# Patient Record
Sex: MALE | Race: ASIAN | Employment: OTHER | URBAN - METROPOLITAN AREA
[De-identification: names, ages, dates, MRNs, and addresses within clinical notes are randomized per-mention and may not be internally consistent; named-entity substitution may affect disease eponyms.]

---

## 2020-11-27 ENCOUNTER — APPOINTMENT (OUTPATIENT)
Dept: CT IMAGING | Facility: CLINIC | Age: 80
End: 2020-11-27
Attending: EMERGENCY MEDICINE
Payer: COMMERCIAL

## 2020-11-27 ENCOUNTER — APPOINTMENT (OUTPATIENT)
Dept: GENERAL RADIOLOGY | Facility: CLINIC | Age: 80
End: 2020-11-27
Attending: EMERGENCY MEDICINE
Payer: COMMERCIAL

## 2020-11-27 ENCOUNTER — HOSPITAL ENCOUNTER (EMERGENCY)
Facility: CLINIC | Age: 80
Discharge: HOME | End: 2020-11-27
Attending: EMERGENCY MEDICINE | Admitting: EMERGENCY MEDICINE
Payer: COMMERCIAL

## 2020-11-27 VITALS
BODY MASS INDEX: 30.51 KG/M2 | OXYGEN SATURATION: 94 % | TEMPERATURE: 99 F | SYSTOLIC BLOOD PRESSURE: 101 MMHG | WEIGHT: 189.82 LBS | RESPIRATION RATE: 19 BRPM | DIASTOLIC BLOOD PRESSURE: 59 MMHG | HEART RATE: 66 BPM | HEIGHT: 66 IN

## 2020-11-27 DIAGNOSIS — J96.01 ACUTE RESPIRATORY FAILURE WITH HYPOXIA (H): ICD-10-CM

## 2020-11-27 DIAGNOSIS — Z20.822 SUSPECTED COVID-19 VIRUS INFECTION: ICD-10-CM

## 2020-11-27 LAB
ALBUMIN SERPL-MCNC: 2 G/DL (ref 3.4–5)
ALP SERPL-CCNC: 196 U/L (ref 40–150)
ALT SERPL W P-5'-P-CCNC: 177 U/L (ref 0–70)
ANION GAP SERPL CALCULATED.3IONS-SCNC: 17 MMOL/L (ref 3–14)
AST SERPL W P-5'-P-CCNC: 156 U/L (ref 0–45)
BASE DEFICIT BLDV-SCNC: 3 MMOL/L
BASE EXCESS BLDV CALC-SCNC: 0.6 MMOL/L
BASOPHILS # BLD AUTO: 0 10E9/L (ref 0–0.2)
BASOPHILS NFR BLD AUTO: 0 %
BILIRUB SERPL-MCNC: 0.8 MG/DL (ref 0.2–1.3)
BUN SERPL-MCNC: 37 MG/DL (ref 7–30)
CALCIUM SERPL-MCNC: 8.3 MG/DL (ref 8.5–10.1)
CHLORIDE SERPL-SCNC: 96 MMOL/L (ref 94–109)
CO2 SERPL-SCNC: 21 MMOL/L (ref 20–32)
CREAT SERPL-MCNC: 1.34 MG/DL (ref 0.66–1.25)
DIFFERENTIAL METHOD BLD: ABNORMAL
EOSINOPHIL # BLD AUTO: 0 10E9/L (ref 0–0.7)
EOSINOPHIL NFR BLD AUTO: 0 %
ERYTHROCYTE [DISTWIDTH] IN BLOOD BY AUTOMATED COUNT: 16.5 % (ref 10–15)
GFR SERPL CREATININE-BSD FRML MDRD: 50 ML/MIN/{1.73_M2}
GLUCOSE SERPL-MCNC: 255 MG/DL (ref 70–99)
HCO3 BLDV-SCNC: 22 MMOL/L (ref 21–28)
HCO3 BLDV-SCNC: 26 MMOL/L (ref 21–28)
HCT VFR BLD AUTO: 31.5 % (ref 40–53)
HGB BLD-MCNC: 10.1 G/DL (ref 13.3–17.7)
INTERPRETATION ECG - MUSE: NORMAL
LABORATORY COMMENT REPORT: ABNORMAL
LACTATE BLD-SCNC: 3 MMOL/L (ref 0.7–2)
LACTATE BLD-SCNC: 9.7 MMOL/L (ref 0.7–2)
LYMPHOCYTES # BLD AUTO: 13.4 10E9/L (ref 0.8–5.3)
LYMPHOCYTES NFR BLD AUTO: 48 %
MCH RBC QN AUTO: 23.8 PG (ref 26.5–33)
MCHC RBC AUTO-ENTMCNC: 32.1 G/DL (ref 31.5–36.5)
MCV RBC AUTO: 74 FL (ref 78–100)
MONOCYTES # BLD AUTO: 0.3 10E9/L (ref 0–1.3)
MONOCYTES NFR BLD AUTO: 1 %
NEUTROPHILS # BLD AUTO: 14.2 10E9/L (ref 1.6–8.3)
NEUTROPHILS NFR BLD AUTO: 51 %
O2/TOTAL GAS SETTING VFR VENT: ABNORMAL %
OXYHGB MFR BLDV: 79 %
OXYHGB MFR BLDV: 9 %
PCO2 BLDV: 37 MM HG (ref 40–50)
PCO2 BLDV: 46 MM HG (ref 40–50)
PH BLDV: 7.37 PH (ref 7.32–7.43)
PH BLDV: 7.38 PH (ref 7.32–7.43)
PLATELET # BLD AUTO: 630 10E9/L (ref 150–450)
PLATELET # BLD EST: ABNORMAL 10*3/UL
PO2 BLDV: 12 MM HG (ref 25–47)
PO2 BLDV: 48 MM HG (ref 25–47)
POIKILOCYTOSIS BLD QL SMEAR: SLIGHT
POTASSIUM SERPL-SCNC: 3.3 MMOL/L (ref 3.4–5.3)
PROT SERPL-MCNC: 6.7 G/DL (ref 6.8–8.8)
RBC # BLD AUTO: 4.24 10E12/L (ref 4.4–5.9)
SARS-COV-2 RNA SPEC QL NAA+PROBE: NORMAL
SARS-COV-2 RNA SPEC QL NAA+PROBE: POSITIVE
SODIUM SERPL-SCNC: 134 MMOL/L (ref 133–144)
SPECIMEN SOURCE: ABNORMAL
SPECIMEN SOURCE: NORMAL
TROPONIN I SERPL-MCNC: 0.28 UG/L (ref 0–0.04)
WBC # BLD AUTO: 27.9 10E9/L (ref 4–11)

## 2020-11-27 PROCEDURE — 250N000011 HC RX IP 250 OP 636: Performed by: EMERGENCY MEDICINE

## 2020-11-27 PROCEDURE — 36556 INSERT NON-TUNNEL CV CATH: CPT

## 2020-11-27 PROCEDURE — 83605 ASSAY OF LACTIC ACID: CPT | Performed by: EMERGENCY MEDICINE

## 2020-11-27 PROCEDURE — 258N000003 HC RX IP 258 OP 636: Performed by: EMERGENCY MEDICINE

## 2020-11-27 PROCEDURE — 80053 COMPREHEN METABOLIC PANEL: CPT | Performed by: EMERGENCY MEDICINE

## 2020-11-27 PROCEDURE — 94644 CONT INHLJ TX 1ST HOUR: CPT

## 2020-11-27 PROCEDURE — C9803 HOPD COVID-19 SPEC COLLECT: HCPCS

## 2020-11-27 PROCEDURE — 71275 CT ANGIOGRAPHY CHEST: CPT

## 2020-11-27 PROCEDURE — 31500 INSERT EMERGENCY AIRWAY: CPT

## 2020-11-27 PROCEDURE — 82805 BLOOD GASES W/O2 SATURATION: CPT | Performed by: EMERGENCY MEDICINE

## 2020-11-27 PROCEDURE — 999N000065 XR CHEST PORT 1 VW

## 2020-11-27 PROCEDURE — 96375 TX/PRO/DX INJ NEW DRUG ADDON: CPT

## 2020-11-27 PROCEDURE — 85025 COMPLETE CBC W/AUTO DIFF WBC: CPT | Performed by: EMERGENCY MEDICINE

## 2020-11-27 PROCEDURE — 250N000011 HC RX IP 250 OP 636

## 2020-11-27 PROCEDURE — 272N000007 HC KIT ART LINE INSERTION

## 2020-11-27 PROCEDURE — 93005 ELECTROCARDIOGRAM TRACING: CPT

## 2020-11-27 PROCEDURE — 96366 THER/PROPH/DIAG IV INF ADDON: CPT

## 2020-11-27 PROCEDURE — 99292 CRITICAL CARE ADDL 30 MIN: CPT

## 2020-11-27 PROCEDURE — 999N000185 HC STATISTIC TRANSPORT TIME EA 15 MIN

## 2020-11-27 PROCEDURE — U0003 INFECTIOUS AGENT DETECTION BY NUCLEIC ACID (DNA OR RNA); SEVERE ACUTE RESPIRATORY SYNDROME CORONAVIRUS 2 (SARS-COV-2) (CORONAVIRUS DISEASE [COVID-19]), AMPLIFIED PROBE TECHNIQUE, MAKING USE OF HIGH THROUGHPUT TECHNOLOGIES AS DESCRIBED BY CMS-2020-01-R: HCPCS | Performed by: EMERGENCY MEDICINE

## 2020-11-27 PROCEDURE — 84484 ASSAY OF TROPONIN QUANT: CPT | Performed by: EMERGENCY MEDICINE

## 2020-11-27 PROCEDURE — 87040 BLOOD CULTURE FOR BACTERIA: CPT | Performed by: EMERGENCY MEDICINE

## 2020-11-27 PROCEDURE — 94002 VENT MGMT INPAT INIT DAY: CPT

## 2020-11-27 PROCEDURE — 96365 THER/PROPH/DIAG IV INF INIT: CPT | Mod: 59

## 2020-11-27 PROCEDURE — 999N000157 HC STATISTIC RCP TIME EA 10 MIN

## 2020-11-27 PROCEDURE — 99291 CRITICAL CARE FIRST HOUR: CPT

## 2020-11-27 PROCEDURE — 250N000009 HC RX 250: Performed by: EMERGENCY MEDICINE

## 2020-11-27 PROCEDURE — 96368 THER/DIAG CONCURRENT INF: CPT

## 2020-11-27 PROCEDURE — 36620 INSERTION CATHETER ARTERY: CPT

## 2020-11-27 RX ORDER — FENTANYL CITRATE 50 UG/ML
50 INJECTION, SOLUTION INTRAMUSCULAR; INTRAVENOUS
Status: DISCONTINUED | OUTPATIENT
Start: 2020-11-27 | End: 2020-11-27 | Stop reason: HOSPADM

## 2020-11-27 RX ORDER — SODIUM CHLORIDE 9 MG/ML
INJECTION, SOLUTION INTRAVENOUS CONTINUOUS
Status: DISCONTINUED | OUTPATIENT
Start: 2020-11-27 | End: 2020-11-27 | Stop reason: HOSPADM

## 2020-11-27 RX ORDER — FENTANYL CITRATE 50 UG/ML
50 INJECTION, SOLUTION INTRAMUSCULAR; INTRAVENOUS ONCE
Status: COMPLETED | OUTPATIENT
Start: 2020-11-27 | End: 2020-11-27

## 2020-11-27 RX ORDER — ATORVASTATIN CALCIUM 40 MG/1
40 TABLET, FILM COATED ORAL AT BEDTIME
COMMUNITY

## 2020-11-27 RX ORDER — CLONIDINE HYDROCHLORIDE 0.1 MG/1
0.1 TABLET ORAL 2 TIMES DAILY
COMMUNITY

## 2020-11-27 RX ORDER — PROPOFOL 10 MG/ML
INJECTION, EMULSION INTRAVENOUS
Status: DISCONTINUED
Start: 2020-11-27 | End: 2020-11-27 | Stop reason: HOSPADM

## 2020-11-27 RX ORDER — NOREPINEPHRINE BITARTRATE 0.06 MG/ML
0.03-0.4 INJECTION, SOLUTION INTRAVENOUS CONTINUOUS
Status: DISCONTINUED | OUTPATIENT
Start: 2020-11-27 | End: 2020-11-27 | Stop reason: HOSPADM

## 2020-11-27 RX ORDER — GLIPIZIDE 2.5 MG/1
5 TABLET, EXTENDED RELEASE ORAL
COMMUNITY
End: 2022-01-17

## 2020-11-27 RX ORDER — DEXAMETHASONE SODIUM PHOSPHATE 4 MG/ML
6 INJECTION, SOLUTION INTRA-ARTICULAR; INTRALESIONAL; INTRAMUSCULAR; INTRAVENOUS; SOFT TISSUE ONCE
Status: COMPLETED | OUTPATIENT
Start: 2020-11-27 | End: 2020-11-27

## 2020-11-27 RX ORDER — ATENOLOL AND CHLORTHALIDONE TABLET 100; 25 MG/1; MG/1
1 TABLET ORAL DAILY
COMMUNITY
End: 2021-10-20

## 2020-11-27 RX ORDER — PIPERACILLIN SODIUM, TAZOBACTAM SODIUM 4; .5 G/20ML; G/20ML
4.5 INJECTION, POWDER, LYOPHILIZED, FOR SOLUTION INTRAVENOUS ONCE
Status: COMPLETED | OUTPATIENT
Start: 2020-11-27 | End: 2020-11-27

## 2020-11-27 RX ORDER — LIDOCAINE 40 MG/G
CREAM TOPICAL
Status: DISCONTINUED | OUTPATIENT
Start: 2020-11-27 | End: 2020-11-27 | Stop reason: HOSPADM

## 2020-11-27 RX ORDER — MIDAZOLAM (PF) 1 MG/ML IN 0.9 % SODIUM CHLORIDE INTRAVENOUS SOLUTION
1-8 CONTINUOUS
Status: DISCONTINUED | OUTPATIENT
Start: 2020-11-27 | End: 2020-11-27 | Stop reason: HOSPADM

## 2020-11-27 RX ORDER — SODIUM CHLORIDE 9 MG/ML
INJECTION, SOLUTION INTRAVENOUS ONCE
Status: COMPLETED | OUTPATIENT
Start: 2020-11-27 | End: 2020-11-27

## 2020-11-27 RX ORDER — PROPOFOL 10 MG/ML
INJECTION, EMULSION INTRAVENOUS
Status: COMPLETED
Start: 2020-11-27 | End: 2020-11-27

## 2020-11-27 RX ORDER — ETOMIDATE 2 MG/ML
30 INJECTION INTRAVENOUS ONCE
Status: COMPLETED | OUTPATIENT
Start: 2020-11-27 | End: 2020-11-27

## 2020-11-27 RX ORDER — IRBESARTAN 300 MG/1
300 TABLET ORAL DAILY
COMMUNITY
End: 2022-01-17

## 2020-11-27 RX ORDER — IOPAMIDOL 755 MG/ML
70 INJECTION, SOLUTION INTRAVASCULAR ONCE
Status: COMPLETED | OUTPATIENT
Start: 2020-11-27 | End: 2020-11-27

## 2020-11-27 RX ORDER — AMLODIPINE BESYLATE 10 MG/1
10 TABLET ORAL DAILY
COMMUNITY
End: 2021-10-20

## 2020-11-27 RX ORDER — PROPOFOL 10 MG/ML
5-75 INJECTION, EMULSION INTRAVENOUS CONTINUOUS
Status: DISCONTINUED | OUTPATIENT
Start: 2020-11-27 | End: 2020-11-27 | Stop reason: HOSPADM

## 2020-11-27 RX ORDER — METFORMIN HCL 500 MG
1000 TABLET, EXTENDED RELEASE 24 HR ORAL 2 TIMES DAILY WITH MEALS
COMMUNITY

## 2020-11-27 RX ADMIN — MIDAZOLAM HYDROCHLORIDE 1 MG: 1 INJECTION, SOLUTION INTRAMUSCULAR; INTRAVENOUS at 09:40

## 2020-11-27 RX ADMIN — PROPOFOL 50 MCG/KG/MIN: 10 INJECTION, EMULSION INTRAVENOUS at 16:10

## 2020-11-27 RX ADMIN — SODIUM CHLORIDE: 9 INJECTION, SOLUTION INTRAVENOUS at 11:37

## 2020-11-27 RX ADMIN — EPOPROSTENOL 20 NG/KG/MIN: 1.5 INJECTION, POWDER, LYOPHILIZED, FOR SOLUTION INTRAVENOUS at 15:28

## 2020-11-27 RX ADMIN — ETOMIDATE INJECTION 30 MG: 2 SOLUTION INTRAVENOUS at 09:10

## 2020-11-27 RX ADMIN — SODIUM CHLORIDE: 9 INJECTION, SOLUTION INTRAVENOUS at 15:24

## 2020-11-27 RX ADMIN — FENTANYL CITRATE 50 MCG: 50 INJECTION, SOLUTION INTRAMUSCULAR; INTRAVENOUS at 18:49

## 2020-11-27 RX ADMIN — MIDAZOLAM (PF) 1 MG/ML IN 0.9 % SODIUM CHLORIDE INTRAVENOUS SOLUTION 1 MG/HR: at 09:58

## 2020-11-27 RX ADMIN — SODIUM CHLORIDE 94 ML: 9 INJECTION, SOLUTION INTRAVENOUS at 10:04

## 2020-11-27 RX ADMIN — SODIUM CHLORIDE 1000 ML: 9 INJECTION, SOLUTION INTRAVENOUS at 09:11

## 2020-11-27 RX ADMIN — PIPERACILLIN SODIUM AND TAZOBACTAM SODIUM 4.5 G: 4; .5 INJECTION, POWDER, LYOPHILIZED, FOR SOLUTION INTRAVENOUS at 16:50

## 2020-11-27 RX ADMIN — Medication 0.03 MCG/KG/MIN: at 12:17

## 2020-11-27 RX ADMIN — PIPERACILLIN SODIUM AND TAZOBACTAM SODIUM 4.5 G: 4; .5 INJECTION, POWDER, LYOPHILIZED, FOR SOLUTION INTRAVENOUS at 09:47

## 2020-11-27 RX ADMIN — FENTANYL CITRATE 50 MCG: 50 INJECTION, SOLUTION INTRAMUSCULAR; INTRAVENOUS at 16:45

## 2020-11-27 RX ADMIN — MIDAZOLAM (PF) 1 MG/ML IN 0.9 % SODIUM CHLORIDE INTRAVENOUS SOLUTION 3 MG/HR: at 10:45

## 2020-11-27 RX ADMIN — SODIUM CHLORIDE, POTASSIUM CHLORIDE, SODIUM LACTATE AND CALCIUM CHLORIDE 1000 ML: 600; 310; 30; 20 INJECTION, SOLUTION INTRAVENOUS at 11:14

## 2020-11-27 RX ADMIN — MIDAZOLAM HYDROCHLORIDE 1 MG: 1 INJECTION, SOLUTION INTRAMUSCULAR; INTRAVENOUS at 09:50

## 2020-11-27 RX ADMIN — SODIUM CHLORIDE 1000 ML: 9 INJECTION, SOLUTION INTRAVENOUS at 10:38

## 2020-11-27 RX ADMIN — FENTANYL CITRATE 50 MCG: 50 INJECTION, SOLUTION INTRAMUSCULAR; INTRAVENOUS at 11:58

## 2020-11-27 RX ADMIN — IOPAMIDOL 70 ML: 755 INJECTION, SOLUTION INTRAVENOUS at 10:03

## 2020-11-27 RX ADMIN — PROPOFOL 10 MCG/KG/MIN: 10 INJECTION, EMULSION INTRAVENOUS at 09:20

## 2020-11-27 RX ADMIN — FENTANYL CITRATE 50 MCG: 50 INJECTION, SOLUTION INTRAMUSCULAR; INTRAVENOUS at 13:34

## 2020-11-27 RX ADMIN — DEXAMETHASONE SODIUM PHOSPHATE 6 MG: 4 INJECTION, SOLUTION INTRAMUSCULAR; INTRAVENOUS at 12:05

## 2020-11-27 RX ADMIN — SUCCINYLCHOLINE CHLORIDE 100 MG: 20 INJECTION, SOLUTION INTRAMUSCULAR; INTRAVENOUS; PARENTERAL at 09:09

## 2020-11-27 RX ADMIN — FENTANYL CITRATE 50 MCG: 50 INJECTION, SOLUTION INTRAMUSCULAR; INTRAVENOUS at 17:50

## 2020-11-27 ASSESSMENT — ENCOUNTER SYMPTOMS: SHORTNESS OF BREATH: 1

## 2020-11-27 NOTE — ED NOTES
Provider aware of continued hypotension, vent settings, vital signs, and peripheral access. New orders obtained.

## 2020-11-27 NOTE — ED NOTES
Called report to Alba RICHARD. [facility], [address]. Room 768, [phone]. Transport called

## 2020-11-27 NOTE — ED PROVIDER NOTES
History     Chief Complaint:  Shortness of Breath (acute onset of shortness of breath this am.  Panting and shivering through the night. )      HPI limited secondary due to language barrier and patient's severe respiratory distress. HPI provided via EMS personal.   HPI   Son Shy is a 80 year old male with a history of type II diabetes and hypertension who presents via ambulance for the evaluation of shortness of breath. EMS reports that the patient developed intense shortness of breath this morning at 0800, prompting for 911 to be called. EMS notes that the patient started to initially feel short of breath last night and that his wife could hear him grunting throughout the night because of this. Of note, the patient's wife did test positive for COVID-19 one week ago. EMS also notes that the patient was started on 4L oxygen via nasal canula and then was switched to 10 L due to continued shortness of breat.     Per phone call discussion with daughter, the patient first began to experience a fever and fatigue one week ago. She states that the patient became suddenly very short of breath just prior to their departure for the ED.     Allergies:  Prinivil  Zaira-seltzer      Medications:    Norvasc  Lipitor  Catapres  Avapro  Glipizide  Levaquin  Metformin  Glipizide     Past Medical History:    Adenomatous polyp of colon  Hearing loss  Pseudophakia, right and left eye  Type II diabetes  ED  Thalassemia  Obesity  Hypertension  Cough varian asthma  Presbyopia   GERD  Hyperlipidemia    Past Surgical History:    History reviewed. No pertinent surgical history.     Family History:    History reviewed. No pertinent family history.      Social History:  Smoking status: Former smoker, quit date: 5/3/1979   Alcohol use: No   Drug use: No  PCP: No primary care provider on file.   Marital Status:   [2]     Review of Systems   Unable to perform ROS: Severe respiratory distress   Respiratory: Positive for shortness of  "breath.    All other systems reviewed and are negative.    Physical Exam     Patient Vitals for the past 24 hrs:   BP Temp Temp src Pulse Resp SpO2 Height Weight   11/27/20 1435 104/65 -- -- 92 (!) 38 (!) 89 % -- --   11/27/20 1430 104/68 -- -- 91 (!) 37 (!) 88 % -- --   11/27/20 1426 -- -- -- 92 (!) 38 (!) 89 % -- --   11/27/20 1425 105/66 -- -- 93 (!) 38 (!) 89 % -- --   11/27/20 1420 114/69 -- -- 96 (!) 39 (!) 88 % -- --   11/27/20 1415 (!) 140/133 -- -- 97 (!) 41 (!) 79 % -- --   11/27/20 1411 -- -- -- 95 28 (!) 77 % -- --   11/27/20 1410 127/78 -- -- 98 -- -- -- --   11/27/20 1406 -- -- -- 89 (!) 39 (!) 78 % -- --   11/27/20 1405 94/67 -- -- 89 (!) 37 -- -- --   11/27/20 1400 95/70 -- -- 89 (!) 36 (!) 77 % -- --   11/27/20 1350 93/62 -- -- 90 (!) 35 (!) 77 % -- --   11/27/20 1345 94/64 -- -- 90 (!) 34 (!) 77 % -- --   11/27/20 1336 -- -- -- 94 (!) 41 (!) 79 % 1.676 m (5' 5.98\") --   11/27/20 1330 112/71 -- -- 98 (!) 44 (!) 79 % -- --   11/27/20 1325 100/67 -- -- 92 (!) 42 (!) 79 % -- --   11/27/20 1320 111/70 -- -- 98 (!) 41 (!) 80 % -- --   11/27/20 1315 109/80 -- -- 101 (!) 43 (!) 71 % -- --   11/27/20 1310 99/65 -- -- 92 (!) 40 90 % -- --   11/27/20 1305 93/56 -- -- 90 (!) 41 93 % -- --   11/27/20 1300 90/62 -- -- 92 (!) 40 93 % -- --   11/27/20 1255 90/57 -- -- 92 (!) 40 93 % -- --   11/27/20 1251 98/63 -- -- 93 (!) 41 91 % -- --   11/27/20 1250 98/63 -- -- 95 (!) 41 92 % -- --   11/27/20 1248 -- -- -- 97 (!) 36 92 % -- --   11/27/20 1245 108/69 -- -- 98 28 -- -- --   11/27/20 1226 113/65 -- -- 98 (!) 39 -- -- --   11/27/20 1205 (!) 84/58 -- -- 92 (!) 31 (!) 85 % -- --   11/27/20 1150 -- -- -- 98 (!) 41 (!) 85 % -- --   11/27/20 1147 (!) 85/58 -- -- 97 -- -- -- --   11/27/20 1145 (!) 68/54 -- -- 93 28 (!) 89 % -- --   11/27/20 1140 94/61 -- -- 96 (!) 39 90 % -- --   11/27/20 1136 -- -- -- 100 (!) 33 (!) 86 % -- --   11/27/20 1130 97/59 -- -- 94 -- -- -- --   11/27/20 1115 (!) 87/55 -- -- 94 (!) 39 93 % -- " --   11/27/20 1110 92/57 -- -- 97 (!) 42 (!) 85 % -- --   11/27/20 1105 98/86 -- -- 93 23 (!) 86 % -- --   11/27/20 1100 (!) 81/51 -- -- 89 (!) 35 95 % -- --   11/27/20 1050 (!) 86/58 -- -- 94 (!) 40 94 % -- --   11/27/20 1040 94/56 -- -- 90 (!) 67 91 % -- --   11/27/20 1035 100/58 -- -- 97 (!) 42 90 % -- --   11/27/20 1030 91/54 -- -- 93 (!) 46 (!) 88 % -- --   11/27/20 1025 92/60 -- -- 93 (!) 47 (!) 87 % -- --   11/27/20 1020 (!) 85/49 -- -- 92 25 92 % -- --   11/27/20 0940 (!) 151/44 -- -- 92 23 92 % -- --   11/27/20 0930 94/59 -- -- 101 27 (!) 78 % -- --   11/27/20 0925 102/59 -- -- 109 (!) 65 (!) 81 % -- --   11/27/20 0920 (!) 148/70 -- -- 125 (!) 45 (!) 77 % -- --   11/27/20 0905 -- -- -- 115 (!) 50 (!) 85 % -- --   11/27/20 0900 (!) 149/82 -- -- 115 (!) 55 (!) 87 % -- --   11/27/20 0855 (!) 149/82 -- -- 141 (!) 43 95 % -- --   11/27/20 0850 (!) 131/106 -- -- 126 (!) 54 (!) 88 % -- --   11/27/20 0849 (!) 140/73 99  F (37.2  C) Temporal -- (!) 52 -- -- 86.1 kg (189 lb 13.1 oz)   11/27/20 0845 (!) 140/73 -- -- 134 (!) 51 (!) 59 % -- --      Physical Exam  SKIN:  Warm, dry.  No rash.  HEMATOLOGIC/IMMUNOLOGIC/LYMPHATIC:  No pallor.  No extremity edema.  No petechia or purpura.  HENT: No stridor.  EYES:  Conjunctivae normal.  CARDIOVASCULAR: Tachycardic rate with regular rhythm.  No murmur.  RESPIRATORY: In respiratory distress, tachypneic, globally diminished breath sounds.  GASTROINTESTINAL:  Soft abdomen with active bowel sounds.  No distention.  No palpable mass.  MUSCULOSKELETAL: Normal body habitus.  NEUROLOGIC:  Alert, independently moving the limbs.  PSYCHIATRIC: Anxious mood.    Emergency Department Course   ECG (08:46:22):  Rate 126 bpm. LA interval 184. QRS duration 104. QT/QTc 308/446. P-R-T axes 73 66 -13. Sinus tachycardia. Marked ST abnormality, possible inferior subendocardial injury. Abnormal ECG. Interpreted at 0920 by Mac Morrow MD.     Imaging:  Radiographic findings were communicated  with the patient who voiced understanding of the findings.  XR Chest Port 1 View   IMPRESSION: Endotracheal tube above the meaghan. NG tube courses below  the diaphragm. There are extensive bilateral pulmonary infiltrates  consistent with Covid 19 pneumonia. No pleural effusion or  pneumothorax.  As read by Radiology.     CT Chest PE w Contrast  IMPRESSION:  1.  No evidence of pulmonary embolus.  2.  Bilateral pulmonary infiltrates consistent with COVID 19  pneumonia.  As read by Radiology.     XR Chest Port 1 View  IMPRESSION:  1. There is a new right IJ line. The tip projects over the SVC/right  atrium junction.  2. The endotracheal tube appears unchanged and projects over the mid  trachea. The enteric tube courses distally beyond the field-of-view.  3. Extensive bilateral lung infiltrates appear unchanged.  As read by Radiology.     Laboratory:  Blood gas venous and oxyhgb: pCO2 37 (L), pO2 12 (L)   CMP: Potassium 3.3 (L), Anion Gap 17 (H), Glucose 255 (H), Bun 37 (H), GFR 50 (L), Creatinine 1.34 (H), Calcium 8.3 (L), Albumin 2.0 (L), Protein Total 6.7 (L), Alkphos 196 (H),  (H),  (H)  Lactic acid whole blood #1 (0902): 9.7 (HH)  Lactic acid whole blood #1 (1219): 3.0 (H)  Troponin (0852): 0.275 (HH)  Blood Culture x2: Pending  CBC: WBC 27.9 (H), HGB 10.1 (L),  (H)    Symptomatic COVID-19 Virus (Coronavirus) by PCR Nasopharyngeal swab: Pending     [facility]    Procedure: Intubation    Date/Time: 11/27/2020 10:02 AM  Performed by: Mac Morrow MD  Authorized by: Mac Morrow MD     UNIVERSAL PROTOCOL   Site Marked: NA  Prior Images Obtained and Reviewed:  NA  Required items: Required blood products, implants, devices and special equipment available    Patient identity confirmed:  Verbally with patient  Patient was reevaluated immediately before administering moderate or deep sedation or anesthesia  Confirmation Checklist:  Patient's identity using two  "indicators  Time out: Immediately prior to the procedure a time out was called    Universal Protocol: the Joint Commission Universal Protocol was followed    Preparation: Patient was prepped and draped in usual sterile fashion          SEDATION    Patient Sedated: Yes    Sedation:  Etomidate  Vital signs: Vital signs monitored during sedation    PROCEDURE   Length of time physician/provider present for 1:1 monitoring during sedation: 15  :     Intubation      INDICATION: Acute respiratory failure.    PERFORMED BY: Mac Morrow MD    CONSENT: The procedure was performed in an emergent situation.    TIMEOUT: Immediately prior to procedure a \"time out\" was called to verify the correct patient, procedure, equipment, support staff and site/side marked as required.    INTUBATION METHOD: Glidescope    PATIENT STATUS: RSI    PREOXYGENATION: Mask    PRETREATMENT MEDICATIONS: None    SEDATIVES: Etomidate    PARALYTIC: succinylcholine    LARYNGOSCOPE SIZE: Mac 3    TUBE SIZE: 7.5 cuffed with cuff inflated after placement  Number of attempts: 1  Cricoid pressure: yes  Cords visualized: yes    POST-PROCEDURE ASSESSMENT: Breath sounds equal bilaterally with chest rise and absent over the epigastrium, Chest x-ray interpreted by me demonstrating endotracheal tube in appropriate position and CO2 detector.    ETT TO TEETH: 24 cm  Tube secured with: ETT merino    Patient tolerated the procedure well with no immediate complications.    COMPLICATIONS:  None      Central Line Placement     Procedure:  Central Line Placement with Ultrasound Guidance.    Indications: Vascular access    Consent:  Risks (including but not limited to: pneumothorax,bleeding, infection or artery puncture), benefits and alternatives were discussed with  unable to obtain due to emergency conditions and consent for procedure was obtained.    Timeout:  Universal protocol was followed. TIME OUT conducted just prior to starting procedure confirmed patient " identity, site/side, procedure, patient position, and availability of correct equipment and implants.?  Yes    Procedure note:  Right Internal Jugular approach was selected and the right anterior neck was prepped, cleansed and draped in a sterile fashion.  Mask, gown and gloves were used per sterile protocol.  Patient was then placed into Trendelenburg position and lidocaine was used for local anesthesia.  Vascular probe with ultrasound was used in a sterile fashion for guidance.  Introducer needle was then used to gain access to the central venous circulation.  Using Seldinger technique the  Triple lumen catheter was placed.  Catheter port(s) were aspirated and flushed.  Central line was sutured in place and sterile dressing applied. Appropriate placement was confirmed by x-ray and no pneumothorax was visualized.    Patient Status:  Patient tolerated the procedure well.  There were no complications.        Arterial Line Insertion      INDICATION: Continuous blood pressure monitoring    ANESTHESIA:  1% lidocaine    CONSENT:   Risks (including but not limited to: bleeding, infection or pain), benefits, and alternatives were discussed with unable to obtain due to emergency conditions and consent for procedure was obtained.      [place] protocol was followed. TIME OUT conducted just prior to starting the procedure confirmed patient identity, site/side, procedure, patient position, abd availability of correct equipment and implants. Yes    The skin overlying the left femoral artery and left radial artery were prepped and draped in a sterile fashion. Left radial and left femoral arterial lines attempted, but unsuccessful.     Interventions:  Medications   lidocaine 1 % 0.1-1 mL (has no administration in time range)   lidocaine (LMX4) cream (has no administration in time range)   sodium chloride (PF) 0.9% PF flush 3 mL (has no administration in time range)   sodium chloride (PF) 0.9% PF flush 3 mL (has no administration  in time range)   midazolam (VERSED) drip - ADULT 100 mg/100 mL in NS PRE-MIX (2 mg/hr Intravenous Rate/Dose Change 11/27/20 1430)   propofol (DIPRIVAN) infusion (40 mcg/kg/min × 86.1 kg Intravenous Rate/Dose Change 11/27/20 1430)   midazolam (VERSED) injection 1 mg (1 mg Intravenous Given 11/27/20 0950)   norepinephrine (LEVOPHED) 16 mg in  mL infusion CENTRAL LINE (0.15 mcg/kg/min × 86.1 kg Intravenous Rate/Dose Change 11/27/20 1412)   fentaNYL (PF) (SUBLIMAZE) injection 50 mcg (50 mcg Intravenous Given 11/27/20 1334)   epoprostenol (VELETRI) 20 mcg/mL in sterile water inhalation solution (has no administration in time range)   etomidate (AMIDATE) injection 30 mg (30 mg Intravenous Given 11/27/20 0910)   succinylcholine (ANECTINE) injection 100 mg (100 mg Intravenous Given 11/27/20 0909)   0.9% sodium chloride BOLUS (0 mLs Intravenous Stopped 11/27/20 1037)   piperacillin-tazobactam (ZOSYN) 4.5 g vial to attach to  mL bag (0 g Intravenous Stopped 11/27/20 1037)   iopamidol (ISOVUE-370) solution 70 mL (70 mLs Intravenous Given 11/27/20 1003)   Saline flush (94 mLs Intravenous Given 11/27/20 1004)   0.9% sodium chloride BOLUS (0 mLs Intravenous Stopped 11/27/20 1202)   lactated ringers BOLUS 1,000 mL (0 mLs Intravenous Stopped 11/27/20 1202)   sodium chloride 0.9% infusion ( Intravenous Rate/Dose Verify 11/27/20 1336)   fentaNYL (PF) (SUBLIMAZE) injection 50 mcg (50 mcg Intravenous Given 11/27/20 1158)   dexamethasone (DECADRON) injection 6 mg (6 mg Intravenous Given 11/27/20 1205)     0906, Amidate, 30 mg, IV  0906, Anectine, 100 mg, IV  0911, NS 1L IV Bolus   0930, propofol, 40 mcg/ kg/min, IV - stopped  0950, Versed, 1 mg, IV  1030, NS 1.5 L IV Bolus   1037, Zosyn, 4.5 g IV  1045, Versed, 2 mcg/hr, IV  1113, Versed, 5 mg/hr, IV  1158, Sublimaze, 50 mcg, IV  1205, Decadron, 6 mg, IV  1217, norepinephrine, 0.03 mcg/kg/min, IV  1243, Versed, 6 mg/hr, IV  1244, norepinephrine, 0.06 mcg/kg/min, IV  1344,  Versed, 50 mcg, IV  Pending, Veletri     Emergency Department Course:  Patient arrived via ambulance.     Past medical records, nursing notes, and vitals reviewed.  0842: I performed an exam of the patient and obtained history, as documented above.     IV inserted and blood drawn.     0855: Patient placed on BiPAP.     0906: 30 mg of IV etomidate given.     0906: 100 mg of IV succinylcholine given.     0907: Patient intubated with Glidescope.     The patient had a portable chest x-ray performed, findings above.     0935: I was called back to the patient's room. I rechecked the patient. Patient was not staying sedative enough with propofol, med switched.     0951: I spoke with the patient's daughter and granddaughter over the phone.     1150: I spoke with Dr. Jarvis, intensivist.     1153: I rechecked the patient. Central line placement procedure performed.      The patient had a portable chest x-ray performed, findings above.     Findings and plan explained to the daughter. Patient will be transferred to [facility] via EMS. Discussed the case with Dr. Tyler, who will admit the patient to a monitored bed for further monitoring, evaluation, and treatment.     Impression & Plan    CMS Diagnoses:   The patient has signs of Septic Shock  The patient has signs of septic shock as evidenced by:  1. Presence of Sepsis, AND  2. Lactic Acidosis with value greater than or equal to 4    Time septic shock diagnosis confirmed = 0902  11/27/20   as this was the time when Lactate was resulted and the level was greater than or equal to 4    3 Hour Septic Shock Bundle Completion:  1. Initial Lactic Acid Result:   Recent Labs   Lab Test 11/27/20  1206 11/27/20  0852   LACT 3.0* 9.7*     2. Blood Cultures before Antibiotics: Yes  3. Broad Spectrum Antibiotics Administered:  yes       Anti-infectives (From admission through now)    Start     Dose/Rate Route Frequency Ordered Stop    11/27/20 0915  piperacillin-tazobactam  (ZOSYN) 4.5 g vial to attach to  mL bag      4.5 g  over 30 Minutes Intravenous ONCE 11/27/20 0914 11/27/20 1037          4. IF patient is in septic shock within 6 hours of time zero, as defined by:  -Initial Hypotension:  2 low BP readings (SBP <90, MAP <65, or decrease > 40 from baseline due to infection) within 3 hrs of each other during the time period of 6hrs before and 6 hrs  after time zero  -Lactate > or = 4  THEN: Full bolus NOT administered due to 2,580 ml of IV fluids given    BMI Readings from Last 1 Encounters:   11/27/20 30.65 kg/m      30 mL/kg fluids based on weight: 2,580 mL  30 mL/kg fluids based on IBW (must be >= 60 inches tall): 1,910 mL    Septic Shock reassessment:  1. Repeat Lactic Acid Level: 3.0   2. MAP>65 after initial IVF bolus, will continue to monitor fluid status and vital signs    I attest to having performed a repeat sepsis exam and assessment of perfusion at 1153 and the results demonstrate improved perfusion.    Medical Decision Making:  Patient presented in respiratory failure and the most likely root to the ideology is COVID-19. I did consider PE given his acute worsening en route as described by EMS, but gladly no evidence of that per CT scan. The patient arrived and maintained to be critically ill during his time in the ED. Ultimately admission was arranged at [facility] after discussion with their intensivist. Considered also given the patient's very elevated lactic acid on arrival, he may be suffering septic shock of bacterial ideology. He was aggressively rehydrated per septic shock protocol and was given a broad spectrum antibiotic. Once sedation was initiated after the patient was intubated, his blood pressure was somewhat soft at times less than 90 systolic so his sedatives were adjusted accordingly and transition from propofol to Versed, but still his pressure at times was soft. Ultimately, he was started on Levophed after placement of a right internal jugular  approach central line. Unfortunately, arterial line placement was unsuccessful. We also administered Decadron with respect to COVID-19 and he was administered inhaled Veletri with respect to COVID-19. The patient's prognosis is quiet grim and I relayed that with the family members who I spoke with, however, it sounds the patient after discussion has maintained to be a full code. At change of shift, pending the patient's transfer to [facility], I signed out the patient to my associate pending transfer.     Critical care time:    Critical care time excluding procedures 120 minutes      Diagnosis:    ICD-10-CM    1. Acute respiratory failure with hypoxia (H)  J96.01 CBC with platelets differential     Blood culture     Blood culture     Symptomatic COVID-19 Virus (Coronavirus) by PCR     Lactic acid     SARS-CoV-2 COVID-19 Virus (Coronavirus) RT-PCR     SARS-CoV-2 COVID-19 Virus (Coronavirus) RT-PCR   2. Suspected COVID-19 virus infection  Z20.828            Disposition:  Pending transfer to [facility].     Scribe Disclosure:  I, William Richmond, am serving as a scribe at 8:53 AM on 11/27/2020 to document services personally performed by Mac Morrow MD based on my observations and the provider's statements to me.      William Richmond  11/27/2020   [facility] EMERGENCY DEPT       Mac Morrow MD  11/27/20 [id number]

## 2020-11-27 NOTE — ED TRIAGE NOTES
Patient arrives via EMS RR 50, abdominal muscle use, panting.  By report patient was panting and shivering through the night.  Was on the way to the hospital when he became acutely short of breath and family called 911.  Wife is reportedly COVID positive.

## 2020-12-03 LAB
BACTERIA SPEC CULT: NO GROWTH
BACTERIA SPEC CULT: NO GROWTH
SPECIMEN SOURCE: NORMAL
SPECIMEN SOURCE: NORMAL

## 2021-09-12 ENCOUNTER — OFFICE VISIT (OUTPATIENT)
Dept: URGENT CARE | Facility: URGENT CARE | Age: 81
End: 2021-09-12
Payer: COMMERCIAL

## 2021-09-12 VITALS
SYSTOLIC BLOOD PRESSURE: 140 MMHG | TEMPERATURE: 98.6 F | WEIGHT: 190 LBS | BODY MASS INDEX: 30.68 KG/M2 | RESPIRATION RATE: 20 BRPM | DIASTOLIC BLOOD PRESSURE: 82 MMHG | OXYGEN SATURATION: 99 % | HEART RATE: 62 BPM

## 2021-09-12 DIAGNOSIS — I10 BENIGN ESSENTIAL HYPERTENSION: ICD-10-CM

## 2021-09-12 DIAGNOSIS — J39.2 THROAT IRRITATION: Primary | ICD-10-CM

## 2021-09-12 PROCEDURE — 99204 OFFICE O/P NEW MOD 45 MIN: CPT | Performed by: FAMILY MEDICINE

## 2021-09-12 NOTE — PROGRESS NOTES
SUBJECTIVE: Son Shy is a 80 year old male presenting with a chief complaint of throat irritation from pistachios and fluctuating BP.  Onset of symptoms was day(s) ago.    Past Medical History:   Diagnosis Date     Diabetes (H)      Hypertension      Allergies   Allergen Reactions     Zaira-[place]      Prinivil [Lisinopril]      Social History     Tobacco Use     Smoking status: Former Smoker     Quit date: 5/3/1979     Years since quittin.3     Smokeless tobacco: Never Used   Substance Use Topics     Alcohol use: Not Currently       ROS:  SKIN: no rash  GI: no vomiting    OBJECTIVE:  BP (!) 140/82   Pulse 62   Temp 98.6  F (37  C)   Resp 20   Wt 86.2 kg (190 lb)   SpO2 99%   BMI 30.68 kg/m  GENERAL APPEARANCE: healthy, alert and no distress  EYES: EOMI,  PERRL, conjunctiva clear  HENT: ear canals and TM's normal.  Nose and mouth without ulcers, erythema or lesions  NECK: supple, nontender, no lymphadenopathy  SKIN: no suspicious lesions or rashes      ICD-10-CM    1. Throat irritation  J39.2    2. Benign essential hypertension  I10      Monitor BP and recheck with PCP  Salt water gargles  Fluids/Rest, f/u if worse/not any better

## 2021-10-20 ENCOUNTER — APPOINTMENT (OUTPATIENT)
Dept: GENERAL RADIOLOGY | Facility: CLINIC | Age: 81
DRG: 247 | End: 2021-10-20
Attending: EMERGENCY MEDICINE
Payer: COMMERCIAL

## 2021-10-20 ENCOUNTER — HOSPITAL ENCOUNTER (INPATIENT)
Facility: CLINIC | Age: 81
LOS: 2 days | Discharge: HOME | DRG: 247 | End: 2021-10-22
Attending: EMERGENCY MEDICINE | Admitting: HOSPITALIST
Payer: COMMERCIAL

## 2021-10-20 DIAGNOSIS — I21.4 NSTEMI (NON-ST ELEVATED MYOCARDIAL INFARCTION) (H): Primary | ICD-10-CM

## 2021-10-20 DIAGNOSIS — R07.9 CHEST PAIN, UNSPECIFIED TYPE: ICD-10-CM

## 2021-10-20 LAB
ANION GAP SERPL CALCULATED.3IONS-SCNC: 8 MMOL/L (ref 3–14)
ATRIAL RATE - MUSE: 105 BPM
BASOPHILS # BLD MANUAL: 0 10E3/UL (ref 0–0.2)
BASOPHILS NFR BLD MANUAL: 0 %
BUN SERPL-MCNC: 21 MG/DL (ref 7–30)
CALCIUM SERPL-MCNC: 8.3 MG/DL (ref 8.5–10.1)
CHLORIDE BLD-SCNC: 104 MMOL/L (ref 94–109)
CO2 SERPL-SCNC: 24 MMOL/L (ref 20–32)
CREAT SERPL-MCNC: 0.97 MG/DL (ref 0.66–1.25)
DIASTOLIC BLOOD PRESSURE - MUSE: NORMAL MMHG
ELLIPTOCYTES BLD QL SMEAR: ABNORMAL
EOSINOPHIL # BLD MANUAL: 0 10E3/UL (ref 0–0.7)
EOSINOPHIL NFR BLD MANUAL: 0 %
ERYTHROCYTE [DISTWIDTH] IN BLOOD BY AUTOMATED COUNT: 17.2 % (ref 10–15)
FRAGMENTS BLD QL SMEAR: ABNORMAL
GFR SERPL CREATININE-BSD FRML MDRD: 74 ML/MIN/1.73M2
GLUCOSE BLD-MCNC: 288 MG/DL (ref 70–99)
GLUCOSE BLDC GLUCOMTR-MCNC: 95 MG/DL (ref 70–99)
HBA1C MFR BLD: 7.9 % (ref 0–5.6)
HCT VFR BLD AUTO: 36.2 % (ref 40–53)
HGB BLD-MCNC: 11.1 G/DL (ref 13.3–17.7)
HOLD SPECIMEN: NORMAL
INTERPRETATION ECG - MUSE: NORMAL
LYMPHOCYTES # BLD MANUAL: 7.2 10E3/UL (ref 0.8–5.3)
LYMPHOCYTES NFR BLD MANUAL: 59 %
MCH RBC QN AUTO: 23.3 PG (ref 26.5–33)
MCHC RBC AUTO-ENTMCNC: 30.7 G/DL (ref 31.5–36.5)
MCV RBC AUTO: 76 FL (ref 78–100)
MONOCYTES # BLD MANUAL: 0.4 10E3/UL (ref 0–1.3)
MONOCYTES NFR BLD MANUAL: 3 %
NEUTROPHILS # BLD MANUAL: 4.6 10E3/UL (ref 1.6–8.3)
NEUTROPHILS NFR BLD MANUAL: 38 %
P AXIS - MUSE: 60 DEGREES
PLAT MORPH BLD: ABNORMAL
PLATELET # BLD AUTO: 472 10E3/UL (ref 150–450)
POTASSIUM BLD-SCNC: 3.8 MMOL/L (ref 3.4–5.3)
PR INTERVAL - MUSE: 220 MS
QRS DURATION - MUSE: 106 MS
QT - MUSE: 398 MS
QTC - MUSE: 526 MS
R AXIS - MUSE: 51 DEGREES
RBC # BLD AUTO: 4.76 10E6/UL (ref 4.4–5.9)
RBC MORPH BLD: ABNORMAL
SARS-COV-2 RNA RESP QL NAA+PROBE: NEGATIVE
SMUDGE CELLS BLD QL SMEAR: PRESENT
SODIUM SERPL-SCNC: 136 MMOL/L (ref 133–144)
SYSTOLIC BLOOD PRESSURE - MUSE: NORMAL MMHG
T AXIS - MUSE: 41 DEGREES
TROPONIN I SERPL-MCNC: 0.19 UG/L (ref 0–0.04)
TROPONIN I SERPL-MCNC: 0.35 UG/L (ref 0–0.04)
TSH SERPL DL<=0.005 MIU/L-ACNC: 3.05 MU/L (ref 0.4–4)
UFH PPP CHRO-ACNC: 0.48 IU/ML
VENTRICULAR RATE- MUSE: 105 BPM
WBC # BLD AUTO: 12.2 10E3/UL (ref 4–11)

## 2021-10-20 PROCEDURE — C9803 HOPD COVID-19 SPEC COLLECT: HCPCS

## 2021-10-20 PROCEDURE — 71046 X-RAY EXAM CHEST 2 VIEWS: CPT

## 2021-10-20 PROCEDURE — 96376 TX/PRO/DX INJ SAME DRUG ADON: CPT

## 2021-10-20 PROCEDURE — 99285 EMERGENCY DEPT VISIT HI MDM: CPT | Mod: 25

## 2021-10-20 PROCEDURE — 84484 ASSAY OF TROPONIN QUANT: CPT | Performed by: HOSPITALIST

## 2021-10-20 PROCEDURE — 36415 COLL VENOUS BLD VENIPUNCTURE: CPT | Performed by: EMERGENCY MEDICINE

## 2021-10-20 PROCEDURE — 99223 1ST HOSP IP/OBS HIGH 75: CPT | Mod: AI | Performed by: HOSPITALIST

## 2021-10-20 PROCEDURE — 250N000013 HC RX MED GY IP 250 OP 250 PS 637: Performed by: HOSPITALIST

## 2021-10-20 PROCEDURE — 87635 SARS-COV-2 COVID-19 AMP PRB: CPT | Performed by: EMERGENCY MEDICINE

## 2021-10-20 PROCEDURE — 84484 ASSAY OF TROPONIN QUANT: CPT | Performed by: EMERGENCY MEDICINE

## 2021-10-20 PROCEDURE — 96366 THER/PROPH/DIAG IV INF ADDON: CPT

## 2021-10-20 PROCEDURE — 96365 THER/PROPH/DIAG IV INF INIT: CPT

## 2021-10-20 PROCEDURE — 210N000002 HC R&B HEART CARE

## 2021-10-20 PROCEDURE — 250N000011 HC RX IP 250 OP 636: Performed by: EMERGENCY MEDICINE

## 2021-10-20 PROCEDURE — 80048 BASIC METABOLIC PNL TOTAL CA: CPT | Performed by: EMERGENCY MEDICINE

## 2021-10-20 PROCEDURE — 84443 ASSAY THYROID STIM HORMONE: CPT | Performed by: EMERGENCY MEDICINE

## 2021-10-20 PROCEDURE — 83036 HEMOGLOBIN GLYCOSYLATED A1C: CPT | Performed by: HOSPITALIST

## 2021-10-20 PROCEDURE — 85520 HEPARIN ASSAY: CPT | Performed by: EMERGENCY MEDICINE

## 2021-10-20 PROCEDURE — 85027 COMPLETE CBC AUTOMATED: CPT | Performed by: EMERGENCY MEDICINE

## 2021-10-20 PROCEDURE — 250N000013 HC RX MED GY IP 250 OP 250 PS 637: Performed by: EMERGENCY MEDICINE

## 2021-10-20 PROCEDURE — 93005 ELECTROCARDIOGRAM TRACING: CPT

## 2021-10-20 RX ORDER — AMOXICILLIN 250 MG
2 CAPSULE ORAL 2 TIMES DAILY PRN
Status: DISCONTINUED | OUTPATIENT
Start: 2021-10-20 | End: 2021-10-22 | Stop reason: HOSPADM

## 2021-10-20 RX ORDER — CHLORTHALIDONE 25 MG/1
1 TABLET ORAL DAILY
COMMUNITY
Start: 2021-10-06 | End: 2021-11-03

## 2021-10-20 RX ORDER — ASPIRIN 325 MG
325 TABLET ORAL ONCE
Status: COMPLETED | OUTPATIENT
Start: 2021-10-20 | End: 2021-10-20

## 2021-10-20 RX ORDER — ONDANSETRON 4 MG/1
4 TABLET, ORALLY DISINTEGRATING ORAL EVERY 6 HOURS PRN
Status: DISCONTINUED | OUTPATIENT
Start: 2021-10-20 | End: 2021-10-22 | Stop reason: HOSPADM

## 2021-10-20 RX ORDER — CHLORTHALIDONE 25 MG/1
25 TABLET ORAL EVERY EVENING
Status: DISCONTINUED | OUTPATIENT
Start: 2021-10-20 | End: 2021-10-22 | Stop reason: HOSPADM

## 2021-10-20 RX ORDER — ATORVASTATIN CALCIUM 40 MG/1
40 TABLET, FILM COATED ORAL EVERY EVENING
Status: DISCONTINUED | OUTPATIENT
Start: 2021-10-20 | End: 2021-10-22 | Stop reason: HOSPADM

## 2021-10-20 RX ORDER — VITAMIN B COMPLEX
1 TABLET ORAL 2 TIMES DAILY
COMMUNITY

## 2021-10-20 RX ORDER — CLONIDINE HYDROCHLORIDE 0.1 MG/1
0.1 TABLET ORAL 2 TIMES DAILY
Status: DISCONTINUED | OUTPATIENT
Start: 2021-10-20 | End: 2021-10-22 | Stop reason: HOSPADM

## 2021-10-20 RX ORDER — LIDOCAINE 40 MG/G
CREAM TOPICAL
Status: DISCONTINUED | OUTPATIENT
Start: 2021-10-20 | End: 2021-10-22 | Stop reason: HOSPADM

## 2021-10-20 RX ORDER — NICOTINE POLACRILEX 4 MG
15-30 LOZENGE BUCCAL
Status: DISCONTINUED | OUTPATIENT
Start: 2021-10-20 | End: 2021-10-22 | Stop reason: HOSPADM

## 2021-10-20 RX ORDER — POLYETHYLENE GLYCOL 3350 17 G/17G
17 POWDER, FOR SOLUTION ORAL DAILY PRN
Status: DISCONTINUED | OUTPATIENT
Start: 2021-10-20 | End: 2021-10-22 | Stop reason: HOSPADM

## 2021-10-20 RX ORDER — HEPARIN SODIUM 10000 [USP'U]/100ML
0-5000 INJECTION, SOLUTION INTRAVENOUS CONTINUOUS
Status: DISCONTINUED | OUTPATIENT
Start: 2021-10-20 | End: 2021-10-21

## 2021-10-20 RX ORDER — METOPROLOL SUCCINATE 50 MG/1
50 TABLET, EXTENDED RELEASE ORAL DAILY
COMMUNITY
Start: 2021-10-05 | End: 2021-11-03

## 2021-10-20 RX ORDER — BISACODYL 10 MG
10 SUPPOSITORY, RECTAL RECTAL DAILY PRN
Status: DISCONTINUED | OUTPATIENT
Start: 2021-10-20 | End: 2021-10-22 | Stop reason: HOSPADM

## 2021-10-20 RX ORDER — ACETAMINOPHEN 325 MG/1
650 TABLET ORAL EVERY 6 HOURS PRN
Status: DISCONTINUED | OUTPATIENT
Start: 2021-10-20 | End: 2021-10-22 | Stop reason: HOSPADM

## 2021-10-20 RX ORDER — AMOXICILLIN 250 MG
1 CAPSULE ORAL 2 TIMES DAILY PRN
Status: DISCONTINUED | OUTPATIENT
Start: 2021-10-20 | End: 2021-10-22 | Stop reason: HOSPADM

## 2021-10-20 RX ORDER — ONDANSETRON 2 MG/ML
4 INJECTION INTRAMUSCULAR; INTRAVENOUS EVERY 6 HOURS PRN
Status: DISCONTINUED | OUTPATIENT
Start: 2021-10-20 | End: 2021-10-22 | Stop reason: HOSPADM

## 2021-10-20 RX ORDER — ACETAMINOPHEN 650 MG/1
650 SUPPOSITORY RECTAL EVERY 6 HOURS PRN
Status: DISCONTINUED | OUTPATIENT
Start: 2021-10-20 | End: 2021-10-22 | Stop reason: HOSPADM

## 2021-10-20 RX ORDER — DEXTROSE MONOHYDRATE 25 G/50ML
25-50 INJECTION, SOLUTION INTRAVENOUS
Status: DISCONTINUED | OUTPATIENT
Start: 2021-10-20 | End: 2021-10-22 | Stop reason: HOSPADM

## 2021-10-20 RX ADMIN — CHLORTHALIDONE 25 MG: 25 TABLET ORAL at 21:09

## 2021-10-20 RX ADMIN — ATORVASTATIN CALCIUM 40 MG: 40 TABLET, FILM COATED ORAL at 21:09

## 2021-10-20 RX ADMIN — HEPARIN SODIUM 1050 UNITS/HR: 10000 INJECTION, SOLUTION INTRAVENOUS at 17:59

## 2021-10-20 RX ADMIN — METOPROLOL TARTRATE 12.5 MG: 25 TABLET, FILM COATED ORAL at 21:09

## 2021-10-20 RX ADMIN — CLONIDINE HYDROCHLORIDE 0.1 MG: 0.1 TABLET ORAL at 21:09

## 2021-10-20 RX ADMIN — ASPIRIN 325 MG ORAL TABLET 325 MG: 325 PILL ORAL at 17:34

## 2021-10-20 ASSESSMENT — ENCOUNTER SYMPTOMS
SHORTNESS OF BREATH: 0
ABDOMINAL PAIN: 0
PALPITATIONS: 1
NAUSEA: 0
VOMITING: 0
HEADACHES: 0
FEVER: 0
DIARRHEA: 0

## 2021-10-20 ASSESSMENT — ACTIVITIES OF DAILY LIVING (ADL)
ADLS_ACUITY_SCORE: 9
ADLS_ACUITY_SCORE: 7
ADLS_ACUITY_SCORE: 9
ADLS_ACUITY_SCORE: 7
ADLS_ACUITY_SCORE: 9
ADLS_ACUITY_SCORE: 7

## 2021-10-20 NOTE — Clinical Note
Max pressure = 11 yue. Total duration = 45 seconds.     Max pressure = 12 yue. Total duration = 45 seconds.    Balloon reinflated a second time: Max pressure = 12 yue. Total duration = 45 seconds.

## 2021-10-20 NOTE — Clinical Note
Max pressure = 11 yue. Total duration = 38 seconds.     Max pressure = 14 yue. Total duration = 15 seconds.    Balloon reinflated a second time: Max pressure = 14 yue. Total duration = 15 seconds.

## 2021-10-20 NOTE — ED PROVIDER NOTES
History   Chief Complaint:  Chest Pain and Tachycardia     The history is provided by the patient. The history is limited by a language barrier. A  was used.      Son Shy is a 80 year old male with history of type II diabetes, hypertension, hyperlipidemia, esophageal reflux, and diabetic neuropathy who presents with chest pain and tachycardia. About 2 weeks ago, the patient visited his primary care provider and was placed on new medications for his hypertension. The patient has been taking this as directed and has not missed any doses. Today, the patient noticed that his heart was racing, and about an hour before presenting to the ED, ne experienced any episode of chest pain. The pain was located in the center of his chest. The palpitations have been intermittent for a week, but the chest pain was new today. He denies shortness of breath, abdominal pain, vomiting, and diarrhea. He also denies headache, fever, and vision changes.     Review of Systems   Constitutional: Negative for fever.   Eyes: Negative for visual disturbance.   Respiratory: Negative for shortness of breath.    Cardiovascular: Positive for chest pain and palpitations.   Gastrointestinal: Negative for abdominal pain, diarrhea, nausea and vomiting.   Neurological: Negative for headaches.   All other systems reviewed and are negative.    Allergies:  Zaira-Matteson  Prinivil [Lisinopril]  ACE inhibitors    Medications:  Norvasc  Tenoretic  Lipitor  Catapres  Glucotrol XL  Avapro  Glucophage-XR  Proventil nebulizer solution  Ativan  Accu-chek smartview control solution  Accu-chek fastclix  Accu-chek smartview  Zovirax  Glucotrol XL  Toprol-XL  Hygroton    Past Medical History:     Type II diabetes  Hypertension  Myopia  Presbyopia  Impaired fasting glucose  Impotence of organic origin  Astigmatism  Cough variant asthma  Minimal degenerative changes (L5-S1)  Other thalassemia  Esophageal reflux  Malaria  Benign neoplasm of  colon  IGT  Reflux esophagitis  Colonic polyps  Diabetic neuropathy  Hyperlipidemia  COVID-19    Past Surgical History:    Colonoscopy  Sigmoidoscopy flex; DX-SEP proc  Colonoscopy flex; w/remv les-snare  [place]/single tooth extraction  Extracapsular cataract remv IOL  Cataract removal w/corneo-scleral    Family History:    Mother: hypertension  Sister: hypertension    Social History:  PCP: Klarissa Clifton  Presents with his son  Lives with his son  History of smoking  No current alcohol use    Physical Exam     Patient Vitals for the past 24 hrs:   BP Temp Temp src Pulse Resp SpO2 Weight   10/20/21 1714 -- -- -- -- -- -- 87.1 kg (192 lb)   10/20/21 1700 (!) 149/81 -- -- 102 14 100 % --   10/20/21 1454 (!) 160/75 97.9  F (36.6  C) Temporal 109 16 100 % --       Physical Exam  General: Alert, appears well-developed and well-nourished. Cooperative.     In mild distress  HEENT:  Head:  Atraumatic  Ears:  External ears are normal  Mouth/Throat:  Oropharynx is without erythema or exudate and mucous membranes are moist.   Eyes:   Conjunctivae normal and EOM are normal. No scleral icterus.  CV:  Tachycardic rate, regular rhythm, normal heart sounds and radial pulses are 2+ and symmetric.  No murmur.  Resp:  Breath sounds are clear bilaterally    Non-labored, no retractions or accessory muscle use  GI:  Abdomen is soft, no distension, no tenderness. No rebound or guarding.  No CVA tenderness bilaterally  MS:  Normal range of motion. No edema.    Normal strength in all 4 extremities.     Back atraumatic.    No midline cervical, thoracic, or lumbar tenderness  Skin:  Warm and dry.  No rash or lesions noted.  Neuro:  Alert. Normal strength.  GCS: 15  Psych:  Normal mood and affect.    Emergency Department Course   ECG  ECG obtained at 1500, ECG read at 1656  Sinus tachycardia with 1st degree AV block. Nonspecific ST segment changes to inferior leads.  Anterior infarct, age undetermined. Prolonged QT. Abnormal ECG.   No  significant changes as compared to prior, dated 11/27/2020.  Rate 105 bpm. AL interval 220 ms. QRS duration 106 ms. QT/QTc 398/526 ms. P-R-T axes 60 51 41.     Imaging:  XR Chest 2 Views   Final Result   IMPRESSION:       Cardiac silhouette is normal in size. There are atheromatous calcifications of the aorta but no findings to suggest aortic enlargement. Hilar contours and lung vascularity are normal      Symmetric lung inflation. No interstitial or alveolar lung opacities. Diaphragm curvature is preserved. There is no pleural fluid.      There are small flowing degenerative osteophytes through most of the thoracic spine but no vertebral compression deformity or aggressive/destructive bone lesion. No displaced rib fractures are detected.        Report per radiology    Laboratory:  Labs Ordered and Resulted from Time of ED Arrival Up to the Time of Departure from the ED   BASIC METABOLIC PANEL - Abnormal; Notable for the following components:       Result Value    Calcium 8.3 (*)     Glucose 288 (*)     All other components within normal limits   TROPONIN I - Abnormal; Notable for the following components:    Troponin I 0.189 (*)     All other components within normal limits   CBC WITH PLATELETS AND DIFFERENTIAL - Abnormal; Notable for the following components:    WBC Count 12.2 (*)     Hemoglobin 11.1 (*)     Hematocrit 36.2 (*)     MCV 76 (*)     MCH 23.3 (*)     MCHC 30.7 (*)     RDW 17.2 (*)     Platelet Count 472 (*)     All other components within normal limits   COVID-19 VIRUS (CORONAVIRUS) BY PCR - Normal    Narrative:     Testing was performed using the parisa  SARS-CoV-2 & Influenza A/B Assay on the parisa  María  System.  This test should be ordered for the detection of SARS-COV-2 in individuals who meet SARS-CoV-2 clinical and/or epidemiological criteria. Test performance is unknown in asymptomatic patients.  This test is for in vitro diagnostic use under the FDA EUA for laboratories certified under CLIA to  perform moderate and/or high complexity testing. This test has not been FDA cleared or approved.  A negative test does not rule out the presence of PCR inhibitors in the specimen or target RNA in concentration below the limit of detection for the assay. The possibility of a false negative should be considered if the patient's recent exposure or clinical presentation suggests COVID-19.  [facility] are certified under the Clinical Laboratory Improvement Amendments of 1988 (CLIA-88) as qualified to perform moderate and/or high complexity laboratory testing.   TSH WITH FREE T4 REFLEX - Normal   EXTRA BLUE TOP TUBE   EXTRA RED TOP TUBE   EXTRA GREEN TOP (LITHIUM HEPARIN) TUBE   EXTRA PURPLE TOP TUBE   PERIPHERAL IV CATHETER   MEASURE WEIGHT   NOTIFY PHYSICIAN   NOTIFY PHYSICIAN   EXTRA TUBE    Narrative:     The following orders were created for panel order [place] Draw.  Procedure                               Abnormality         Status                     ---------                               -----------         ------                     Extra Blue Top Tube[[id number]]                              Final result               Extra Red Top Tube[[id number]]                               Final result               Extra Green Top (Lithium...[[id number]]                      Final result               Extra Purple Top Tube[[id number]]                            Final result                 Please view results for these tests on the individual orders.   CBC WITH PLATELETS & DIFFERENTIAL    Narrative:     The following orders were created for panel order CBC with Platelets & Differential.  Procedure                               Abnormality         Status                     ---------                               -----------         ------                     CBC with platelets and d...[[id number]]  Abnormal            Preliminary result           Please view results for these tests on the individual  orders.     Emergency Department Course:  Reviewed:  I reviewed nursing notes, vitals, past medical history and Care Everywhere    Assessments:  1656 I obtained history and examined the patient as noted above.     Consults:  I spoke with Dr. Jimenez about the patient's condition and need for admission.     Interventions:  1734 Aspirin 325 mg oral   1757 Heparin loading dose for low intensity treatment 5,250 units IV  1759 Heparin 25,000 units in 0.45 % NaCl 250 mL anticoagulant infusion 1,050 units/hr IV    Disposition:  The patient was admitted to the hospital under the care of Dr. Jimenez.     Impression & Plan     CMS Diagnoses: None    Medical Decision Making:  Kostas Begum is a 80 year old male who presents with chest pain.  His history and risk factor analysis are significant for HTN, DM, HLD.  The workup in the Emergency Department (see above for cardiac enzymes and EKG)  is  Concerning for NSTEMI with nonspecific EKG changes in the setting of elevated troponin.  My clinical suspicion of acute coronary syndrome is high enough to warrant further therapy and investigation.  I will admit the patient  to medicine service for further workup.  The patient is pain free prior to arrival in ED.  He was given full dose aspirin in ED.  After discussing with him the risks and benefits of heparinization and given lack of contraindication to this therapy, heparin bolus and drip were started given suspicion of acute coronary syndrome.      There is no clinical, laboratory, or radiographic evidence of pulmonary embolism, AAA, aortic dissection, pneumonia or pneumothorax.     He agrees to be admitted and all questions were answered.  Spoke with Dr. Jimenez who agreed to admission.     Due to language barrier, an  was present during the history-taking and subsequent discussion (and for part of the physical exam) with this patient.    Critical Care Time: was 35 minutes for this patient excluding procedures    Diagnosis:     ICD-10-CM    1. NSTEMI (non-ST elevated myocardial infarction) (H)  I21.4    2. Chest pain, unspecified type  R07.9      Scribe Disclosure:  I, Lashon Singh, am serving as a scribe at 4:55 PM on 10/20/2021 to document services personally performed by Chidi Bullock MD based on my observations and the provider's statements to me.              Chidi Bullock MD  10/20/21 3186

## 2021-10-20 NOTE — H&P
[facility]    History and Physical - Hospitalist Service       Date of Admission:  10/20/2021    Assessment & Plan      Son Shy is a 80 year old male with history of hypertension, hyperlipidemia, type 2 diabetes, GERD, among others who presented to the ED with complaints of substernal 10/10 chest pain for several minutes earlier today. He has also had some palpitations in the past week and reports PCP adjusting beta blocker at home.       NSTEMI  No known history of CAD.  Patient does have a history of hypertension hyperlipidemia and diabetes.  Reports about 1 week of palpitations and chest pain, nonexertional, started today and was relieved by drinking less water.  Patient's home medications were switched about 2 weeks ago by PCP-atenolol replaced by metoprolol and patient started chlorthalidone.  Activity tolerance has not changed and he denies any difficulty sleeping lying flat or lower extremity edema.  Troponin elevated at 0.189, EKG with some changes but not overt ST elevation.  - Heart center inpatient admission  - Given aspirin in ED, will continue daily ASA  - Continue heparin infusion  - Cardiology consultation  - Telemetry monitoring  - Nitroglycerin as needed  - Trend troponins  - Lipid panel  - Echocardiogram    Hypertension  Hyperlipidemia  BP slightly elevated in ED.  PTA regimen awaited, will resume as appropriate.  Can utilize as needed hydralazine as needed.    Type 2 diabetes mellitus  PTA regimen awaited-it does not look like he is on insulin.  - I do not see hemoglobin A1c in our records, will add this on  - Sliding scale insulin for the time being    GERD  Seems stable recently, PTA regimen awaited.    COVID-19 screening  Low suspicion, PCR negative 10/20/2021      Diet:   cardiac, diabetic, NPO at midnight  DVT Prophylaxis: Heparin gtt  [place] Catheter: PRESENT, indication:    Central Lines: None  Code Status:   Full Code -discussed in detail and confirmed upon  admission with the help of his son interpreting    Clinically Significant Risk Factors Present on Admission                   Disposition Plan   Expected discharge: 2 days pending clinical course and cardiology evaluation     The patient's care was discussed with the Bedside Nurse, Patient, Patient's Family and ED MD.    Leonidas Jimenez MD  [facility]  Securely message with the Vocera Web Console (learn more here)  Text page via [facility] Paging/Directory      ______________________________________________________________________    Chief Complaint   Chest pain    History is obtained from the patient with son interpreting    History of Present Illness   Son Shy is a 80 year old male with history of hypertension, hyperlipidemia, type 2 diabetes, GERD, among others who presented to the ED with complaints of chest pain and palpitations.  Patient apparently had been having palpitations for about a week and of note about 2 weeks ago was family doctor switched him from atenolol to metoprolol and also started chlorthalidone.  Today was the first time he had chest pain which was substernal and breathing was not affected.  It was nonradiating, substernal pressure, and a 10 out of 10 lasting for several minutes.  He actually drank some water which seemed to alleviate the pain.  He reports no change in activity tolerance, lower extremity edema, weight change, or difficulty lying down to sleep.  Otherwise no recent fevers, chills, coughing, shortness of breath, abdominal pain, nausea, vomiting, or diarrhea.  He presents to the ED with his son who is able to provide translating.    In the ED patient seen by Dr. Bullock with whom I discussed the case.  Patient is afebrile, hemodynamically stable, borderline tachycardic, has normal respiratory rate, and is saturating normally on room air.  He is no longer symptomatic.  Lab work shows unremarkable BMP slight elevated blood sugar in the 200s, CBC with slight  leukocytosis 12.2 and anemia with hemoglobin 11.1.  Troponin elevated 0.189.  EKG has some changes in the inferior leads but no overt ST elevation reported.  Chest x-ray is pending.  Patient has been given aspirin and started on heparin infusion and will be admitted to the heart center for NSTEMI.    Review of Systems    The 10 point Review of Systems is negative other than noted in the HPI or here.     Past Medical History    I have reviewed this patient's medical history and updated it with pertinent information if needed.   Past Medical History:   Diagnosis Date     Diabetes (H)      Hypertension        Past Surgical History   I have reviewed this patient's surgical history and updated it with pertinent information if needed.  History reviewed. No pertinent surgical history.    Social History   I have reviewed this patient's social history and updated it with pertinent information if needed.  Social History     Tobacco Use     Smoking status: Former Smoker     Quit date: 5/3/1979     Years since quittin.4     Smokeless tobacco: Never Used   Substance Use Topics     Alcohol use: Not Currently     Drug use: Never       Family History     HTN in both parents    Prior to Admission Medications   Prior to Admission Medications   Prescriptions Last Dose Informant Patient Reported? Taking?   amLODIPine (NORVASC) 10 MG tablet   Yes No   Sig: Take 10 mg by mouth daily   atenolol-chlorthalidone (TENORETIC) 100-25 MG tablet   Yes No   Sig: Take 1 tablet by mouth daily   atorvastatin (LIPITOR) 40 MG tablet   Yes No   Sig: Take 40 mg by mouth daily   cloNIDine (CATAPRES) 0.1 MG tablet   Yes No   Sig: Take 0.1 mg by mouth 2 times daily   glipiZIDE (GLUCOTROL XL) 2.5 MG 24 hr tablet   Yes No   Sig: Take 5 mg by mouth daily (with lunch)   irbesartan (AVAPRO) 300 MG tablet   Yes No   Sig: Take 300 mg by mouth daily   metFORMIN (GLUCOPHAGE-XR) 500 MG 24 hr tablet   Yes No   Sig: Take 1,000 mg by mouth 2 times daily (with  meals)      Facility-Administered Medications: None     Allergies   Allergies   Allergen Reactions     Zaira-McGrath      Prinivil [Lisinopril]        Physical Exam   Vital Signs: Temp: 97.9  F (36.6  C) Temp src: Temporal BP: (!) 149/81 Pulse: 102   Resp: 14 SpO2: 100 % O2 Device: None (Room air)    Weight: 192 lbs 0 oz    Gen: NAD, pleasant  HEENT: Normocephalic, EOMI, MMM  Resp: no crackles,  no wheezes, no increased work of resp  CV: S1S2 heard, reg rhythm, reg rate, no pedal edema  Abdo: soft, nontender, nondistended, bowel sounds present  Ext: calves nontender, well perfused  Neuro: AAOx3, CN grossly intact, no facial asymmetry      Data   Data reviewed today: I reviewed all medications, new labs and imaging results over the last 24 hours. I personally reviewed no images or EKG's today.    Recent Labs   Lab 10/20/21  1519   WBC 12.2*   HGB 11.1*   MCV 76*   *      POTASSIUM 3.8   CHLORIDE 104   CO2 24   BUN 21   CR 0.97   ANIONGAP 8   MÓNICA 8.3*   *   TROPONIN 0.189*     Recent Results (from the past 24 hour(s))   XR Chest 2 Views    Narrative    EXAM: XR CHEST 2 VW  LOCATION: [facility]  DATE/TIME: 10/20/2021 5:38 PM    INDICATION: Chest pain  COMPARISON: None.      Impression    IMPRESSION:     Cardiac silhouette is normal in size. There are atheromatous calcifications of the aorta but no findings to suggest aortic enlargement. Hilar contours and lung vascularity are normal    Symmetric lung inflation. No interstitial or alveolar lung opacities. Diaphragm curvature is preserved. There is no pleural fluid.    There are small flowing degenerative osteophytes through most of the thoracic spine but no vertebral compression deformity or aggressive/destructive bone lesion. No displaced rib fractures are detected.

## 2021-10-20 NOTE — Clinical Note
Max pressure = 18 yue. Total duration = 45 seconds.     Max pressure = 22 yue. Total duration = 45 seconds.    Balloon reinflated a second time: Max pressure = 22 yue. Total duration = 45 seconds.  Balloon reinflated a third time: Max pressure = 20 yue. Total duration = 45 seconds.

## 2021-10-21 ENCOUNTER — APPOINTMENT (OUTPATIENT)
Dept: CARDIOLOGY | Facility: CLINIC | Age: 81
DRG: 247 | End: 2021-10-21
Attending: HOSPITALIST
Payer: COMMERCIAL

## 2021-10-21 LAB
ACT BLD: 142 SECONDS (ref 74–150)
ACT BLD: 251 SECONDS (ref 74–150)
ACT BLD: 284 SECONDS (ref 74–150)
CHOLEST SERPL-MCNC: 91 MG/DL
ERYTHROCYTE [DISTWIDTH] IN BLOOD BY AUTOMATED COUNT: 17.1 % (ref 10–15)
GLUCOSE BLDC GLUCOMTR-MCNC: 116 MG/DL (ref 70–99)
GLUCOSE BLDC GLUCOMTR-MCNC: 125 MG/DL (ref 70–99)
GLUCOSE BLDC GLUCOMTR-MCNC: 144 MG/DL (ref 70–99)
GLUCOSE BLDC GLUCOMTR-MCNC: 158 MG/DL (ref 70–99)
GLUCOSE BLDC GLUCOMTR-MCNC: 172 MG/DL (ref 70–99)
HCT VFR BLD AUTO: 35.2 % (ref 40–53)
HDLC SERPL-MCNC: 40 MG/DL
HGB BLD-MCNC: 11.2 G/DL (ref 13.3–17.7)
LDLC SERPL CALC-MCNC: 39 MG/DL
LVEF ECHO: NORMAL
MCH RBC QN AUTO: 23.9 PG (ref 26.5–33)
MCHC RBC AUTO-ENTMCNC: 31.8 G/DL (ref 31.5–36.5)
MCV RBC AUTO: 75 FL (ref 78–100)
NONHDLC SERPL-MCNC: 51 MG/DL
PLATELET # BLD AUTO: 386 10E3/UL (ref 150–450)
RBC # BLD AUTO: 4.69 10E6/UL (ref 4.4–5.9)
TRIGL SERPL-MCNC: 58 MG/DL
TROPONIN I SERPL-MCNC: 0.36 UG/L (ref 0–0.04)
UFH PPP CHRO-ACNC: 0.45 IU/ML
WBC # BLD AUTO: 11.5 10E3/UL (ref 4–11)

## 2021-10-21 PROCEDURE — B2111ZZ FLUOROSCOPY OF MULTIPLE CORONARY ARTERIES USING LOW OSMOLAR CONTRAST: ICD-10-PCS | Performed by: INTERNAL MEDICINE

## 2021-10-21 PROCEDURE — 258N000003 HC RX IP 258 OP 636: Performed by: INTERNAL MEDICINE

## 2021-10-21 PROCEDURE — C1874 STENT, COATED/COV W/DEL SYS: HCPCS | Performed by: INTERNAL MEDICINE

## 2021-10-21 PROCEDURE — 99233 SBSQ HOSP IP/OBS HIGH 50: CPT | Performed by: HOSPITALIST

## 2021-10-21 PROCEDURE — 99223 1ST HOSP IP/OBS HIGH 75: CPT | Mod: 25 | Performed by: INTERNAL MEDICINE

## 2021-10-21 PROCEDURE — 93306 TTE W/DOPPLER COMPLETE: CPT

## 2021-10-21 PROCEDURE — 93306 TTE W/DOPPLER COMPLETE: CPT | Mod: 26 | Performed by: INTERNAL MEDICINE

## 2021-10-21 PROCEDURE — 250N000013 HC RX MED GY IP 250 OP 250 PS 637: Performed by: INTERNAL MEDICINE

## 2021-10-21 PROCEDURE — 85520 HEPARIN ASSAY: CPT | Performed by: HOSPITALIST

## 2021-10-21 PROCEDURE — 92928 PRQ TCAT PLMT NTRAC ST 1 LES: CPT | Mod: LC | Performed by: INTERNAL MEDICINE

## 2021-10-21 PROCEDURE — 85027 COMPLETE CBC AUTOMATED: CPT | Performed by: HOSPITALIST

## 2021-10-21 PROCEDURE — 250N000013 HC RX MED GY IP 250 OP 250 PS 637: Performed by: HOSPITALIST

## 2021-10-21 PROCEDURE — 250N000011 HC RX IP 250 OP 636: Performed by: INTERNAL MEDICINE

## 2021-10-21 PROCEDURE — C9600 PERC DRUG-EL COR STENT SING: HCPCS | Mod: LC | Performed by: INTERNAL MEDICINE

## 2021-10-21 PROCEDURE — 80061 LIPID PANEL: CPT | Performed by: HOSPITALIST

## 2021-10-21 PROCEDURE — 250N000009 HC RX 250: Performed by: INTERNAL MEDICINE

## 2021-10-21 PROCEDURE — 93454 CORONARY ARTERY ANGIO S&I: CPT | Performed by: INTERNAL MEDICINE

## 2021-10-21 PROCEDURE — 250N000011 HC RX IP 250 OP 636: Performed by: EMERGENCY MEDICINE

## 2021-10-21 PROCEDURE — 84484 ASSAY OF TROPONIN QUANT: CPT | Performed by: HOSPITALIST

## 2021-10-21 PROCEDURE — 210N000002 HC R&B HEART CARE

## 2021-10-21 PROCEDURE — 99152 MOD SED SAME PHYS/QHP 5/>YRS: CPT | Performed by: INTERNAL MEDICINE

## 2021-10-21 PROCEDURE — C1725 CATH, TRANSLUMIN NON-LASER: HCPCS | Performed by: INTERNAL MEDICINE

## 2021-10-21 PROCEDURE — 93454 CORONARY ARTERY ANGIO S&I: CPT | Mod: 26 | Performed by: INTERNAL MEDICINE

## 2021-10-21 PROCEDURE — 027034Z DILATION OF CORONARY ARTERY, ONE ARTERY WITH DRUG-ELUTING INTRALUMINAL DEVICE, PERCUTANEOUS APPROACH: ICD-10-PCS | Performed by: INTERNAL MEDICINE

## 2021-10-21 PROCEDURE — 250N000013 HC RX MED GY IP 250 OP 250 PS 637: Performed by: STUDENT IN AN ORGANIZED HEALTH CARE EDUCATION/TRAINING PROGRAM

## 2021-10-21 PROCEDURE — C1887 CATHETER, GUIDING: HCPCS | Performed by: INTERNAL MEDICINE

## 2021-10-21 PROCEDURE — 36415 COLL VENOUS BLD VENIPUNCTURE: CPT | Performed by: HOSPITALIST

## 2021-10-21 PROCEDURE — 99153 MOD SED SAME PHYS/QHP EA: CPT | Performed by: INTERNAL MEDICINE

## 2021-10-21 PROCEDURE — C1760 CLOSURE DEV, VASC: HCPCS | Performed by: INTERNAL MEDICINE

## 2021-10-21 PROCEDURE — 99152 MOD SED SAME PHYS/QHP 5/>YRS: CPT | Mod: GC | Performed by: INTERNAL MEDICINE

## 2021-10-21 PROCEDURE — 272N000001 HC OR GENERAL SUPPLY STERILE: Performed by: INTERNAL MEDICINE

## 2021-10-21 PROCEDURE — C1769 GUIDE WIRE: HCPCS | Performed by: INTERNAL MEDICINE

## 2021-10-21 PROCEDURE — 93005 ELECTROCARDIOGRAM TRACING: CPT

## 2021-10-21 PROCEDURE — 85347 COAGULATION TIME ACTIVATED: CPT

## 2021-10-21 DEVICE — STENT CORONARY DES SYNERGY XD MR US 3.50X24MM H7493941824350: Type: IMPLANTABLE DEVICE | Status: FUNCTIONAL

## 2021-10-21 RX ORDER — NALOXONE HYDROCHLORIDE 0.4 MG/ML
0.2 INJECTION, SOLUTION INTRAMUSCULAR; INTRAVENOUS; SUBCUTANEOUS
Status: ACTIVE | OUTPATIENT
Start: 2021-10-21 | End: 2021-10-21

## 2021-10-21 RX ORDER — ASPIRIN 81 MG/1
243 TABLET, CHEWABLE ORAL ONCE
Status: COMPLETED | OUTPATIENT
Start: 2021-10-21 | End: 2021-10-21

## 2021-10-21 RX ORDER — SODIUM CHLORIDE 9 MG/ML
INJECTION, SOLUTION INTRAVENOUS CONTINUOUS
Status: DISCONTINUED | OUTPATIENT
Start: 2021-10-21 | End: 2021-10-21 | Stop reason: HOSPADM

## 2021-10-21 RX ORDER — LORAZEPAM 0.5 MG/1
0.5 TABLET ORAL
Status: DISCONTINUED | OUTPATIENT
Start: 2021-10-21 | End: 2021-10-21 | Stop reason: HOSPADM

## 2021-10-21 RX ORDER — ONDANSETRON 2 MG/ML
4 INJECTION INTRAMUSCULAR; INTRAVENOUS EVERY 6 HOURS PRN
Status: DISCONTINUED | OUTPATIENT
Start: 2021-10-21 | End: 2021-10-22 | Stop reason: HOSPADM

## 2021-10-21 RX ORDER — NITROGLYCERIN 5 MG/ML
VIAL (ML) INTRAVENOUS
Status: DISCONTINUED | OUTPATIENT
Start: 2021-10-21 | End: 2021-10-21 | Stop reason: HOSPADM

## 2021-10-21 RX ORDER — LIDOCAINE 40 MG/G
CREAM TOPICAL
Status: DISCONTINUED | OUTPATIENT
Start: 2021-10-21 | End: 2021-10-21 | Stop reason: HOSPADM

## 2021-10-21 RX ORDER — NITROGLYCERIN 0.4 MG/1
0.4 TABLET SUBLINGUAL EVERY 5 MIN PRN
Status: DISCONTINUED | OUTPATIENT
Start: 2021-10-21 | End: 2021-10-22 | Stop reason: HOSPADM

## 2021-10-21 RX ORDER — ASPIRIN 81 MG/1
81 TABLET ORAL DAILY
Status: DISCONTINUED | OUTPATIENT
Start: 2021-10-22 | End: 2021-10-22 | Stop reason: HOSPADM

## 2021-10-21 RX ORDER — SODIUM CHLORIDE 9 MG/ML
INJECTION, SOLUTION INTRAVENOUS CONTINUOUS
Status: ACTIVE | OUTPATIENT
Start: 2021-10-21 | End: 2021-10-21

## 2021-10-21 RX ORDER — OXYCODONE HYDROCHLORIDE 5 MG/1
5 TABLET ORAL EVERY 4 HOURS PRN
Status: DISCONTINUED | OUTPATIENT
Start: 2021-10-21 | End: 2021-10-22 | Stop reason: HOSPADM

## 2021-10-21 RX ORDER — FLUMAZENIL 0.1 MG/ML
0.2 INJECTION, SOLUTION INTRAVENOUS
Status: ACTIVE | OUTPATIENT
Start: 2021-10-21 | End: 2021-10-21

## 2021-10-21 RX ORDER — OXYCODONE HYDROCHLORIDE 5 MG/1
10 TABLET ORAL EVERY 4 HOURS PRN
Status: DISCONTINUED | OUTPATIENT
Start: 2021-10-21 | End: 2021-10-22 | Stop reason: HOSPADM

## 2021-10-21 RX ORDER — FENTANYL CITRATE 50 UG/ML
25 INJECTION, SOLUTION INTRAMUSCULAR; INTRAVENOUS
Status: DISCONTINUED | OUTPATIENT
Start: 2021-10-21 | End: 2021-10-22 | Stop reason: HOSPADM

## 2021-10-21 RX ORDER — HEPARIN SODIUM 1000 [USP'U]/ML
INJECTION, SOLUTION INTRAVENOUS; SUBCUTANEOUS
Status: DISCONTINUED | OUTPATIENT
Start: 2021-10-21 | End: 2021-10-21 | Stop reason: HOSPADM

## 2021-10-21 RX ORDER — HYDRALAZINE HYDROCHLORIDE 20 MG/ML
10 INJECTION INTRAMUSCULAR; INTRAVENOUS EVERY 4 HOURS PRN
Status: DISCONTINUED | OUTPATIENT
Start: 2021-10-21 | End: 2021-10-22 | Stop reason: HOSPADM

## 2021-10-21 RX ORDER — METOPROLOL TARTRATE 1 MG/ML
5 INJECTION, SOLUTION INTRAVENOUS
Status: DISCONTINUED | OUTPATIENT
Start: 2021-10-21 | End: 2021-10-22 | Stop reason: HOSPADM

## 2021-10-21 RX ORDER — FENTANYL CITRATE 50 UG/ML
INJECTION, SOLUTION INTRAMUSCULAR; INTRAVENOUS
Status: DISCONTINUED | OUTPATIENT
Start: 2021-10-21 | End: 2021-10-21 | Stop reason: HOSPADM

## 2021-10-21 RX ORDER — NALOXONE HYDROCHLORIDE 0.4 MG/ML
0.4 INJECTION, SOLUTION INTRAMUSCULAR; INTRAVENOUS; SUBCUTANEOUS
Status: ACTIVE | OUTPATIENT
Start: 2021-10-21 | End: 2021-10-21

## 2021-10-21 RX ORDER — POTASSIUM CHLORIDE 1500 MG/1
20 TABLET, EXTENDED RELEASE ORAL
Status: COMPLETED | OUTPATIENT
Start: 2021-10-21 | End: 2021-10-21

## 2021-10-21 RX ORDER — ACETAMINOPHEN 325 MG/1
650 TABLET ORAL EVERY 4 HOURS PRN
Status: DISCONTINUED | OUTPATIENT
Start: 2021-10-21 | End: 2021-10-22 | Stop reason: HOSPADM

## 2021-10-21 RX ORDER — ONDANSETRON 4 MG/1
4 TABLET, ORALLY DISINTEGRATING ORAL EVERY 6 HOURS PRN
Status: DISCONTINUED | OUTPATIENT
Start: 2021-10-21 | End: 2021-10-22 | Stop reason: HOSPADM

## 2021-10-21 RX ORDER — ASPIRIN 81 MG/1
81 TABLET, CHEWABLE ORAL ONCE
Status: DISCONTINUED | OUTPATIENT
Start: 2021-10-21 | End: 2021-10-22

## 2021-10-21 RX ORDER — ATROPINE SULFATE 0.1 MG/ML
0.5 INJECTION INTRAVENOUS
Status: ACTIVE | OUTPATIENT
Start: 2021-10-21 | End: 2021-10-21

## 2021-10-21 RX ORDER — IOPAMIDOL 755 MG/ML
INJECTION, SOLUTION INTRAVASCULAR
Status: DISCONTINUED | OUTPATIENT
Start: 2021-10-21 | End: 2021-10-21 | Stop reason: HOSPADM

## 2021-10-21 RX ORDER — ASPIRIN 325 MG
325 TABLET ORAL ONCE
Status: COMPLETED | OUTPATIENT
Start: 2021-10-21 | End: 2021-10-21

## 2021-10-21 RX ORDER — LORAZEPAM 2 MG/ML
0.5 INJECTION INTRAMUSCULAR
Status: DISCONTINUED | OUTPATIENT
Start: 2021-10-21 | End: 2021-10-21 | Stop reason: HOSPADM

## 2021-10-21 RX ADMIN — CHLORTHALIDONE 25 MG: 25 TABLET ORAL at 20:14

## 2021-10-21 RX ADMIN — TICAGRELOR 90 MG: 90 TABLET ORAL at 21:40

## 2021-10-21 RX ADMIN — HEPARIN SODIUM 1050 UNITS/HR: 10000 INJECTION, SOLUTION INTRAVENOUS at 10:40

## 2021-10-21 RX ADMIN — SODIUM CHLORIDE: 9 INJECTION, SOLUTION INTRAVENOUS at 11:57

## 2021-10-21 RX ADMIN — POTASSIUM CHLORIDE 20 MEQ: 1500 TABLET, EXTENDED RELEASE ORAL at 12:16

## 2021-10-21 RX ADMIN — ASPIRIN 325 MG ORAL TABLET 325 MG: 325 PILL ORAL at 11:58

## 2021-10-21 RX ADMIN — METOPROLOL TARTRATE 12.5 MG: 25 TABLET, FILM COATED ORAL at 20:14

## 2021-10-21 RX ADMIN — METOPROLOL TARTRATE 12.5 MG: 25 TABLET, FILM COATED ORAL at 08:28

## 2021-10-21 RX ADMIN — CLONIDINE HYDROCHLORIDE 0.1 MG: 0.1 TABLET ORAL at 08:28

## 2021-10-21 RX ADMIN — CLONIDINE HYDROCHLORIDE 0.1 MG: 0.1 TABLET ORAL at 20:14

## 2021-10-21 RX ADMIN — ATORVASTATIN CALCIUM 40 MG: 40 TABLET, FILM COATED ORAL at 20:13

## 2021-10-21 ASSESSMENT — ACTIVITIES OF DAILY LIVING (ADL)
ADLS_ACUITY_SCORE: 11

## 2021-10-21 NOTE — PLAN OF CARE
A&Ox4, denies pain or SOB. Tele SR, VSS on RA. Angiogram today with 1 stent to OM. Right groin site with angioseal, leaking without signs of hematoma. Femostop placed to belt pressure. CMS intact. Plan for cardiac rehab in am.   PT PRESENTS TO ED WITH REPORTED LEFT EYE CORNEAL TEAR. PT STATES SHE WAS AT 
URGENT CARE ON 9/14 AND SEEN BY URGENT CARE PHYSICIAN. SHE STATES URGENT CARE 
MD TOLD HER SHE HAS A LEFT EYE CORNEAL TEAR. SHE WAS GIVEN CREAM FOR HER EYE 
AND CONTINUES TO C/O PAIN. BOTH EYES INTACT. PT STATES RIGHT EYES DIFFICULT TO 
OPEN D/T LEFT EYE PAIN. LEFT EYE BLURRY WHEN OPEN. VSS. POSITIONED IN BED WITH 
HOB ELEVATED AND SIDE RAIL UP. ER MD AWARE. CONTINUE TO MONITOR.

## 2021-10-21 NOTE — PLAN OF CARE
A&OX4, RA, BP better after getting BP meds. Denies CP/SOB, troponin trending up. On heparin infusing at 1050 units/hr. Up with SBA, Tele- SR with 1st AVB, BG 95 and 125. NPO since midnight.    [id number]: NPO, , denies pain.

## 2021-10-21 NOTE — PROGRESS NOTES
[facility]    Hospitalist Progress Note    Assessment & Plan      Son Shy is a 80 year old male with history of hypertension, hyperlipidemia, type 2 diabetes, GERD, among others who presented to the ED with complaints of substernal chest pain. Admitted fro NSTEMI.          NSTEMI  Asymptomatic this am. TTE with normal EF and positive for WMA.  - Cont ASA, hep gtt, statin, bb, prn NTG. t.   - Telemetry monitoring  - Further plan per cards (angiogram planned).     Hypertension  Hyperlipidemia  On BB, clonidine and diuretic/ACEI PTA. BP seems controlled.  - Cont BB/clonidine.  - Hold ACEI/chrlothalidone given planned angiogram.  - Prn hydralazine for now.      Type 2 diabetes mellitus  PTA regimen is glipizide and metformin. A1C is 7.9.   - Hold PTA meds.  - sliding scale insulin for now.  - Might add low dose long acting if sugras rise.     GERD  Seems stable.     COVID-19 screening  Low suspicion, PCR negative 10/20/2021        Diet:   cardiac, diabetic. NPO. IVF.  DVT Prophylaxis: Heparin gtt  Code Status:   Full Code         Disposition Plan  When ok with cards (1-2 days likely).       Jossie Jordan MD      Interval History   Doing ok this am. Denies cp/sob. Fa,lindsay member at bedside.     Physical Exam   Temp: 98.7  F (37.1  C) Temp src: Oral BP: 137/76 Pulse: 87   Resp: 18 SpO2: 96 % O2 Device: None (Room air)    Vitals:    10/20/21 1714   Weight: 87.1 kg (192 lb)     Vital Signs with Ranges  Temp:  [97.9  F (36.6  C)-98.7  F (37.1  C)] 98.7  F (37.1  C)  Pulse:  [] 87  Resp:  [12-21] 18  BP: (119-170)/(71-93) 137/76  SpO2:  [96 %-100 %] 96 %  I/O last 3 completed shifts:  In: -   Out: 900 [Urine:400; Emesis/NG output:500]    Constitutional: Awake, alert, cooperative, no apparent distress  Respiratory: Clear to auscultation bilaterally, no crackles or wheezing  Cardiovascular: Regular rate and rhythm, normal S1 and S2, and no murmur noted  GI: Normal bowel sounds, soft,  non-distended, non-tender  Skin/Integumen: No rashes, no cyanosis, no edema  Other:     Medications     heparin 1,050 Units/hr (10/21/21 1040)     - MEDICATION INSTRUCTIONS -         atorvastatin  40 mg Oral QPM     chlorthalidone  25 mg Oral QPM     cloNIDine  0.1 mg Oral BID     insulin aspart  1-7 Units Subcutaneous TID AC     insulin aspart  1-5 Units Subcutaneous At Bedtime     metoprolol tartrate  12.5 mg Oral BID     sodium chloride (PF)  3 mL Intracatheter Q8H       Data   Recent Labs   Lab 10/21/21  0805 10/21/21  0553 10/21/21  0201 10/21/21  0111 10/20/21  2310 10/20/21  2037 10/20/21  1519   WBC  --  11.5*  --   --   --   --  12.2*   HGB  --  11.2*  --   --   --   --  11.1*   MCV  --  75*  --   --   --   --  76*   PLT  --  386  --   --   --   --  472*   NA  --   --   --   --   --   --  136   POTASSIUM  --   --   --   --   --   --  3.8   CHLORIDE  --   --   --   --   --   --  104   CO2  --   --   --   --   --   --  24   BUN  --   --   --   --   --   --  21   CR  --   --   --   --   --   --  0.97   ANIONGAP  --   --   --   --   --   --  8   MÓNICA  --   --   --   --   --   --  8.3*   *  --  125*  --   --  95 288*   TROPONIN  --   --   --  0.359* 0.350*  --  0.189*       Recent Results (from the past 24 hour(s))   XR Chest 2 Views    Narrative    EXAM: XR CHEST 2 VW  LOCATION: [facility]  DATE/TIME: 10/20/2021 5:38 PM    INDICATION: Chest pain  COMPARISON: None.      Impression    IMPRESSION:     Cardiac silhouette is normal in size. There are atheromatous calcifications of the aorta but no findings to suggest aortic enlargement. Hilar contours and lung vascularity are normal    Symmetric lung inflation. No interstitial or alveolar lung opacities. Diaphragm curvature is preserved. There is no pleural fluid.    There are small flowing degenerative osteophytes through most of the thoracic spine but no vertebral compression deformity or aggressive/destructive bone lesion. No  displaced rib fractures are detected.   Echocardiogram Complete   Result Value    LVEF  55-60%    [facility]    [id number]  [id number]  [id number]  [id number]^ARLIN^CHRIS^CLAUDIA     [facility]  Echocardiography Laboratory  [address] [place] [id number]     Name: GHULAM LOPEZ  MRN: [id number]  : 1940  Study Date: 10/21/2021 09:13 AM  Age: 80 yrs  Gender: Male  Patient Location: [facility]  Reason For Study: MI, Hypertension (HTN)  Ordering Physician: CHRIS OLIVEROS  Referring Physician: CHRIS OLIVEROS  Performed By: Celia Menjivar     BSA: 2.0 m2  Height: 66 in  Weight: 192 lb  BP: 170/93 mmHg  ______________________________________________________________________________  Procedure  Complete Echo Adult.  ______________________________________________________________________________  Interpretation Summary     1. The left ventricle is normal in size. There is normal left ventricular wall  thickness. Left ventricular systolic function is normal. The visual ejection  fraction is 55-60%. Diastolic Doppler findings (E/E' ratio and/or other  parameters) suggest left ventricular filling pressures are indeterminate.  There is mild hypokinesis of the mid anteroseptal and apical septal walls.  2. The right ventricle is normal size. The right ventricular systolic function  is normal.  3. Moderate left atrial enlargement.  4. Mildly dilated ascending thoracic aorta.  5. No pericardial effusion.  6. No previous study for comparison.  ______________________________________________________________________________  Left Ventricle  The left ventricle is normal in size. There is normal left ventricular wall  thickness. Left ventricular systolic function is normal. The visual ejection  fraction is 55-60%. Diastolic Doppler findings (E/E' ratio and/or other  parameters) suggest left ventricular filling pressures are indeterminate.  There is mild hypokinesis of the mid anteroseptal and apical septal  walls.     Right Ventricle  The right ventricle is normal size. The right ventricular systolic function is  normal.     Atria  The left atrium is moderately dilated. Right atrial size is normal. There is  no color Doppler evidence of an atrial shunt.     Mitral Valve  There is trace mitral regurgitation.     Tricuspid Valve  There is trace tricuspid regurgitation. Right ventricular systolic pressure  could not be approximated due to inadequate tricuspid regurgitation.     Aortic Valve  There is moderate trileaflet aortic sclerosis. There is discrete nodular  thickening of the non- coronary cusp. No aortic regurgitation is present. No  aortic stenosis is present.     Pulmonic Valve  There is no pulmonic valvular stenosis.     Vessels  The aortic root is normal size. The ascending aorta is Mildly dilated. The  inferior vena cava is normal.     Pericardium  There is no pericardial effusion.     Rhythm  Sinus rhythm was noted.  ______________________________________________________________________________  MMode/2D Measurements & Calculations     IVSd: 1.1 cm  LVIDd: 4.8 cm  LVIDs: 2.4 cm  LVPWd: 0.72 cm  FS: 49.6 %  LV mass(C)d: 147.0 grams  LV mass(C)dI: 74.8 grams/m2  Ao root diam: 3.3 cm  asc Aorta Diam: 3.8 cm  LVOT diam: 2.1 cm  LVOT area: 3.4 cm2  LA Volume (BP): 85.3 ml  LA Volume Index (BP): 43.3 ml/m2  RWT: 0.30     Doppler Measurements & Calculations  MV E max arvin: 71.3 cm/sec  MV A max arvin: 112.0 cm/sec  MV E/A: 0.64  Ao V2 max: 154.7 cm/sec  Ao max PG: 10.0 mmHg  ROSEANN(V,D): 2.6 cm2  LV V1 max P.6 mmHg  LV V1 max: 118.4 cm/sec  LV V1 VTI: 25.2 cm  SV(LVOT): 86.9 ml  SI(LVOT): 44.2 ml/m2  PA V2 max: 175.1 cm/sec  PA max P.3 mmHg  PA acc time: 0.10 sec  AV Arvin Ratio (DI): 0.76  Lateral E/e': 10.9     ______________________________________________________________________________  Report approved by: Cornelia Garland 10/21/2021 10:36 AM

## 2021-10-21 NOTE — ED NOTES
[facility]  ED Nurse Handoff Report    ED Chief complaint: Chest Pain and Tachycardia      ED Diagnosis:   Final diagnoses:   NSTEMI (non-ST elevated myocardial infarction) (H)   Chest pain, unspecified type       Code Status: Hospitalist to address    Allergies:   Allergies   Allergen Reactions     Janet      Prinivil [Lisinopril]        Patient Story: Presents with 2 weeks of elevated heart rate,intermittent chest pain. Started on two new blood pressure medications within these 2 weeks.     Focused Assessment:  A&Ox4, skin intact. Denies chest pain. Arrived with tachycardic rhythm, now NSR. Lungs clear, 20g Right IV.    Treatments and/or interventions provided: labs, ekg, chest xray, heparin gtt  Patient's response to treatments and/or interventions: stable, VS stable     To be done/followed up on inpatient unit:  admission orders    Does this patient have any cognitive concerns?: none    Activity level - Baseline/Home:  Independent  Activity Level - Current:   Independent    Patient's Preferred language: Citizen of [place]   Needed?: Yes    Isolation: None  Infection: Not Applicable  Patient tested for COVID 19 prior to admission: YES  Bariatric?: No    Vital Signs:   Vitals:    10/20/21 1714 10/20/21 1730 10/20/21 1800 10/20/21 1830   BP:  129/76 136/80 128/78   Pulse:  92 92 87   Resp:  21 12 15   Temp:       TempSrc:       SpO2:  99% 99% 99%   Weight: 87.1 kg (192 lb)        Labs Ordered and Resulted from Time of ED Arrival Up to the Time of Departure from the ED   BASIC METABOLIC PANEL - Abnormal; Notable for the following components:       Result Value    Calcium 8.3 (*)     Glucose 288 (*)     All other components within normal limits   TROPONIN I - Abnormal; Notable for the following components:    Troponin I 0.189 (*)     All other components within normal limits   CBC WITH PLATELETS AND DIFFERENTIAL - Abnormal; Notable for the following components:    WBC Count 12.2 (*)      Hemoglobin 11.1 (*)     Hematocrit 36.2 (*)     MCV 76 (*)     MCH 23.3 (*)     MCHC 30.7 (*)     RDW 17.2 (*)     Platelet Count 472 (*)     All other components within normal limits   DIFFERENTIAL - Abnormal; Notable for the following components:    Absolute Lymphocytes 7.2 (*)     Elliptocytes Moderate (*)     RBC Fragments Moderate (*)     Smudge Cells Present (*)     All other components within normal limits   COVID-19 VIRUS (CORONAVIRUS) BY PCR - Normal    Narrative:     Testing was performed using the parisa  SARS-CoV-2 & Influenza A/B Assay on the parisa  María  System.  This test should be ordered for the detection of SARS-COV-2 in individuals who meet SARS-CoV-2 clinical and/or epidemiological criteria. Test performance is unknown in asymptomatic patients.  This test is for in vitro diagnostic use under the FDA EUA for laboratories certified under CLIA to perform moderate and/or high complexity testing. This test has not been FDA cleared or approved.  A negative test does not rule out the presence of PCR inhibitors in the specimen or target RNA in concentration below the limit of detection for the assay. The possibility of a false negative should be considered if the patient's recent exposure or clinical presentation suggests COVID-19.  [facility] are certified under the Clinical Laboratory Improvement Amendments of 1988 (CLIA-88) as qualified to perform moderate and/or high complexity laboratory testing.   TSH WITH FREE T4 REFLEX - Normal   EXTRA BLUE TOP TUBE   EXTRA RED TOP TUBE   EXTRA GREEN TOP (LITHIUM HEPARIN) TUBE   EXTRA PURPLE TOP TUBE   PERIPHERAL IV CATHETER   MEASURE WEIGHT   NOTIFY PHYSICIAN   NOTIFY PHYSICIAN   EXTRA TUBE    Narrative:     The following orders were created for panel order [place] Draw.  Procedure                               Abnormality         Status                     ---------                               -----------         ------                      Extra Blue Top Tube[[id number]]                              Final result               Extra Red Top Tube[[id number]]                               Final result               Extra Green Top (Lithium...[[id number]]                      Final result               Extra Purple Top Tube[[id number]]                            Final result                 Please view results for these tests on the individual orders.   CBC WITH PLATELETS & DIFFERENTIAL    Narrative:     The following orders were created for panel order CBC with Platelets & Differential.  Procedure                               Abnormality         Status                     ---------                               -----------         ------                     CBC with platelets and d...[[id number]]  Abnormal            Final result               Manual Differential[[id number]]          Abnormal            Final result                 Please view results for these tests on the individual orders.       Cardiac Rhythm:Cardiac Rhythm: Sinus tachycardia    Was the PSS-3 completed:   Yes  What interventions are required if any?               Family Comments: son at bedside  OBS brochure/video discussed/provided to patient/family: N/A              Name of person given brochure if not patient: n/a              Relationship to patient: n/a    For the majority of the shift this patient's behavior was Green.   Behavioral interventions performed were none.    ED NURSE PHONE NUMBER: *[id number]

## 2021-10-21 NOTE — PROVIDER NOTIFICATION
MD Notification    Notified Person: MD    Notified Person Name:Dr. Serrano    Notification Date/Time:10/21/20 0042    Notification Interaction:text/page    Purpose of Notification:Troponin 0.35, denies CP, on heparin gtt.    Orders Received:    Comments:

## 2021-10-21 NOTE — PHARMACY-ADMISSION MEDICATION HISTORY
Pharmacy Medication History  Admission medication history interview status for the 10/20/2021  admission is complete. See EPIC admission navigator for prior to admission medications     Location of Interview: Patient room  Medication history sources: Patient    Significant changes made to the medication list:  Added: Vitamin D, Chlorthalidone, Metoprolol   Removed: Amlodipine, Atenolol-Chlorthalidone    In the past week, patient estimated taking medication this percent of the time: greater than 90%    Additional medication history information:   N/A    Medication reconciliation completed by provider prior to medication history? Yes    Time spent in this activity: 15 minutes     Prior to Admission medications    Medication Sig Last Dose Taking? Auth Provider   atorvastatin (LIPITOR) 40 MG tablet Take 40 mg by mouth daily 10/19/2021 at Unknown time Yes Unknown, Entered By History   chlorthalidone (HYGROTON) 25 MG tablet Take 1 tablet by mouth daily 10/19/2021 at Unknown time Yes Unknown, Entered By History   cloNIDine (CATAPRES) 0.1 MG tablet Take 0.1 mg by mouth 2 times daily 10/20/2021 at AM Yes Unknown, Entered By History   glipiZIDE (GLUCOTROL XL) 2.5 MG 24 hr tablet Take 5 mg by mouth daily (with lunch) 10/20/2021 at AM Yes Unknown, Entered By History   irbesartan (AVAPRO) 300 MG tablet Take 300 mg by mouth daily 10/19/2021 at Unknown time Yes Unknown, Entered By History   metFORMIN (GLUCOPHAGE-XR) 500 MG 24 hr tablet Take 1,000 mg by mouth 2 times daily (with meals) 10/20/2021 at AM Yes Unknown, Entered By History   metoprolol succinate ER (TOPROL-XL) 50 MG 24 hr tablet Take 50 mg by mouth daily 10/19/2021 at Unknown time Yes Unknown, Entered By History   Vitamin D3 (CHOLECALCIFEROL) 25 mcg (1000 units) tablet Take 1 tablet by mouth daily 10/19/2021 at Unknown time Yes Unknown, Entered By History       The information provided in this note is only as accurate as the sources available at the time of update(s)

## 2021-10-21 NOTE — PROGRESS NOTES
RECEIVING UNIT ED HANDOFF REVIEW    ED Nurse Handoff Report was reviewed by: Belkis Lewis RN on October 20, 2021 at 8:03 PM

## 2021-10-21 NOTE — CONSULTS
Cardiology Consult Note          Assessment and Plan:     NSTEMI (non-ST elevated myocardial infarction) (H)    Chest pain, unspecified type      Discussed alternatives of stress testing vs. Angiogram.   Patient and family prefer coronary angiogram.  Risks and benefits explained thru cosme Serra on the phone including stroke, heart attack, death.  IV heparin, BB, coronary angiogram, Statin    Hypertension  Hyperlipidemia  DM  Previous history of COVID +                 Interval History:     Mr. Begum is a 80-year-old [place] who is history of was obtained directly through him as well as with the help through the phone of his granddaughter Maryse who works in the dermatology clinic here in [facility].  Has a history of type 2 diabetes hyperlipidemia hypertension gastroesophageal reflux who had severe 10-10 sharp midsternal chest discomfort yesterday which lasted for approximately 5 minutes or so.  There was no radiation.  There is no associated nausea vomiting shortness of breath.  His troponin was initially elevated at 0.189 and increased to 0.3.  EKG had nonspecific changes without significant ST depression or elevation.  Given troponin positivity cardiology was consulted.    Echocardiogram is being done and I reviewed it while I saw the patient in bed.  Was essentially normal with no wall motion abnormality and no significant valvular abnormality.                Review of Systems:   As per subjective, otherwise 5 systems reviewed and negative.           Physical Exam:   Blood pressure 137/76, pulse 87, temperature 98.7  F (37.1  C), temperature source Oral, resp. rate 18, weight 87.1 kg (192 lb), SpO2 96 %.      Vital Sign Ranges  Temperature Temp  Av.4  F (36.9  C)  Min: 97.9  F (36.6  C)  Max: 98.7  F (37.1  C)   Blood pressure Systolic (24hrs), Av , Min:119 , Max:170        Diastolic (24hrs), Av, Min:71, Max:93      Pulse Pulse  Av.9  Min: 87  Max: 109   Respirations Resp  Av.4   Min: 12  Max: 21   Pulse oximetry SpO2  Av.7 %  Min: 96 %  Max: 100 %         Intake/Output Summary (Last 24 hours) at 10/21/2021 0937  Last data filed at 10/21/2021 0320  Gross per 24 hour   Intake --   Output 900 ml   Net -900 ml       Constitutional:   NAD   Skin:   Warm and dry   Head:   Nontraumatic   Neck:   Supple, symmetrical, trachea midline, no adenopathy, thyroid symmetric, not enlarged and no tenderness, skin normal   Lungs:   normal   Cardiovascular:   Normal apical impulse, regular rate and rhythm, normal S1 and S2, no S3 or S4, and no murmur noted    Abdomen:   Benign   Extremities and Back:   Symmetric, no curvature, spinous processes are non-tender on palpation, paraspinous muscles are non-tender on palpation, no costal vertebral tenderness   Neurological:   Grossly nonfocal            Medications:     No current outpatient medications on file.                Data:     Results for orders placed or performed during the hospital encounter of 10/20/21 (from the past 24 hour(s))   EKG 12 lead   Result Value Ref Range    Systolic Blood Pressure  mmHg    Diastolic Blood Pressure  mmHg    Ventricular Rate 105 BPM    Atrial Rate 105 BPM    NE Interval 220 ms    QRS Duration 106 ms     ms    QTc 526 ms    P Axis 60 degrees    R AXIS 51 degrees    T Axis 41 degrees    Interpretation ECG       Sinus tachycardia with 1st degree A-V block  Anterior infarct , age undetermined  Prolonged QT  Abnormal ECG  When compared with ECG of 2020 08:46,  Significant changes have occurred  Confirmed by GENERATED REPORT, COMPUTER ([id number]),  Jana Livingston ([id number]) on 10/20/2021 3:43:46 PM     [place] Draw    Narrative    The following orders were created for panel order [place] Draw.  Procedure                               Abnormality         Status                     ---------                               -----------         ------                     Extra Blue Top Tube[[id number]]                               Final result               Extra Red Top Tube[[id number]]                               Final result               Extra Green Top (Lithium...[[id number]]                      Final result               Extra Purple Top Tube[[id number]]                            Final result                 Please view results for these tests on the individual orders.   CBC with Platelets & Differential    Narrative    The following orders were created for panel order CBC with Platelets & Differential.  Procedure                               Abnormality         Status                     ---------                               -----------         ------                     CBC with platelets and d...[[id number]]  Abnormal            Final result               Manual Differential[[id number]]          Abnormal            Final result                 Please view results for these tests on the individual orders.   Basic metabolic panel   Result Value Ref Range    Sodium 136 133 - 144 mmol/L    Potassium 3.8 3.4 - 5.3 mmol/L    Chloride 104 94 - 109 mmol/L    Carbon Dioxide (CO2) 24 20 - 32 mmol/L    Anion Gap 8 3 - 14 mmol/L    Urea Nitrogen 21 7 - 30 mg/dL    Creatinine 0.97 0.66 - 1.25 mg/dL    Calcium 8.3 (L) 8.5 - 10.1 mg/dL    Glucose 288 (H) 70 - 99 mg/dL    GFR Estimate 74 >60 mL/min/1.73m2   Troponin I   Result Value Ref Range    Troponin I 0.189 (HH) 0.000 - 0.045 ug/L   Extra Blue Top Tube   Result Value Ref Range    Hold Specimen JIC    Extra Red Top Tube   Result Value Ref Range    Hold Specimen JIC    Extra Green Top (Lithium Heparin) Tube   Result Value Ref Range    Hold Specimen JIC    Extra Purple Top Tube   Result Value Ref Range    Hold Specimen JIC    CBC with platelets and differential   Result Value Ref Range    WBC Count 12.2 (H) 4.0 - 11.0 10e3/uL    RBC Count 4.76 4.40 - 5.90 10e6/uL    Hemoglobin 11.1 (L) 13.3 - 17.7 g/dL    Hematocrit 36.2 (L) 40.0 - 53.0 %    MCV 76 (L) 78 - 100 fL    MCH 23.3 (L) 26.5 -  33.0 pg    MCHC 30.7 (L) 31.5 - 36.5 g/dL    RDW 17.2 (H) 10.0 - 15.0 %    Platelet Count 472 (H) 150 - 450 10e3/uL   TSH with free T4 reflex   Result Value Ref Range    TSH 3.05 0.40 - 4.00 mU/L   Manual Differential   Result Value Ref Range    % Neutrophils 38 %    % Lymphocytes 59 %    % Monocytes 3 %    % Eosinophils 0 %    % Basophils 0 %    Absolute Neutrophils 4.6 1.6 - 8.3 10e3/uL    Absolute Lymphocytes 7.2 (H) 0.8 - 5.3 10e3/uL    Absolute Monocytes 0.4 0.0 - 1.3 10e3/uL    Absolute Eosinophils 0.0 0.0 - 0.7 10e3/uL    Absolute Basophils 0.0 0.0 - 0.2 10e3/uL    RBC Morphology Confirmed RBC Indices     Platelet Assessment  Automated Count Confirmed. Platelet morphology is normal.     Automated Count Confirmed. Platelet morphology is normal.    Elliptocytes Moderate (A) None Seen    RBC Fragments Moderate (A) None Seen    Smudge Cells Present (A) None Seen   Hemoglobin A1c   Result Value Ref Range    Hemoglobin A1C 7.9 (H) 0.0 - 5.6 %   Asymptomatic COVID-19 Virus (Coronavirus) by PCR Nasopharyngeal    Specimen: Nasopharyngeal; Swab   Result Value Ref Range    SARS CoV2 PCR Negative Negative    Narrative    Testing was performed using the parisa  SARS-CoV-2 & Influenza A/B Assay on the parisa  [employer]  System.  This test should be ordered for the detection of SARS-COV-2 in individuals who meet SARS-CoV-2 clinical and/or epidemiological criteria. Test performance is unknown in asymptomatic patients.  This test is for in vitro diagnostic use under the FDA EUA for laboratories certified under CLIA to perform moderate and/or high complexity testing. This test has not been FDA cleared or approved.  A negative test does not rule out the presence of PCR inhibitors in the specimen or target RNA in concentration below the limit of detection for the assay. The possibility of a false negative should be considered if the patient's recent exposure or clinical presentation suggests COVID-19.  [facility]  are certified under the Clinical Laboratory Improvement Amendments of 1988 (CLIA-88) as qualified to perform moderate and/or high complexity laboratory testing.   XR Chest 2 Views    Narrative    EXAM: XR CHEST 2 VW  LOCATION: [facility]  DATE/TIME: 10/20/2021 5:38 PM    INDICATION: Chest pain  COMPARISON: None.      Impression    IMPRESSION:     Cardiac silhouette is normal in size. There are atheromatous calcifications of the aorta but no findings to suggest aortic enlargement. Hilar contours and lung vascularity are normal    Symmetric lung inflation. No interstitial or alveolar lung opacities. Diaphragm curvature is preserved. There is no pleural fluid.    There are small flowing degenerative osteophytes through most of the thoracic spine but no vertebral compression deformity or aggressive/destructive bone lesion. No displaced rib fractures are detected.   Glucose by meter   Result Value Ref Range    GLUCOSE BY METER POCT 95 70 - 99 mg/dL   Troponin I   Result Value Ref Range    Troponin I 0.350 (HH) 0.000 - 0.045 ug/L   Heparin Unfractionated Anti Xa Level   Result Value Ref Range    Anti Xa Unfractionated Heparin 0.48 For Reference Range, See Comment IU/mL    Narrative    Therapeutic Range: UFH: 0.25-0.50 IU/mL for low intensity dosing,  0.30-0.70 IU/mL for high intensity dosing DVT and PE.  This test is not validated for other direct factor X inhibitors (e.g. rivaroxaban, apixaban, edoxaban, betrixaban, fondaparinux) and should not be used for monitoring of other medications.   Troponin I   Result Value Ref Range    Troponin I 0.359 (HH) 0.000 - 0.045 ug/L   Glucose by meter   Result Value Ref Range    GLUCOSE BY METER POCT 125 (H) 70 - 99 mg/dL   CBC with platelets   Result Value Ref Range    WBC Count 11.5 (H) 4.0 - 11.0 10e3/uL    RBC Count 4.69 4.40 - 5.90 10e6/uL    Hemoglobin 11.2 (L) 13.3 - 17.7 g/dL    Hematocrit 35.2 (L) 40.0 - 53.0 %    MCV 75 (L) 78 - 100 fL    MCH 23.9  (L) 26.5 - 33.0 pg    MCHC 31.8 31.5 - 36.5 g/dL    RDW 17.1 (H) 10.0 - 15.0 %    Platelet Count 386 150 - 450 10e3/uL   Lipid Profile   Result Value Ref Range    Cholesterol 91 <200 mg/dL    Triglycerides 58 <150 mg/dL    Direct Measure HDL 40 >=40 mg/dL    LDL Cholesterol Calculated 39 <=100 mg/dL    Non HDL Cholesterol 51 <130 mg/dL    Narrative    Cholesterol  Desirable:  <200 mg/dL    Triglycerides  Normal:  Less than 150 mg/dL  Borderline High:  150-199 mg/dL  High:  200-499 mg/dL  Very High:  Greater than or equal to 500 mg/dL    Direct Measure HDL  Female:  Greater than or equal to 50 mg/dL   Male:  Greater than or equal to 40 mg/dL    LDL Cholesterol  Desirable:  <100mg/dL  Above Desirable:  100-129 mg/dL   Borderline High:  130-159 mg/dL   High:  160-189 mg/dL   Very High:  >= 190 mg/dL    Non HDL Cholesterol  Desirable:  130 mg/dL  Above Desirable:  130-159 mg/dL  Borderline High:  160-189 mg/dL  High:  190-219 mg/dL  Very High:  Greater than or equal to 220 mg/dL   Heparin Unfractionated Anti Xa Level   Result Value Ref Range    Anti Xa Unfractionated Heparin 0.45 For Reference Range, See Comment IU/mL    Narrative    Therapeutic Range: UFH: 0.25-0.50 IU/mL for low intensity dosing,  0.30-0.70 IU/mL for high intensity dosing DVT and PE.  This test is not validated for other direct factor X inhibitors (e.g. rivaroxaban, apixaban, edoxaban, betrixaban, fondaparinux) and should not be used for monitoring of other medications.   Glucose by meter   Result Value Ref Range    GLUCOSE BY METER POCT 144 (H) 70 - 99 mg/dL

## 2021-10-21 NOTE — PROVIDER NOTIFICATION
MD Notification    Notified Person: MD    Notified Person Name: Zach    Notification Date/Time: 10/21/21 6:31 PM    Notification Interaction: Text mesaage    Purpose of Notification: Pt with leaky site post angiogram. Angioseal in place, tried holding pressure & thrombix. No sign of hematoma.    Orders Received: See order for femstop.    Comments:

## 2021-10-22 ENCOUNTER — APPOINTMENT (OUTPATIENT)
Dept: PHYSICAL THERAPY | Facility: CLINIC | Age: 81
DRG: 247 | End: 2021-10-22
Attending: STUDENT IN AN ORGANIZED HEALTH CARE EDUCATION/TRAINING PROGRAM
Payer: COMMERCIAL

## 2021-10-22 VITALS
DIASTOLIC BLOOD PRESSURE: 77 MMHG | OXYGEN SATURATION: 99 % | WEIGHT: 181.9 LBS | TEMPERATURE: 98.4 F | HEART RATE: 94 BPM | RESPIRATION RATE: 18 BRPM | SYSTOLIC BLOOD PRESSURE: 128 MMHG | BODY MASS INDEX: 29.37 KG/M2

## 2021-10-22 LAB
ANION GAP SERPL CALCULATED.3IONS-SCNC: 6 MMOL/L (ref 3–14)
BUN SERPL-MCNC: 16 MG/DL (ref 7–30)
CALCIUM SERPL-MCNC: 8.3 MG/DL (ref 8.5–10.1)
CHLORIDE BLD-SCNC: 107 MMOL/L (ref 94–109)
CO2 SERPL-SCNC: 26 MMOL/L (ref 20–32)
CREAT SERPL-MCNC: 0.96 MG/DL (ref 0.66–1.25)
ERYTHROCYTE [DISTWIDTH] IN BLOOD BY AUTOMATED COUNT: 17.1 % (ref 10–15)
GFR SERPL CREATININE-BSD FRML MDRD: 75 ML/MIN/1.73M2
GLUCOSE BLD-MCNC: 137 MG/DL (ref 70–99)
GLUCOSE BLDC GLUCOMTR-MCNC: 128 MG/DL (ref 70–99)
HCT VFR BLD AUTO: 34.9 % (ref 40–53)
HGB BLD-MCNC: 10.8 G/DL (ref 13.3–17.7)
MCH RBC QN AUTO: 23.1 PG (ref 26.5–33)
MCHC RBC AUTO-ENTMCNC: 30.9 G/DL (ref 31.5–36.5)
MCV RBC AUTO: 75 FL (ref 78–100)
PLATELET # BLD AUTO: 409 10E3/UL (ref 150–450)
POTASSIUM BLD-SCNC: 3.5 MMOL/L (ref 3.4–5.3)
RBC # BLD AUTO: 4.68 10E6/UL (ref 4.4–5.9)
SODIUM SERPL-SCNC: 139 MMOL/L (ref 133–144)
UFH PPP CHRO-ACNC: <0.1 IU/ML
WBC # BLD AUTO: 11 10E3/UL (ref 4–11)

## 2021-10-22 PROCEDURE — 250N000013 HC RX MED GY IP 250 OP 250 PS 637: Performed by: HOSPITALIST

## 2021-10-22 PROCEDURE — 97530 THERAPEUTIC ACTIVITIES: CPT | Mod: GP

## 2021-10-22 PROCEDURE — 93005 ELECTROCARDIOGRAM TRACING: CPT

## 2021-10-22 PROCEDURE — 99232 SBSQ HOSP IP/OBS MODERATE 35: CPT | Performed by: INTERNAL MEDICINE

## 2021-10-22 PROCEDURE — 250N000013 HC RX MED GY IP 250 OP 250 PS 637: Performed by: STUDENT IN AN ORGANIZED HEALTH CARE EDUCATION/TRAINING PROGRAM

## 2021-10-22 PROCEDURE — 97161 PT EVAL LOW COMPLEX 20 MIN: CPT | Mod: GP

## 2021-10-22 PROCEDURE — 85041 AUTOMATED RBC COUNT: CPT | Performed by: HOSPITALIST

## 2021-10-22 PROCEDURE — 36415 COLL VENOUS BLD VENIPUNCTURE: CPT | Performed by: HOSPITALIST

## 2021-10-22 PROCEDURE — 99239 HOSP IP/OBS DSCHRG MGMT >30: CPT | Performed by: HOSPITALIST

## 2021-10-22 PROCEDURE — 97110 THERAPEUTIC EXERCISES: CPT | Mod: GP

## 2021-10-22 PROCEDURE — 85520 HEPARIN ASSAY: CPT | Performed by: HOSPITALIST

## 2021-10-22 PROCEDURE — 82374 ASSAY BLOOD CARBON DIOXIDE: CPT | Performed by: HOSPITALIST

## 2021-10-22 RX ORDER — METOPROLOL SUCCINATE 50 MG/1
50 TABLET, EXTENDED RELEASE ORAL DAILY
Status: DISCONTINUED | OUTPATIENT
Start: 2021-10-22 | End: 2021-10-22 | Stop reason: HOSPADM

## 2021-10-22 RX ORDER — NITROGLYCERIN 0.3 MG/1
TABLET SUBLINGUAL
Qty: 10 TABLET | Refills: 0 | Status: SHIPPED | OUTPATIENT
Start: 2021-10-22 | End: 2023-12-06

## 2021-10-22 RX ADMIN — ASPIRIN 81 MG: 81 TABLET, COATED ORAL at 08:59

## 2021-10-22 RX ADMIN — TICAGRELOR 90 MG: 90 TABLET ORAL at 08:59

## 2021-10-22 RX ADMIN — CLONIDINE HYDROCHLORIDE 0.1 MG: 0.1 TABLET ORAL at 08:59

## 2021-10-22 RX ADMIN — METOPROLOL TARTRATE 12.5 MG: 25 TABLET, FILM COATED ORAL at 08:59

## 2021-10-22 ASSESSMENT — ACTIVITIES OF DAILY LIVING (ADL)
ADLS_ACUITY_SCORE: 11
WALKING_OR_CLIMBING_STAIRS_DIFFICULTY: NO
ADLS_ACUITY_SCORE: 9
ADLS_ACUITY_SCORE: 5
FALL_HISTORY_WITHIN_LAST_SIX_MONTHS: NO
ADLS_ACUITY_SCORE: 11
PATIENT_/_FAMILY_COMMUNICATION_STYLE: SPOKEN LANGUAGE (NON-ENGLISH)
DOING_ERRANDS_INDEPENDENTLY_DIFFICULTY: NO
ADLS_ACUITY_SCORE: 7
ADLS_ACUITY_SCORE: 11
WEAR_GLASSES_OR_BLIND: NO
HEARING_DIFFICULTY_OR_DEAF: NO
CONCENTRATING,_REMEMBERING_OR_MAKING_DECISIONS_DIFFICULTY: NO
DIFFICULTY_EATING/SWALLOWING: NO
ADLS_ACUITY_SCORE: 9
INTERPRETER_SERVICES_OFFERED_TO_THE_PATIENT: YES
ADLS_ACUITY_SCORE: 11
DIFFICULTY_COMMUNICATING: NO
ADLS_ACUITY_SCORE: 11
DRESSING/BATHING_DIFFICULTY: NO
ADLS_ACUITY_SCORE: 9
TOILETING_ISSUES: NO
ADLS_ACUITY_SCORE: 9
ADLS_ACUITY_SCORE: 11

## 2021-10-22 NOTE — PROGRESS NOTES
10/22/21 0800   Quick Adds   Type of Visit Initial PT Evaluation   Living Environment   Current Living Arrangements house   Home Accessibility stairs to enter home;stairs within home   Number of Stairs, Main Entrance 4   Stair Railings, Main Entrance railing on right side (ascending)   Number of Stairs, Within Home, Primary 6   Stair Railings, Within Home, Primary railing on right side (ascending)   Self-Care   Usual Activity Tolerance good   Current Activity Tolerance moderate   Equipment Currently Used at Home none   Disability/Function   Fall history within last six months no   Change in Functional Status Since Onset of Current Illness/Injury no   General Information   Onset of Illness/Injury or Date of Surgery 10/20/21   Referring Physician Catrina Kirby MD   Patient/Family Therapy Goals Statement (PT) Discharge home   Pertinent History of Current Problem (include personal factors and/or comorbidities that impact the POC) Patient admitted on 10/20/21 for chest pain and tachycardia, found to have NSTEMI. Patient now s/p PCI to OM1 lesion with plan for staged intervention. Patient with PMH of type 2 diabetes hyperlipidemia hypertension gastroesophageal reflux.    Existing Precautions/Restrictions fall   Weight-Bearing Status - LLE full weight-bearing   Weight-Bearing Status - RLE full weight-bearing   Pain Assessment   Patient Currently in Pain No   Integumentary/Edema   Integumentary/Edema no deficits were identifed   Posture    Posture Forward head position   Range of Motion (ROM)   ROM Comment B LE ROM WNL    Strength   Strength Comments Not formally assessed but at least 3+/5 with functional transfers and gait   Bed Mobility   Comment (Bed Mobility) Supine>sit independently   Transfers   Transfer Safety Comments Sit>stand independently   Gait/Stairs (Locomotion)   Comment (Gait/Stairs) Gait x 10 feet independently   Balance   Balance no deficits were identified   Clinical Impression   Criteria for  Skilled Therapeutic Intervention yes, treatment indicated   PT Diagnosis (PT) Impaired tolerance to activity   Influenced by the following impairments Decreased activity tolrance   Functional limitations due to impairments weakness, decreased tolerance to activity   Clinical Presentation Stable/Uncomplicated   Clinical Presentation Rationale Based on PMH, current presentation, and social support    Clinical Decision Making (Complexity) low complexity   Therapy Frequency (PT) 2x/day   Predicted Duration of Therapy Intervention (days/wks) 2 days   Planned Therapy Interventions (PT) home exercise program;stair training;progressive activity/exercise;risk factor education;home program guidelines;gait training   Risk & Benefits of therapy have been explained evaluation/treatment results reviewed;care plan/treatment goals reviewed;risks/benefits reviewed;current/potential barriers reviewed;participants voiced agreement with care plan;participants included;patient   PT Discharge Planning    PT Discharge Recommendation (DC Rec) home with assist;home with outpatient cardiac rehab   PT Rationale for DC Rec Patient lives at home with family, is independent at baseline. Patient mobilizing/ambulating independently here. Patient safe to discharge home with A from family for household tasks. Patient would benefit from continued OP CR phase II to further progress exercise program, monitor vitals with activity, and promote overall heart health.    Total Evaluation Time   Total Evaluation Time (Minutes) 7

## 2021-10-22 NOTE — PROGRESS NOTES
Cardiology Progress Note          Assessment and Plan:     NSTEMI (non-ST elevated myocardial infarction) (H)  S/p LCX intervention successful TYRONE  No further CP, SOB  Access site appears good.    Remains in NSR.  Increase toprol to XL 50 q d.   LVEF normal.   Angiogram films reviewed and EKGs    OK to discharge today with follow up.  Plan on stagged intervention to LAD > 30 d                 Interval History:   doing well; no cp, sob, n/v/d, or abd pain.              Review of Systems:   As per subjective, otherwise 5 systems reviewed and negative.           Physical Exam:   Blood pressure 128/77, pulse 94, temperature 98.4  F (36.9  C), temperature source Oral, resp. rate 18, weight 82.5 kg (181 lb 14.4 oz), SpO2 99 %.      Vital Sign Ranges  Temperature Temp  Av  F (36.7  C)  Min: 97.6  F (36.4  C)  Max: 98.6  F (37  C)   Blood pressure Systolic (24hrs), Av , Min:99 , Max:141        Diastolic (24hrs), Av, Min:60, Max:77      Pulse Pulse  Av.4  Min: 57  Max: 94   Respirations Resp  Avg: 15.8  Min: 13  Max: 18   Pulse oximetry SpO2  Av.8 %  Min: 97 %  Max: 100 %         Intake/Output Summary (Last 24 hours) at 10/22/2021 0933  Last data filed at 10/22/2021 0513  Gross per 24 hour   Intake 240 ml   Output 800 ml   Net -560 ml       Constitutional:   NAD   Skin:   Warm and dry   Head:   Nontraumatic   Neck:   Supple, symmetrical, trachea midline, no adenopathy, thyroid symmetric, not enlarged and no tenderness, skin normal   Lungs:   normal   Cardiovascular:   Normal apical impulse, regular rate and rhythm, normal S1 and S2, no S3 or S4, and no murmur noted    Abdomen:   Benign   Extremities and Back:   Symmetric, no curvature, spinous processes are non-tender on palpation, paraspinous muscles are non-tender on palpation, no costal vertebral tenderness   Neurological:   Grossly nonfocal            Medications:     Current Outpatient Medications   Medication Sig Dispense Refill     aspirin  (ASA) 81 MG EC tablet Take 1 tablet (81 mg) by mouth daily Start tomorrow. 30 tablet 3                Data:     Results for orders placed or performed during the hospital encounter of 10/20/21 (from the past 24 hour(s))   Echocardiogram Complete   Result Value Ref Range    LVEF  55-60%     [facility]    [id number]  [id number]  [id number]  [id number]^ARLIN^CHRIS^CLAUDIA     [facility]  Echocardiography Laboratory  [address] [place] [id number]     Name: GHULAM LOPEZ  MRN: [id number]  : 1940  Study Date: 10/21/2021 09:13 AM  Age: 80 yrs  Gender: Male  Patient Location: [facility]  Reason For Study: MI, Hypertension (HTN)  Ordering Physician: CHRIS OLIVEROS  Referring Physician: CHRIS OLIVEROS  Performed By: Celia Menjivar     BSA: 2.0 m2  Height: 66 in  Weight: 192 lb  BP: 170/93 mmHg  ______________________________________________________________________________  Procedure  Complete Echo Adult.  ______________________________________________________________________________  Interpretation Summary     1. The left ventricle is normal in size. There is normal left ventricular wall  thickness. Left ventricular systolic function is normal. The visual ejection  fraction is 55-60%. Diastolic Doppler findings (E/E' ratio and/or other  parameters) suggest left ventricular filling pressures are indeterminate.  There is mild hypokinesis of the mid anteroseptal and apical septal walls.  2. The right ventricle is normal size. The right ventricular systolic function  is normal.  3. Moderate left atrial enlargement.  4. Mildly dilated ascending thoracic aorta.  5. No pericardial effusion.  6. No previous study for comparison.  ______________________________________________________________________________  Left Ventricle  The left ventricle is normal in size. There is normal left ventricular wall  thickness. Left ventricular systolic function is normal. The visual ejection  fraction is 55-60%.  Diastolic Doppler findings (E/E' ratio and/or other  parameters) suggest left ventricular filling pressures are indeterminate.  There is mild hypokinesis of the mid anteroseptal and apical septal walls.     Right Ventricle  The right ventricle is normal size. The right ventricular systolic function is  normal.     Atria  The left atrium is moderately dilated. Right atrial size is normal. There is  no color Doppler evidence of an atrial shunt.     Mitral Valve  There is trace mitral regurgitation.     Tricuspid Valve  There is trace tricuspid regurgitation. Right ventricular systolic pressure  could not be approximated due to inadequate tricuspid regurgitation.     Aortic Valve  There is moderate trileaflet aortic sclerosis. There is discrete nodular  thickening of the non- coronary cusp. No aortic regurgitation is present. No  aortic stenosis is present.     Pulmonic Valve  There is no pulmonic valvular stenosis.     Vessels  The aortic root is normal size. The ascending aorta is Mildly dilated. The  inferior vena cava is normal.     Pericardium  There is no pericardial effusion.     Rhythm  Sinus rhythm was noted.  ______________________________________________________________________________  MMode/2D Measurements & Calculations     IVSd: 1.1 cm  LVIDd: 4.8 cm  LVIDs: 2.4 cm  LVPWd: 0.72 cm  FS: 49.6 %  LV mass(C)d: 147.0 grams  LV mass(C)dI: 74.8 grams/m2  Ao root diam: 3.3 cm  asc Aorta Diam: 3.8 cm  LVOT diam: 2.1 cm  LVOT area: 3.4 cm2  LA Volume (BP): 85.3 ml  LA Volume Index (BP): 43.3 ml/m2  RWT: 0.30     Doppler Measurements & Calculations  MV E max arvin: 71.3 cm/sec  MV A max arvin: 112.0 cm/sec  MV E/A: 0.64  Ao V2 max: 154.7 cm/sec  Ao max PG: 10.0 mmHg  ROSEANN(V,D): 2.6 cm2  LV V1 max P.6 mmHg  LV V1 max: 118.4 cm/sec  LV V1 VTI: 25.2 cm  SV(LVOT): 86.9 ml  SI(LVOT): 44.2 ml/m2  PA V2 max: 175.1 cm/sec  PA max P.3 mmHg  PA acc time: 0.10 sec  AV Arvin Ratio (DI): 0.76  Lateral E/e': 10.9    "  ______________________________________________________________________________  Report approved by: Cornelia Garland 10/21/2021 10:36 AM         Glucose by meter   Result Value Ref Range    GLUCOSE BY METER POCT 158 (H) 70 - 99 mg/dL   Activated clotting time celite, POCT   Result Value Ref Range    Activated Clotting Time (Celite) POCT 142 74 - 150 seconds   Activated clotting time celite, POCT   Result Value Ref Range    Activated Clotting Time (Celite) POCT 284 (H) 74 - 150 seconds   Activated clotting time celite, POCT   Result Value Ref Range    Activated Clotting Time (Celite) POCT 251 (H) 74 - 150 seconds   Cardiac Catheterization    Narrative      99% OM1 lesion s/p successful PCI using a 3.5*24 mm Synergy TYRONE.    Residual 95% lesion in the mid LAD and 70% lesion in the distal LAD -   plan for staged PCI.    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    Intervention details below.     Intervention details:  The left main was engaged using a 6Fr EBU 3.75 guide catheter. Heparin was   used for anticoagulation, maintaining ACT>250 seconds.  A 0.014\" [place] coronary wire was advanced to the distal OM1. The lesion   was predilated using a 3.0*15 mm semi-compliant balloon (which ruptured),   3.0*15 mm NC and a 3.25*10 mm [place] cutting balloon. Once we had   adequate lesion expansion, a 3.5*24 mm Synergy TYRONE was deployed and post   dilated using a 3.5*12 mm NC. Final angiography showed no dissection or   perforation and there was LUIS 3 flow.      EKG 12-lead, tracing only   Result Value Ref Range    Systolic Blood Pressure  mmHg    Diastolic Blood Pressure  mmHg    Ventricular Rate 67 BPM    Atrial Rate 67 BPM    KY Interval 250 ms    QRS Duration 104 ms     ms    QTc 481 ms    P Axis 56 degrees    R AXIS 66 degrees    T Axis 80 degrees    Interpretation ECG       Sinus rhythm with 1st degree A-V block  T wave abnormality, consider anterior ischemia  Prolonged QT  Abnormal ECG  When compared with ECG of " 20-OCT-2021 15:00,  Vent. rate has decreased BY  38 BPM  Criteria for Anterior infarct are no longer Present  Nonspecific T wave abnormality no longer evident in Inferior leads  T wave inversion now evident in Anterior leads  QT has shortened     Glucose by meter   Result Value Ref Range    GLUCOSE BY METER POCT 116 (H) 70 - 99 mg/dL   Glucose by meter   Result Value Ref Range    GLUCOSE BY METER POCT 172 (H) 70 - 99 mg/dL   Glucose by meter   Result Value Ref Range    GLUCOSE BY METER POCT 128 (H) 70 - 99 mg/dL   Heparin Unfractionated Anti Xa Level   Result Value Ref Range    Anti Xa Unfractionated Heparin <0.10 For Reference Range, See Comment IU/mL    Narrative    Therapeutic Range: UFH: 0.25-0.50 IU/mL for low intensity dosing,  0.30-0.70 IU/mL for high intensity dosing DVT and PE.  This test is not validated for other direct factor X inhibitors (e.g. rivaroxaban, apixaban, edoxaban, betrixaban, fondaparinux) and should not be used for monitoring of other medications.   Basic metabolic panel   Result Value Ref Range    Sodium 139 133 - 144 mmol/L    Potassium 3.5 3.4 - 5.3 mmol/L    Chloride 107 94 - 109 mmol/L    Carbon Dioxide (CO2) 26 20 - 32 mmol/L    Anion Gap 6 3 - 14 mmol/L    Urea Nitrogen 16 7 - 30 mg/dL    Creatinine 0.96 0.66 - 1.25 mg/dL    Calcium 8.3 (L) 8.5 - 10.1 mg/dL    Glucose 137 (H) 70 - 99 mg/dL    GFR Estimate 75 >60 mL/min/1.73m2   CBC with platelets   Result Value Ref Range    WBC Count 11.0 4.0 - 11.0 10e3/uL    RBC Count 4.68 4.40 - 5.90 10e6/uL    Hemoglobin 10.8 (L) 13.3 - 17.7 g/dL    Hematocrit 34.9 (L) 40.0 - 53.0 %    MCV 75 (L) 78 - 100 fL    MCH 23.1 (L) 26.5 - 33.0 pg    MCHC 30.9 (L) 31.5 - 36.5 g/dL    RDW 17.1 (H) 10.0 - 15.0 %    Platelet Count 409 150 - 450 10e3/uL   EKG 12-lead, tracing only   Result Value Ref Range    Systolic Blood Pressure  mmHg    Diastolic Blood Pressure  mmHg    Ventricular Rate 94 BPM    Atrial Rate 94 BPM    AZ Interval 214 ms    QRS Duration  106 ms     ms    QTc 490 ms    P Axis 65 degrees    R AXIS 69 degrees    T Axis 77 degrees    Interpretation ECG       Sinus rhythm with 1st degree A-V block  Septal infarct , age undetermined  T wave abnormality, consider anterolateral ischemia  Abnormal ECG  When compared with ECG of 21-OCT-2021 14:31, (unconfirmed)  Septal infarct is now Present  T wave inversion now evident in Lateral leads     Pharmacy Liaison for Medication Coverage    Norah Jalloh     10/22/2021  8:26 AM  Discharge Pharmacy Test Claim    Brilinta is covered through patient's [employer] Part D   plan with an initial copay of $347. Patient has an unmet   deductible of $300. Once met, refill copays of brilinta is $47.   [facility] pharmacy has a one-time use 30-day free trial voucher   available for brilinta.      Norah Hernandez  Covering Marleen Israel 10/22  [facility] Pharmacy Liaison  Ph: [phone] Pager: [phone]

## 2021-10-22 NOTE — CONSULTS
Discharge Pharmacy Test Claim    Brilinta is covered through patient's [employer] Part D plan with an initial copay of $347. Patient has an unmet deductible of $300. Once met, refill copays of brilinta is $47. [facility] has a one-time use 30-day free trial voucher available for brilinta.      Norah Hernandez  Covering Marleen Yehtee 10/22  [facility] Pharmacy Liaison  Ph: [phone] Pager: [phone]

## 2021-10-22 NOTE — CONSULTS
NUTRITION EDUCATION    REASON FOR ASSESSMENT:  Nutrition education on American Heart Association (AHA) Heart Healthy Diet.    NUTRITION HISTORY:  Information obtained from Pt's son    Pt eats three meals a day: rice, eggs, salad, cabbage, and grapes are all foods he likes and will eat frequently. Pt will add salt and pepper to meals and sauces like chili oil and soy sauce. He rarely eats out.     CURRENT DIET ORDER:  Low sat fat, Na < 2400mg    NUTRITION DIAGNOSIS:  Food- and nutrition-related knowledge deficit r/t no prior formal education as evidenced by pt's son report.     INTERVENTIONS:  Nutrition Prescription:    Recommended AHA Heart Healthy Diet    Nutrition education per previous  diet consult:  Implementation:     Nutrition Education (Content):  a) reviewed Heart Healthy Diet guidelines  b) provided heart healthy diet handout  c) [place] low sodium handout    Nutrition Education (Application):  a) Discussed current eating habits and recommended alternative food choices    Anticipate good compliance    Diet Education - refer to Education flowsheet    Goals:    Patient verbalizes understanding of diet     All of the above goals met during education session    Follow Up/Monitoring:    Provided RD contact information for future questions

## 2021-10-22 NOTE — PLAN OF CARE
Physical Therapy Discharge Summary    Reason for therapy discharge:    Discharged to home with A from family and OP CR     Progress towards therapy goal(s). See goals on Care Plan in [facility] electronic health record for goal details.  Goals partially met.  Barriers to achieving goals:   discharge from facility.    Therapy recommendation(s):    Patient lives at home with family, is independent at baseline. Patient mobilizing/ambulating independently here. Patient safe to discharge home with A from family for household tasks. Patient would benefit from continued OP CR phase II to further progress exercise program, monitor vitals with activity, and promote overall heart health.

## 2021-10-22 NOTE — DISCHARGE INSTRUCTIONS
Cardiac Angioplasty/Stent Discharge Instructions - Femoral & Radial    After you go home:      Have an adult stay with you until tomorrow.    Drink extra fluids for 2 days.    You may resume your normal diet.    No smoking       For 24 hours - due to the sedation you received:    Relax and take it easy.    Do NOT make any important or legal decisions.    Do NOT drive or operate machines at home or at work.    Do NOT drink alcohol.    Care of Groin Puncture Sites:      For the first 24 hrs - check the puncture site every 1-2 hours while awake.    For 2 days, when you cough, sneeze, laugh or move your bowels,  hold your hand over the puncture site and press firmly on/above the site    Remove the bandaid after 24 hours. If there is minor oozing, apply another bandaid and remove it after 12 hours.    It is normal to have a small bruise or pea size lump at the site.    You may shower tomorrow. Do NOT take a bath, or use a hot tub or pool for at least 3 days. Do NOT scrub the site. Do not use lotion or powder near the puncture site.    Activity - For 2 days:    Groin site:        No stooping or squatting    Do NOT do any heavy activity such as exercise, lifting, or straining.     No housework, yard work or any activity that make you sweat    Do NOT lift more than 10 pounds    Bleeding:    If you start bleeding from the site in your groin:      Lie down flat and press firmly on/above the site for 10 minutes.    Once bleeding stops, lay flat for 2 hours.     Call [facility] as soon as you can.    Call 911 right away if you have heavy bleeding or bleeding that does not stop.      Medicines:      If you are taking an antiplatelet medication such as Plavix, Brilinta or Effient, do not stop taking it until you talk to your cardiologist.        If you are on Metformin (Glucophage), do not restart it until you have blood tests (within 2 to 3 days after discharge).  After you have your blood drawn, you may restart the Metformin.      Take your medications, including blood thinners, unless your provider tells you not to.      If you take Coumadin (Warfarin), have your INR checked by your provider in  3-5 days. Call your clinic to schedule this.    If you have stopped any medicines, check with your provider about when to restart them.    Follow Up Appointments:      Follow up with [facility] Nurse Practitioner at [facility] of patient preference in 7-10 days.    Cardiac Rehab will contact you for follow up care.    Call the clinic if:      You have increased pain or a large or growing hard lump around the site.    The site is red, swollen, hot or tender.    Blood or fluid is draining from the site.    You have chills or a fever greater than 101 F (38 C).    Your arm or leg feels numb, cool or changes color.    You have hives, a rash or unusual itching.    New pain in the back or belly that you cannot control with Tylenol.    Any questions or concerns.    Other Instructions:      If you received a stent - carry your stent card with you at all times.        [facility] [place]:    [phone] [facility] (7 days a week)

## 2021-10-22 NOTE — DISCHARGE SUMMARY
[facility]  Hospitalist Discharge Summary      Date of Admission:  10/20/2021  Date of Discharge:  10/22/2021  Discharging Provider: Preston Hinton MD      Discharge Diagnoses   NSTEMI  HTN  HLD  DM II  GERD    Follow-ups Needed After Discharge   Follow-up Appointments     Follow-up and recommended labs and tests       Follow up with primary care provider, ANGELA LOPEZ, within 7 days for   hospital follow- up.  No follow up labs or test are needed.  Follow up with Cardiology clinic as instructed.               Discharge Disposition   Discharged to home  Condition at discharge: Stable    Hospital Course   Kostas Begum is a 80 year old male admitted on 10/20/2021.  Past history of hypertension, hyperlipidemia, type 2 diabetes, GERD, among others who presented to the ED with complaints of substernal chest pain. Admitted fro NSTEMI.          NSTEMI s/p PCI to OM1  Serial trop trending up with peak of 0.35. TTE with EF 55-60% with mild hypokinesis of mid anteroseptal and apical septal walls.  Cardiology consulted, underwent angiogram 10/21 with finding of 99% stenosis of OM1 s/p TYRONE, but also residual 95% lesion in the mid LAD and 70% lesion in the distal LAD with plan for staged PCI in follow up.  Lipid panel showed LDL 39, HLD 40.  He will discharge on aspirin, Brilinta, atorvastatin, metoprolol and irbesartan.  Follow up with Cardiology and cardiac rehab as outpatient.      Hypertension  Hyperlipidemia  Continue PTA metoprolol, losartan, clonidine and chlorthalidone.      Type 2 diabetes mellitus  Continue PTA glipizide and metformin. A1C is 7.9.      GERD  Not on treatment.     Consultations This Hospital Stay   PHARMACY IP CONSULT  PHARMACY IP CONSULT  CARDIOLOGY IP CONSULT  NUTRITION SERVICES ADULT IP CONSULT  CARDIAC REHAB IP CONSULT  PHARMACY IP CONSULT  PHARMACY IP CONSULT  PHARMACY LIAISON FOR MEDICATION COVERAGE CONSULT  SMOKING CESSATION PROGRAM IP CONSULT    Code Status   Full  Code    Time Spent on this Encounter   I, Preston Hinton MD, personally saw the patient today and spent greater than 30 minutes discharging this patient.       Preston Hinton MD  [facility]  [address], [address]  [place] [address]  Phone: [phone]  ______________________________________________________________________    Physical Exam   Vital Signs: Temp: 98.4  F (36.9  C) Temp src: Oral BP: 128/77 Pulse: 94   Resp: 18 SpO2: 99 % O2 Device: None (Room air) Oxygen Delivery: 2 LPM  Weight: 181 lbs 14.4 oz  General Appearance: Well nourished male in NAD  Respiratory: lungs CTAB, no wheezes or crackles, no tachypnea   Cardiovascular: RRR, normal s1/s2 without murmur  GI: abdomen soft, normal bowel sounds  Skin: no peripheral edema   Other: Alert and appropriate, cranial nerves grossly intact         Primary Care Physician   ANGELA LOPEZ    Discharge Orders      CARDIAC REHAB REFERRAL      Brief Discharge Instructions    Do NOT stop your aspirin or platelet inhibitor unless directed by your Cardiologist.  These medications help to prevent platelets in your blood from sticking together and forming a clot.  Examples of these medications are:  Ticagrelor (Brilinta), Clopidigrel (Plavix), Prasugrel (Effient)     When to call - Contact the Heart Clinic    You may experience symptoms that require follow-up before your scheduled appointment. Contact the Heart Clinic if you develop: Fever over 100.4o Fahrenheit, that lasts more than one day; Redness, heat, or pus at the puncture site; Change in color or temperature in your groin or leg.     When to call - Reasons to Call 911    If your groin starts to bleed or begins to swell suddenly after leaving the hospital, lie flat and apply firm pressure just above the puncture site for 15 minutes.  If bleeding continues, call 9-1-1.     Precautions - Lifting    NO lifting of more than 10 pounds for at least 3 days.  If you usually lift 50  pounds or more daily, talk with your Cardiologist.     Precautions - Household Activities    Avoid any hard work or tiring activities.  NO physical activity such as mowing the lawn, raking, vacuuming, changing sheets on your bed, snow shoveling, or using a .     Precautions - Active Sports Activities    Avoid any tiring sports activities.  This includes, yard work, jogging, biking, bowling, swimming, tennis or golf, and sexual activity.     Precautions - Elective Dental Work    NO elective dental work for 6 weeks after receiving a stent.     Comfort and Pain Management - Pain after Surgery    Pain after surgery is normal and expected.  Your leg may be sore or stiff for a few days, and your pain will improve with time. You may take Tylenol or a pain medicine recommended by your Cardiologist.     Comfort and Pain Management - Bruising after Surgery    Bruising around the groin area is normal.  It may take 2-3 weeks for this to go away.  It is normal for the bruised area to turn green and/or yellow as it is healing.  A small lump may also be present and may last 2-3 months.     Activity - Daily Walking    During the day get up and walk around every 2 hours.     Activity - Light Household Activities    Light household activities are ok.     Activity - Elevate Legs    Elevate legs in between all activities.     Activity - Cardiac Rehab    You are encouraged to enroll in an Outpatient Cardiac Rehab program after discharge from the hospital.  Our Cardiac Rehab staff may visit briefly with you while you're in the hospital.  If they miss you, someone will contact you after you are home.     Return to Driving    Driving is NOT permitted for 24 hours after surgery     Return to work    You may return to work after 72 hours if you are feeling well and your job does not involve heavy lifting.     Dressing Removal    You may take off the dressing on your groin the day after your procedure.     Incision Care    Keep the  incision area dry and clean.  You do not need to use a bandage on your incision.     Shower / Bathing    It is ok to shower with regular soap. Pat dry, do not rub. No tub bath for 3 days. No swimming in a pool or hot tub immersion for 1 week     Reason for your hospital stay    You were admitted for a heart attack for which you had a stent placed in an artery of your heart.  You will require additional stents in the near future for other arteries that are narrowed.     Follow-up and recommended labs and tests     Follow up with primary care provider, ANGELA LOPEZ, within 7 days for hospital follow- up.  No follow up labs or test are needed.  Follow up with Cardiology clinic as instructed.     Activity    Your activity upon discharge: activity as tolerated     Diet    Follow this diet upon discharge:       Low Saturated Fat Na <2400 mg       Significant Results and Procedures   Most Recent 3 CBC's:Recent Labs   Lab Test 10/22/21  0549 10/21/21  0553 10/20/21  1519   WBC 11.0 11.5* 12.2*   HGB 10.8* 11.2* 11.1*   MCV 75* 75* 76*    386 472*     Most Recent 3 BMP's:Recent Labs   Lab Test 10/22/21  0549 10/22/21  0206 10/21/21  2108 10/20/21  2037 10/20/21  1519 11/27/20  0852     --   --   --  136 134   POTASSIUM 3.5  --   --   --  3.8 3.3*   CHLORIDE 107  --   --   --  104 96   CO2 26  --   --   --  24 21   BUN 16  --   --   --  21 37*   CR 0.96  --   --   --  0.97 1.34*   ANIONGAP 6  --   --   --  8 17*   MÓNICA 8.3*  --   --   --  8.3* 8.3*   * 128* 172*   < > 288* 255*    < > = values in this interval not displayed.     Most Recent 3 Troponin's:Recent Labs   Lab Test 10/21/21  0111 10/20/21  2310 10/20/21  1519 11/27/20  0852   TROPI  --   --   --  0.275*   TROPONIN 0.359* 0.350* 0.189*  --      Most Recent Cholesterol Panel:Recent Labs   Lab Test 10/21/21  0553   CHOL 91   LDL 39   HDL 40   TRIG 58   ,   Results for orders placed or performed during the hospital encounter of 10/20/21   XR Chest 2  Views    Narrative    EXAM: XR CHEST 2 VW  LOCATION: [facility]  DATE/TIME: 10/20/2021 5:38 PM    INDICATION: Chest pain  COMPARISON: None.      Impression    IMPRESSION:     Cardiac silhouette is normal in size. There are atheromatous calcifications of the aorta but no findings to suggest aortic enlargement. Hilar contours and lung vascularity are normal    Symmetric lung inflation. No interstitial or alveolar lung opacities. Diaphragm curvature is preserved. There is no pleural fluid.    There are small flowing degenerative osteophytes through most of the thoracic spine but no vertebral compression deformity or aggressive/destructive bone lesion. No displaced rib fractures are detected.   Echocardiogram Complete     Value    LVEF  55-60%    Narrative    [id number]  [id number]  [id number]  [id number]^ARLIN^CHRIS^CLAUDIA     [facility]  Echocardiography Laboratory  [address] [place] [address]     Name: GHULAM LOPEZ  MRN: [id number]  : 1940  Study Date: 10/21/2021 09:13 AM  Age: 80 yrs  Gender: Male  Patient Location: [facility]  Reason For Study: MI, Hypertension (HTN)  Ordering Physician: CHRIS OLIVEROS  Referring Physician: CHRIS OLIVEROS  Performed By: Celia Menjivar     BSA: 2.0 m2  Height: 66 in  Weight: 192 lb  BP: 170/93 mmHg  ______________________________________________________________________________  Procedure  Complete Echo Adult.  ______________________________________________________________________________  Interpretation Summary     1. The left ventricle is normal in size. There is normal left ventricular wall  thickness. Left ventricular systolic function is normal. The visual ejection  fraction is 55-60%. Diastolic Doppler findings (E/E' ratio and/or other  parameters) suggest left ventricular filling pressures are indeterminate.  There is mild hypokinesis of the mid anteroseptal and apical septal walls.  2. The right ventricle is  normal size. The right ventricular systolic function  is normal.  3. Moderate left atrial enlargement.  4. Mildly dilated ascending thoracic aorta.  5. No pericardial effusion.  6. No previous study for comparison.  ______________________________________________________________________________  Left Ventricle  The left ventricle is normal in size. There is normal left ventricular wall  thickness. Left ventricular systolic function is normal. The visual ejection  fraction is 55-60%. Diastolic Doppler findings (E/E' ratio and/or other  parameters) suggest left ventricular filling pressures are indeterminate.  There is mild hypokinesis of the mid anteroseptal and apical septal walls.     Right Ventricle  The right ventricle is normal size. The right ventricular systolic function is  normal.     Atria  The left atrium is moderately dilated. Right atrial size is normal. There is  no color Doppler evidence of an atrial shunt.     Mitral Valve  There is trace mitral regurgitation.     Tricuspid Valve  There is trace tricuspid regurgitation. Right ventricular systolic pressure  could not be approximated due to inadequate tricuspid regurgitation.     Aortic Valve  There is moderate trileaflet aortic sclerosis. There is discrete nodular  thickening of the non- coronary cusp. No aortic regurgitation is present. No  aortic stenosis is present.     Pulmonic Valve  There is no pulmonic valvular stenosis.     Vessels  The aortic root is normal size. The ascending aorta is Mildly dilated. The  inferior vena cava is normal.     Pericardium  There is no pericardial effusion.     Rhythm  Sinus rhythm was noted.  ______________________________________________________________________________  MMode/2D Measurements & Calculations     IVSd: 1.1 cm  LVIDd: 4.8 cm  LVIDs: 2.4 cm  LVPWd: 0.72 cm  FS: 49.6 %  LV mass(C)d: 147.0 grams  LV mass(C)dI: 74.8 grams/m2  Ao root diam: 3.3 cm  asc Aorta Diam: 3.8 cm  LVOT diam: 2.1 cm  LVOT area: 3.4  "cm2  LA Volume (BP): 85.3 ml  LA Volume Index (BP): 43.3 ml/m2  RWT: 0.30     Doppler Measurements & Calculations  MV E max arvin: 71.3 cm/sec  MV A max arvin: 112.0 cm/sec  MV E/A: 0.64  Ao V2 max: 154.7 cm/sec  Ao max PG: 10.0 mmHg  ROSEANN(V,D): 2.6 cm2  LV V1 max P.6 mmHg  LV V1 max: 118.4 cm/sec  LV V1 VTI: 25.2 cm  SV(LVOT): 86.9 ml  SI(LVOT): 44.2 ml/m2  PA V2 max: 175.1 cm/sec  PA max P.3 mmHg  PA acc time: 0.10 sec  AV Arvin Ratio (DI): 0.76  Lateral E/e': 10.9     ______________________________________________________________________________  Report approved by: Cornelia Garland 10/21/2021 10:36 AM         Cardiac Catheterization    Narrative      99% OM1 lesion s/p successful PCI using a 3.5*24 mm Synergy TYRONE.    Residual 95% lesion in the mid LAD and 70% lesion in the distal LAD -   plan for staged PCI.    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    Intervention details below.     Intervention details:  The left main was engaged using a 6Fr EBU 3.75 guide catheter. Heparin was   used for anticoagulation, maintaining ACT>250 seconds.  A 0.014\" [place] coronary wire was advanced to the distal OM1. The lesion   was predilated using a 3.0*15 mm semi-compliant balloon (which ruptured),   3.0*15 mm NC and a 3.25*10 mm Newcomb cutting balloon. Once we had   adequate lesion expansion, a 3.5*24 mm Synergy TYRONE was deployed and post   dilated using a 3.5*12 mm NC. Final angiography showed no dissection or   perforation and there was LUIS 3 flow.            Discharge Medications   Current Discharge Medication List      START taking these medications    Details   aspirin (ASA) 81 MG EC tablet Take 1 tablet (81 mg) by mouth daily Start tomorrow.  Qty: 30 tablet, Refills: 3    Associated Diagnoses: NSTEMI (non-ST elevated myocardial infarction) (H)      nitroGLYcerin (NITROSTAT) 0.3 MG sublingual tablet For chest pain place 1 tablet under the tongue every 5 minutes for 3 doses. If symptoms persist 5 minutes after 1st dose call " 911.  Qty: 10 tablet, Refills: 0    Associated Diagnoses: NSTEMI (non-ST elevated myocardial infarction) (H)      ticagrelor (BRILINTA) 90 MG tablet Take 1 tablet (90 mg) by mouth every 12 hours  Qty: 60 tablet, Refills: 11    Associated Diagnoses: NSTEMI (non-ST elevated myocardial infarction) (H)         CONTINUE these medications which have NOT CHANGED    Details   atorvastatin (LIPITOR) 40 MG tablet Take 40 mg by mouth daily      chlorthalidone (HYGROTON) 25 MG tablet Take 1 tablet by mouth daily      cloNIDine (CATAPRES) 0.1 MG tablet Take 0.1 mg by mouth 2 times daily      glipiZIDE (GLUCOTROL XL) 2.5 MG 24 hr tablet Take 5 mg by mouth daily (with lunch)      irbesartan (AVAPRO) 300 MG tablet Take 300 mg by mouth daily      metFORMIN (GLUCOPHAGE-XR) 500 MG 24 hr tablet Take 1,000 mg by mouth 2 times daily (with meals)      metoprolol succinate ER (TOPROL-XL) 50 MG 24 hr tablet Take 50 mg by mouth daily      Vitamin D3 (CHOLECALCIFEROL) 25 mcg (1000 units) tablet Take 1 tablet by mouth daily           Allergies   Allergies   Allergen Reactions     Janet      Prinivil [Lisinopril]

## 2021-10-22 NOTE — PLAN OF CARE
A&OX4, RA, denies pain. Femostop removed ~ 2300, right groin C/D/I CMS intact. No leaking noted, CMS intact.  and 128,   on cardiac diet. Tele- SB HR in the 50s.  BP elevated.

## 2021-10-25 ENCOUNTER — TELEPHONE (OUTPATIENT)
Dept: CARDIOLOGY | Facility: CLINIC | Age: 81
End: 2021-10-25

## 2021-10-25 NOTE — TELEPHONE ENCOUNTER
Patient was evaluated by cardiology while inpatient for chest pain-NSTEMI. PMH: HTN, HLD, DM2, GERD, COVID. TTE with EF 55-60% with mild hypokinesis of mid anteroseptal and apical septal walls. 10/1/21: Coronary angiogram via RRA showed 99% stenosis of OM1 s/p TYRONE, but also residual 95% lesion in the mid LAD and 70% lesion in the distal LAD with plan for staged PCI in follow up. Pt was started on ASA, NTG and Brilinta at time of discharge. Writer called patient to discuss any post hospital d/c questions, review medication changes, and confirm f/u appts, and phone answered by grand daughter, Maryse. RN confirmed with Maryse that patient was d/c with an adequate supply of the antiplatelet Brilinta, and reminded of importance of taking without interruption. Pt has an Rx for PRN SL Nitroglycerin. Maryse denied any questions regarding new medications or changes to PTA medications. Patient reportedly has denied any SOB, chest pain, fever or light headedness. RRA cardiac cath site is without bleeding, swelling, redness or tenderness. RN confirmed with Maryse that patient  has an scheduled on 11/3/21 at 1300 with YANIV Norah Wick. Cardiac rehab is scheduled on 10/28/21 at 0930. Advised to call clinic with any cardiac related questions or concerns prior to this yaniv't, and she verbalized understanding and agreed with plan. GABBY Escoto RN.

## 2021-10-27 LAB
ATRIAL RATE - MUSE: 94 BPM
DIASTOLIC BLOOD PRESSURE - MUSE: NORMAL MMHG
INTERPRETATION ECG - MUSE: NORMAL
P AXIS - MUSE: 65 DEGREES
PR INTERVAL - MUSE: 214 MS
QRS DURATION - MUSE: 106 MS
QT - MUSE: 392 MS
QTC - MUSE: 490 MS
R AXIS - MUSE: 69 DEGREES
SYSTOLIC BLOOD PRESSURE - MUSE: NORMAL MMHG
T AXIS - MUSE: 77 DEGREES
VENTRICULAR RATE- MUSE: 94 BPM

## 2021-10-28 ENCOUNTER — HOSPITAL ENCOUNTER (OUTPATIENT)
Dept: CARDIAC REHAB | Facility: CLINIC | Age: 81
End: 2021-10-28
Attending: STUDENT IN AN ORGANIZED HEALTH CARE EDUCATION/TRAINING PROGRAM
Payer: COMMERCIAL

## 2021-10-28 DIAGNOSIS — I21.4 NSTEMI (NON-ST ELEVATED MYOCARDIAL INFARCTION) (H): ICD-10-CM

## 2021-10-28 LAB
ATRIAL RATE - MUSE: 67 BPM
DIASTOLIC BLOOD PRESSURE - MUSE: NORMAL MMHG
INTERPRETATION ECG - MUSE: NORMAL
P AXIS - MUSE: 56 DEGREES
PR INTERVAL - MUSE: 250 MS
QRS DURATION - MUSE: 104 MS
QT - MUSE: 456 MS
QTC - MUSE: 481 MS
R AXIS - MUSE: 66 DEGREES
SYSTOLIC BLOOD PRESSURE - MUSE: NORMAL MMHG
T AXIS - MUSE: 80 DEGREES
VENTRICULAR RATE- MUSE: 67 BPM

## 2021-10-28 PROCEDURE — 93798 PHYS/QHP OP CAR RHAB W/ECG: CPT

## 2021-10-28 PROCEDURE — 93797 PHYS/QHP OP CAR RHAB WO ECG: CPT

## 2021-11-01 ENCOUNTER — DOCUMENTATION ONLY (OUTPATIENT)
Dept: CARDIOLOGY | Facility: CLINIC | Age: 81
End: 2021-11-01

## 2021-11-01 ENCOUNTER — HOSPITAL ENCOUNTER (OUTPATIENT)
Dept: CARDIAC REHAB | Facility: CLINIC | Age: 81
End: 2021-11-01
Attending: STUDENT IN AN ORGANIZED HEALTH CARE EDUCATION/TRAINING PROGRAM
Payer: COMMERCIAL

## 2021-11-01 DIAGNOSIS — R07.9 CHEST PAIN, UNSPECIFIED TYPE: ICD-10-CM

## 2021-11-01 DIAGNOSIS — I21.4 NSTEMI (NON-ST ELEVATED MYOCARDIAL INFARCTION) (H): Primary | ICD-10-CM

## 2021-11-01 PROCEDURE — 93798 PHYS/QHP OP CAR RHAB W/ECG: CPT | Performed by: CLINICAL EXERCISE PHYSIOLOGIST

## 2021-11-01 NOTE — PROGRESS NOTES
Called [facility] - they connected us to the [place] office where the blood was drawn.  [place] office [phone].    Spoke with staff, they lance a bmp and a cbc cannot be done with that type of tube.  Will message ROZ Norah Wick with update.

## 2021-11-01 NOTE — PROGRESS NOTES
Norah Wick PA-C Krueger, Ashton, EP; DAPHNE Chun [facility] Heart Team 2  Thanks for the update, Kee. It looks like his resting HR in the hospital was 90's, and if he was anxious and a little dehydrated, could be a normal response. Vitals and BMP today look better after his chlorthalidone dose was reduced last week. To be safe though, I will ask my nurses to see if we can add a CBC on to his labs drawn by external provider this morning. I'll assess him further in clinic.             Previous Messages       ----- Message -----   From: Kee Griffin EP   Sent: 10/28/2021  11:44 AM CDT   To: Norah Wick PA-C   Subject: FYI Elevated HR                                   Hi Norah,     I met with Son for his initial evaluation of cardiac rehab today. Patient overall is doing well and tells me he has not experienced angina or needed nitroglycerin. His main complaint in recovery is elevated HR which he reports is often in the 115 bpm range at home. He was 120 bpm NSR with 1st degree AV block at rehab which took about 1 hour to come down to 105 bpm. He took Metoprolol 1.5 hours prior to the appointment. Additionally, his BP was 88/50 coming into rehab. I encouraged him to drink water and to monitor for s/s of lightheadedness. He is anxious about the elevated HR and lower BP and I told him I would send you an FYI.     You can review the session details and EKGs in Epic. I just wanted to let you know before you meet with him on 11/3.     Let me know if you have any questions.     Kee Au

## 2021-11-02 ENCOUNTER — APPOINTMENT (OUTPATIENT)
Dept: INTERPRETER SERVICES | Facility: CLINIC | Age: 81
End: 2021-11-02
Payer: COMMERCIAL

## 2021-11-02 NOTE — PROGRESS NOTES
Per Norah Wick, schedule CBC before appointment tomorrow. Order placed. Called patient via   Services to schedule lab, spoke to patient and appointment made for 1:45pm as no previsit lab slots available.

## 2021-11-03 ENCOUNTER — OFFICE VISIT (OUTPATIENT)
Dept: CARDIOLOGY | Facility: CLINIC | Age: 81
End: 2021-11-03
Payer: COMMERCIAL

## 2021-11-03 ENCOUNTER — LAB (OUTPATIENT)
Dept: LAB | Facility: CLINIC | Age: 81
End: 2021-11-03
Payer: COMMERCIAL

## 2021-11-03 VITALS
DIASTOLIC BLOOD PRESSURE: 76 MMHG | OXYGEN SATURATION: 100 % | WEIGHT: 185 LBS | HEART RATE: 115 BPM | SYSTOLIC BLOOD PRESSURE: 144 MMHG | BODY MASS INDEX: 29.73 KG/M2 | HEIGHT: 66 IN

## 2021-11-03 DIAGNOSIS — I25.10 CORONARY ARTERY DISEASE INVOLVING NATIVE CORONARY ARTERY OF NATIVE HEART WITHOUT ANGINA PECTORIS: Primary | ICD-10-CM

## 2021-11-03 DIAGNOSIS — E11.9 TYPE 2 DIABETES MELLITUS WITHOUT COMPLICATION, WITHOUT LONG-TERM CURRENT USE OF INSULIN (H): ICD-10-CM

## 2021-11-03 DIAGNOSIS — R07.9 CHEST PAIN, UNSPECIFIED TYPE: ICD-10-CM

## 2021-11-03 DIAGNOSIS — I21.4 NSTEMI (NON-ST ELEVATED MYOCARDIAL INFARCTION) (H): ICD-10-CM

## 2021-11-03 LAB
BASOPHILS # BLD AUTO: 0.1 10E3/UL (ref 0–0.2)
BASOPHILS NFR BLD AUTO: 1 %
EOSINOPHIL # BLD AUTO: 0.3 10E3/UL (ref 0–0.7)
EOSINOPHIL NFR BLD AUTO: 2 %
ERYTHROCYTE [DISTWIDTH] IN BLOOD BY AUTOMATED COUNT: 16.4 % (ref 10–15)
HCT VFR BLD AUTO: 35.5 % (ref 40–53)
HGB BLD-MCNC: 11.4 G/DL (ref 13.3–17.7)
IMM GRANULOCYTES # BLD: 0.1 10E3/UL
IMM GRANULOCYTES NFR BLD: 1 %
LYMPHOCYTES # BLD AUTO: 3.4 10E3/UL (ref 0.8–5.3)
LYMPHOCYTES NFR BLD AUTO: 27 %
MCH RBC QN AUTO: 23.7 PG (ref 26.5–33)
MCHC RBC AUTO-ENTMCNC: 32.1 G/DL (ref 31.5–36.5)
MCV RBC AUTO: 74 FL (ref 78–100)
MONOCYTES # BLD AUTO: 1.1 10E3/UL (ref 0–1.3)
MONOCYTES NFR BLD AUTO: 8 %
NEUTROPHILS # BLD AUTO: 8 10E3/UL (ref 1.6–8.3)
NEUTROPHILS NFR BLD AUTO: 61 %
NRBC # BLD AUTO: 0 10E3/UL
NRBC BLD AUTO-RTO: 0 /100
PLATELET # BLD AUTO: 650 10E3/UL (ref 150–450)
RBC # BLD AUTO: 4.81 10E6/UL (ref 4.4–5.9)
WBC # BLD AUTO: 12.9 10E3/UL (ref 4–11)

## 2021-11-03 PROCEDURE — 99215 OFFICE O/P EST HI 40 MIN: CPT | Performed by: PHYSICIAN ASSISTANT

## 2021-11-03 PROCEDURE — 85025 COMPLETE CBC W/AUTO DIFF WBC: CPT | Performed by: PHYSICIAN ASSISTANT

## 2021-11-03 PROCEDURE — 36415 COLL VENOUS BLD VENIPUNCTURE: CPT | Performed by: PHYSICIAN ASSISTANT

## 2021-11-03 RX ORDER — CHLORTHALIDONE 25 MG/1
12.5 TABLET ORAL AT BEDTIME
COMMUNITY
Start: 2021-11-03

## 2021-11-03 RX ORDER — METOPROLOL SUCCINATE 50 MG/1
50 TABLET, EXTENDED RELEASE ORAL 2 TIMES DAILY
COMMUNITY
Start: 2021-11-03 | End: 2022-10-06

## 2021-11-03 RX ORDER — ATENOLOL 25 MG/1
25 TABLET ORAL DAILY
COMMUNITY
End: 2021-11-03

## 2021-11-03 ASSESSMENT — MIFFLIN-ST. JEOR: SCORE: 1501.9

## 2021-11-03 NOTE — LETTER
11/3/2021    ANGELA FARMER [facility] [address]  [facility] [address]    RE: Kostas Begum       Dear Colleague,    I had the pleasure of seeing Kostas Begum in the [facility].    Cardiology Clinic Progress Note    Kostas Begum MRN# [id number]   YOB: 1940 Age: 80 year old   Primary cardiologist: Dr. Otero         Assessment and Plan:     In summary, Kostas Begum presents today for a hospital follow up visit.     1.  CAD, NSTEMI, s/p PCI to 99% OM1 lesion on October 21, 2021 with Dr. Sexton.  Residual 95% mid LAD and 70% distal LAD lesions noted with plans for outpatient staged PCI. On DAPT with Brilinta.   2.  Atypical chest pain, suspect GERD.  3.  Preserved LVEF of 55-60%.  4.  DMII. a1c 7.9%. Managed by PCP, on metformin.  5.  HTN, mildly uncontrolled.   6.  Sinus tachycardia. Improved with reduction in chlorthalidone, but persistent. Notes anxiety.   7.  Mild anemia, Hgb ~11.    Plan:  - Stop atenolol. Metoprolol was just increased by his PCP today.   - CBC today, to follow up on mild anemia. His renal function is normal.   - Encouraged to try SL NTG for resting chest pain, if it recurs.   - Discussed importance of compliance with DAPT for >/= 1 year.  - Plan for staged PCI end of November with Dr. Sexton.    Risks and benefits of left heart catheterization and coronary angiogram were discussed with the patient in detail. 0.1-0.3% (for diagnostic angio) and 1-2% (for PCI)  risk of stroke, MI, death, emergent bypass for diagnostic angio, risk of contrast induced allergic reaction, renal dysfunction, vascular complications were discussed. Patient understands and wishes to proceed.  - Return to see Dr. Otero in 6 months.     Addendum, 11/22: CBC returned with elevated wt ct of 13 and stable Hgb ~11. Pt described no infectious symptoms, however given his tachycardia I directed Son to be seen by his PCP that week. PCP  "directed him to the ED, where white ct was up to 16.7, and blood cultures were negative. He saw PCP after this, who referred him to heme/onc for leukocytosis and thalassemia, but cleared him for PCI as scheduled (unable to see full note). Wt ct has since come down to 14. I discussed this with Dr. Otero, who agrees that patient may proceed with PCI as planned.         History of Presenting Illness:      Son Shy is a pleasant 80 year old patient who presents today for a hospital follow up visit, with aid of a Bel[place]ian  over the phone.    The patient has a history of the following -   1.  CAD, NSTEMI, s/p PCI to 99% OM1 lesion on October 21, 2021 with Dr. Sexton.  Residual 95% mid LAD and 70% distal LAD lesions noted with plans for outpatient staged PCI. On DAPT with Brilinta.   2.  Preserved LVEF of 55-60%.  3.  DMII  4.  HTN  5.  HLP  6. GERD    In brief, we met Son when admitted with chest pain last month. Serial trop peaked at 0.35. TTE showed EF 55-60% with mild hypokinesis of mid anteroseptal and apical septal walls.  He underwent angiogram 10/21 with finding of 99% stenosis of OM1 which was stented successfully. Residual 95% lesion in the mid LAD and 70% lesion in the distal LAD with plan for staged PCI in a month or so.  Lipid panel was well-controlled with an LDL 39, HLD 40 on Lipitor.  He was placed on DAPT with Brilinta.     Today, I am meeting Son for the first time in clinic. He mentions that he can occasionally feel a burning sensation in his chest \"sometimes,\" usually when he lies down, usually, but not always after eating. No other associated symptoms. Hasn't tried SL NTG for this, but wonders if he should. He otherwise has not noticed any chest discomfort. Patient denies shortness of breath, PND, orthopnea, edema, claudication, palpitations, near syncope or syncope.  He has been participating in cardiac rehab.  I received a message from them last week with concerns over elevated heart " "rate in the 120s, which was likely in the setting of mild dehydration, and improved after his chlorthalidone dose was reduced. HR is still a little high in clinic today, as is BP on his ambulatory monitoring and in rehab. States he is anxious for our visit. HR in the hospital was 90's. His metoprolol was increased to 50 mg twice daily today by his PCP. States he's also taking atenolol -- states also advised by PCP (?). Compliant with DAPT.         Review of Systems:     12-pt ROS is negative except for as noted in the HPI.          Physical Exam:     Vitals: BP (!) 144/76 (BP Location: Left arm, Cuff Size: Adult Regular)   Pulse 115   Ht 1.676 m (5' 6\")   Wt 83.9 kg (185 lb)   SpO2 100%   BMI 29.86 kg/m    Wt Readings from Last 10 Encounters:   11/03/21 83.9 kg (185 lb)   10/22/21 82.5 kg (181 lb 14.4 oz)   09/12/21 86.2 kg (190 lb)   11/27/20 86.1 kg (189 lb 13.1 oz)   12/26/11 83.9 kg (184 lb 14.4 oz)   05/03/09 82.1 kg (181 lb)       Constitutional:  Patient is pleasant, alert, cooperative, and in NAD.  HEENT:  NCAT. PERRLA. EOM's intact.   Neck:  CVP appears normal. No carotid bruits.   Pulmonary: Normal respiratory effort. CTAB.   Cardiac: RRR, normal S1/S2, no S3/S4, no murmur or rub.   Abdomen:  Non-tender abdomen, no hepatosplenomegaly appreciated.   Vascular: Pulses in the upper and lower extremities are 2+ and equal bilaterally.  Extremities: No edema, erythema, cyanosis or tenderness appreciated.  Skin:  No rashes or lesions appreciated.   Neurological:  No gross motor or sensory deficits.   Psych: Appropriate affect.        Data:     Labs reviewed:  Recent Labs   Lab Test 10/21/21  0553 10/20/21  1519   LDL 39  --    HDL 40  --    NHDL 51  --    CHOL 91  --    TRIG 58  --    TSH  --  3.05       Lab Results   Component Value Date    WBC 11.0 10/22/2021    WBC 27.9 (H) 11/27/2020    RBC 4.68 10/22/2021    RBC 4.24 (L) 11/27/2020    HGB 10.8 (L) 10/22/2021    HGB 10.1 (L) 11/27/2020    HCT 34.9 (L) " 10/22/2021    HCT 31.5 (L) 11/27/2020    MCV 75 (L) 10/22/2021    MCV 74 (L) 11/27/2020    MCH 23.1 (L) 10/22/2021    MCH 23.8 (L) 11/27/2020    MCHC 30.9 (L) 10/22/2021    MCHC 32.1 11/27/2020    RDW 17.1 (H) 10/22/2021    RDW 16.5 (H) 11/27/2020     10/22/2021     (H) 11/27/2020       Lab Results   Component Value Date     10/22/2021     11/27/2020    POTASSIUM 3.5 10/22/2021    POTASSIUM 3.3 (L) 11/27/2020    CHLORIDE 107 10/22/2021    CHLORIDE 96 11/27/2020    CO2 26 10/22/2021    CO2 21 11/27/2020    ANIONGAP 6 10/22/2021    ANIONGAP 17 (H) 11/27/2020     (H) 10/22/2021     (H) 10/22/2021     (H) 11/27/2020    BUN 16 10/22/2021    BUN 37 (H) 11/27/2020    CR 0.96 10/22/2021    CR 1.34 (H) 11/27/2020    GFRESTIMATED 75 10/22/2021    GFRESTIMATED 50 (L) 11/27/2020    GFRESTBLACK 58 (L) 11/27/2020    MÓNICA 8.3 (L) 10/22/2021    MÓNICA 8.3 (L) 11/27/2020      Lab Results   Component Value Date     (H) 11/27/2020     (H) 11/27/2020       Lab Results   Component Value Date    A1C 7.9 (H) 10/20/2021       Lab Results   Component Value Date    INR 0.91 05/11/2009           Problem List:     Patient Active Problem List   Diagnosis     NSTEMI (non-ST elevated myocardial infarction) (H)     Chest pain, unspecified type           Medications:     Current Outpatient Medications   Medication Sig Dispense Refill     aspirin (ASA) 81 MG EC tablet Take 1 tablet (81 mg) by mouth daily Start tomorrow. 30 tablet 3     atenolol (TENORMIN) 25 MG tablet Take 25 mg by mouth daily Takes 1/2 tablet at bedtime       atorvastatin (LIPITOR) 40 MG tablet Take 40 mg by mouth daily       chlorthalidone (HYGROTON) 25 MG tablet Take 1 tablet by mouth daily       cloNIDine (CATAPRES) 0.1 MG tablet Take 0.1 mg by mouth 2 times daily       glipiZIDE (GLUCOTROL XL) 2.5 MG 24 hr tablet Take 5 mg by mouth daily (with lunch)       irbesartan (AVAPRO) 300 MG tablet Take 300 mg by mouth daily        metFORMIN (GLUCOPHAGE-XR) 500 MG 24 hr tablet Take 1,000 mg by mouth 2 times daily (with meals)       metoprolol succinate ER (TOPROL-XL) 50 MG 24 hr tablet Take 50 mg by mouth daily       nitroGLYcerin (NITROSTAT) 0.3 MG sublingual tablet For chest pain place 1 tablet under the tongue every 5 minutes for 3 doses. If symptoms persist 5 minutes after 1st dose call 911. 10 tablet 0     ticagrelor (BRILINTA) 90 MG tablet Take 1 tablet (90 mg) by mouth every 12 hours 60 tablet 11     Vitamin D3 (CHOLECALCIFEROL) 25 mcg (1000 units) tablet Take 1 tablet by mouth daily             Past Medical History:     Past Medical History:   Diagnosis Date     Diabetes (H)      Hypertension      Past Surgical History:   Procedure Laterality Date     CV CORONARY ANGIOGRAM N/A 10/21/2021    Procedure: Coronary Angiogram;  Surgeon: Fuad Sexton MD;  Location: [facility] CARDIAC CATH LAB     CV FEMORAL ANGIOGRAM N/A 10/21/2021    Procedure: Femoral Angiogram;  Surgeon: Fuad Sexton MD;  Location: [facility] CARDIAC CATH LAB     CV PCI ANGIOPLASTY N/A 10/21/2021    Procedure: Percutaneous Transluminal Angioplasty;  Surgeon: Fuad Sexton MD;  Location: [facility] CARDIAC CATH LAB     History reviewed. No pertinent family history.  Social History     Socioeconomic History     Marital status:      Spouse name: Not on file     Number of children: Not on file     Years of education: Not on file     Highest education level: Not on file   Occupational History     Not on file   Tobacco Use     Smoking status: Former Smoker     Quit date: 5/3/1979     Years since quittin.5     Smokeless tobacco: Never Used   Substance and Sexual Activity     Alcohol use: Not Currently     Drug use: Never     Sexual activity: Not Currently   Other Topics Concern     Not on file   Social History Narrative     Not on file     Social Determinants of Health     Financial Resource Strain:      Difficulty of Paying Living Expenses:    Food  Insecurity:      Worried About Running Out of Food in the Last Year:      Ran Out of Food in the Last Year:    Transportation Needs:      Lack of Transportation (Medical):      Lack of Transportation (Non-Medical):    Physical Activity:      Days of Exercise per Week:      Minutes of Exercise per Session:    Stress:      Feeling of Stress :    Social Connections:      Frequency of Communication with Friends and Family:      Frequency of Social Gatherings with Friends and Family:      Attends Episcopal Services:      Active Member of Clubs or Organizations:      Attends Club or Organization Meetings:      Marital Status:    Intimate Partner Violence:      Fear of Current or Ex-Partner:      Emotionally Abused:      Physically Abused:      Sexually Abused:            Allergies:   Zaira-seltzer and Prinivil [lisinopril]      Norah Wick PA-C  [facility]  Pager: [phone]

## 2021-11-03 NOTE — PROGRESS NOTES
Cardiology Clinic Progress Note    Kostas Begum MRN# [id number]   YOB: 1940 Age: 80 year old   Primary cardiologist: Dr. Otero         Assessment and Plan:     In summary, Kostas Begum presents today for a hospital follow up visit.     1.  CAD, NSTEMI, s/p PCI to 99% OM1 lesion on October 21, 2021 with Dr. Sexton.  Residual 95% mid LAD and 70% distal LAD lesions noted with plans for outpatient staged PCI. On DAPT with Brilinta.   2.  Atypical chest pain, suspect GERD.  3.  Preserved LVEF of 55-60%.  4.  DMII. a1c 7.9%. Managed by PCP, on metformin.  5.  HTN, mildly uncontrolled.   6.  Sinus tachycardia. Improved with reduction in chlorthalidone, but persistent. Notes anxiety.   7.  Mild anemia, Hgb ~11.    Plan:  - Stop atenolol. Metoprolol was just increased by his PCP today.   - CBC today, to follow up on mild anemia. His renal function is normal.   - Encouraged to try SL NTG for resting chest pain, if it recurs.   - Discussed importance of compliance with DAPT for >/= 1 year.  - Plan for staged PCI end of November with Dr. Sexton.    Risks and benefits of left heart catheterization and coronary angiogram were discussed with the patient in detail. 0.1-0.3% (for diagnostic angio) and 1-2% (for PCI)  risk of stroke, MI, death, emergent bypass for diagnostic angio, risk of contrast induced allergic reaction, renal dysfunction, vascular complications were discussed. Patient understands and wishes to proceed.  - Return to see Dr. Otero in 6 months.     Addendum, 11/22: CBC returned with elevated wt ct of 13 and stable Hgb ~11. Pt described no infectious symptoms, however given his tachycardia I directed Son to be seen by his PCP that week. PCP directed him to the ED, where white ct was up to 16.7, and blood cultures were negative. He saw PCP after this, who referred him to heme/onc for leukocytosis and thalassemia, but cleared him for PCI as scheduled (unable to see full note). Wt ct has since come  "down to 14. I discussed this with Dr. Otero, who agrees that patient may proceed with PCI as planned.         History of Presenting Illness:      Son Shy is a pleasant 80 year old patient who presents today for a hospital follow up visit, with aid of a [place]  over the phone.    The patient has a history of the following -   1.  CAD, NSTEMI, s/p PCI to 99% OM1 lesion on October 21, 2021 with Dr. Sexton.  Residual 95% mid LAD and 70% distal LAD lesions noted with plans for outpatient staged PCI. On DAPT with Brilinta.   2.  Preserved LVEF of 55-60%.  3.  DMII  4.  HTN  5.  HLP  6. GERD    In brief, we met Son when admitted with chest pain last month. Serial trop peaked at 0.35. TTE showed EF 55-60% with mild hypokinesis of mid anteroseptal and apical septal walls.  He underwent angiogram 10/21 with finding of 99% stenosis of OM1 which was stented successfully. Residual 95% lesion in the mid LAD and 70% lesion in the distal LAD with plan for staged PCI in a month or so.  Lipid panel was well-controlled with an LDL 39, HLD 40 on Lipitor.  He was placed on DAPT with Brilinta.     Today, I am meeting Son for the first time in clinic. He mentions that he can occasionally feel a burning sensation in his chest \"sometimes,\" usually when he lies down, usually, but not always after eating. No other associated symptoms. Hasn't tried SL NTG for this, but wonders if he should. He otherwise has not noticed any chest discomfort. Patient denies shortness of breath, PND, orthopnea, edema, claudication, palpitations, near syncope or syncope.  He has been participating in cardiac rehab.  I received a message from them last week with concerns over elevated heart rate in the 120s, which was likely in the setting of mild dehydration, and improved after his chlorthalidone dose was reduced. HR is still a little high in clinic today, as is BP on his ambulatory monitoring and in rehab. States he is anxious for our visit. " "HR in the hospital was 90's. His metoprolol was increased to 50 mg twice daily today by his PCP. States he's also taking atenolol -- states also advised by PCP (?). Compliant with DAPT.         Review of Systems:     12-pt ROS is negative except for as noted in the HPI.          Physical Exam:     Vitals: BP (!) 144/76 (BP Location: Left arm, Cuff Size: Adult Regular)   Pulse 115   Ht 1.676 m (5' 6\")   Wt 83.9 kg (185 lb)   SpO2 100%   BMI 29.86 kg/m    Wt Readings from Last 10 Encounters:   11/03/21 83.9 kg (185 lb)   10/22/21 82.5 kg (181 lb 14.4 oz)   09/12/21 86.2 kg (190 lb)   11/27/20 86.1 kg (189 lb 13.1 oz)   12/26/11 83.9 kg (184 lb 14.4 oz)   05/03/09 82.1 kg (181 lb)       Constitutional:  Patient is pleasant, alert, cooperative, and in NAD.  HEENT:  NCAT. PERRLA. EOM's intact.   Neck:  CVP appears normal. No carotid bruits.   Pulmonary: Normal respiratory effort. CTAB.   Cardiac: RRR, normal S1/S2, no S3/S4, no murmur or rub.   Abdomen:  Non-tender abdomen, no hepatosplenomegaly appreciated.   Vascular: Pulses in the upper and lower extremities are 2+ and equal bilaterally.  Extremities: No edema, erythema, cyanosis or tenderness appreciated.  Skin:  No rashes or lesions appreciated.   Neurological:  No gross motor or sensory deficits.   Psych: Appropriate affect.        Data:     Labs reviewed:  Recent Labs   Lab Test 10/21/21  0553 10/20/21  1519   LDL 39  --    HDL 40  --    NHDL 51  --    CHOL 91  --    TRIG 58  --    TSH  --  3.05       Lab Results   Component Value Date    WBC 11.0 10/22/2021    WBC 27.9 (H) 11/27/2020    RBC 4.68 10/22/2021    RBC 4.24 (L) 11/27/2020    HGB 10.8 (L) 10/22/2021    HGB 10.1 (L) 11/27/2020    HCT 34.9 (L) 10/22/2021    HCT 31.5 (L) 11/27/2020    MCV 75 (L) 10/22/2021    MCV 74 (L) 11/27/2020    MCH 23.1 (L) 10/22/2021    MCH 23.8 (L) 11/27/2020    MCHC 30.9 (L) 10/22/2021    MCHC 32.1 11/27/2020    RDW 17.1 (H) 10/22/2021    RDW 16.5 (H) 11/27/2020     " 10/22/2021     (H) 11/27/2020       Lab Results   Component Value Date     10/22/2021     11/27/2020    POTASSIUM 3.5 10/22/2021    POTASSIUM 3.3 (L) 11/27/2020    CHLORIDE 107 10/22/2021    CHLORIDE 96 11/27/2020    CO2 26 10/22/2021    CO2 21 11/27/2020    ANIONGAP 6 10/22/2021    ANIONGAP 17 (H) 11/27/2020     (H) 10/22/2021     (H) 10/22/2021     (H) 11/27/2020    BUN 16 10/22/2021    BUN 37 (H) 11/27/2020    CR 0.96 10/22/2021    CR 1.34 (H) 11/27/2020    GFRESTIMATED 75 10/22/2021    GFRESTIMATED 50 (L) 11/27/2020    GFRESTBLACK 58 (L) 11/27/2020    MÓNICA 8.3 (L) 10/22/2021    MÓNICA 8.3 (L) 11/27/2020      Lab Results   Component Value Date     (H) 11/27/2020     (H) 11/27/2020       Lab Results   Component Value Date    A1C 7.9 (H) 10/20/2021       Lab Results   Component Value Date    INR 0.91 05/11/2009           Problem List:     Patient Active Problem List   Diagnosis     NSTEMI (non-ST elevated myocardial infarction) (H)     Chest pain, unspecified type           Medications:     Current Outpatient Medications   Medication Sig Dispense Refill     aspirin (ASA) 81 MG EC tablet Take 1 tablet (81 mg) by mouth daily Start tomorrow. 30 tablet 3     atenolol (TENORMIN) 25 MG tablet Take 25 mg by mouth daily Takes 1/2 tablet at bedtime       atorvastatin (LIPITOR) 40 MG tablet Take 40 mg by mouth daily       chlorthalidone (HYGROTON) 25 MG tablet Take 1 tablet by mouth daily       cloNIDine (CATAPRES) 0.1 MG tablet Take 0.1 mg by mouth 2 times daily       glipiZIDE (GLUCOTROL XL) 2.5 MG 24 hr tablet Take 5 mg by mouth daily (with lunch)       irbesartan (AVAPRO) 300 MG tablet Take 300 mg by mouth daily       metFORMIN (GLUCOPHAGE-XR) 500 MG 24 hr tablet Take 1,000 mg by mouth 2 times daily (with meals)       metoprolol succinate ER (TOPROL-XL) 50 MG 24 hr tablet Take 50 mg by mouth daily       nitroGLYcerin (NITROSTAT) 0.3 MG sublingual tablet For chest pain  place 1 tablet under the tongue every 5 minutes for 3 doses. If symptoms persist 5 minutes after 1st dose call 911. 10 tablet 0     ticagrelor (BRILINTA) 90 MG tablet Take 1 tablet (90 mg) by mouth every 12 hours 60 tablet 11     Vitamin D3 (CHOLECALCIFEROL) 25 mcg (1000 units) tablet Take 1 tablet by mouth daily             Past Medical History:     Past Medical History:   Diagnosis Date     Diabetes (H)      Hypertension      Past Surgical History:   Procedure Laterality Date     CV CORONARY ANGIOGRAM N/A 10/21/2021    Procedure: Coronary Angiogram;  Surgeon: Fuad Sexton MD;  Location: [facility] CARDIAC CATH LAB     CV FEMORAL ANGIOGRAM N/A 10/21/2021    Procedure: Femoral Angiogram;  Surgeon: Fuad Sexton MD;  Location: [facility] CARDIAC CATH LAB     CV PCI ANGIOPLASTY N/A 10/21/2021    Procedure: Percutaneous Transluminal Angioplasty;  Surgeon: Fuad Sexton MD;  Location: [facility] CARDIAC CATH LAB     History reviewed. No pertinent family history.  Social History     Socioeconomic History     Marital status:      Spouse name: Not on file     Number of children: Not on file     Years of education: Not on file     Highest education level: Not on file   Occupational History     Not on file   Tobacco Use     Smoking status: Former Smoker     Quit date: 5/3/1979     Years since quittin.5     Smokeless tobacco: Never Used   Substance and Sexual Activity     Alcohol use: Not Currently     Drug use: Never     Sexual activity: Not Currently   Other Topics Concern     Not on file   Social History Narrative     Not on file     Social Determinants of Health     Financial Resource Strain:      Difficulty of Paying Living Expenses:    Food Insecurity:      Worried About Running Out of Food in the Last Year:      Ran Out of Food in the Last Year:    Transportation Needs:      Lack of Transportation (Medical):      Lack of Transportation (Non-Medical):    Physical Activity:      Days of Exercise per  Week:      Minutes of Exercise per Session:    Stress:      Feeling of Stress :    Social Connections:      Frequency of Communication with Friends and Family:      Frequency of Social Gatherings with Friends and Family:      Attends Mosque Services:      Active Member of Clubs or Organizations:      Attends Club or Organization Meetings:      Marital Status:    Intimate Partner Violence:      Fear of Current or Ex-Partner:      Emotionally Abused:      Physically Abused:      Sexually Abused:            Allergies:   Zaira-seltzer and Prinivil [lisinopril]      ELDA Dias [facility]  Pager: [phone]

## 2021-11-03 NOTE — PATIENT INSTRUCTIONS
Today's Plan:   - Remember, aspirin (once daily) and Brilinta (every 12 hours) are most important for keeping your stent(s) open. Do not miss a dose! The Brilinta is at least 1 year, and aspirin is lifelong.   - Cardiac rehab will help to get you back to your functional baseline.   -Return for intervention on your residual blockage at the end of this month or beginning of next month, with Dr. Sexton. You will need to HOLD metformin, glipizide and chlorthalidone the day of the procedure.  - Return to see me after that, with a blood draw for the kidney function prior.   - Return to see your cardiologist Dr. Otero in 6 months.     If you have questions or concerns please call my nurse team at [phone].   Scheduling phone number: [phone]  For after hours urgent concerns call [phone] option 2.   Reminder: Please bring in all current medications, over the counter supplements and vitamin bottles to your next appointment.    It was a pleasure seeing you today!     Norah Wick PA-C

## 2021-11-04 ENCOUNTER — HOSPITAL ENCOUNTER (OUTPATIENT)
Dept: CARDIAC REHAB | Facility: CLINIC | Age: 81
End: 2021-11-04
Attending: STUDENT IN AN ORGANIZED HEALTH CARE EDUCATION/TRAINING PROGRAM
Payer: COMMERCIAL

## 2021-11-04 DIAGNOSIS — R07.9 CHEST PAIN: Primary | ICD-10-CM

## 2021-11-04 PROCEDURE — 93798 PHYS/QHP OP CAR RHAB W/ECG: CPT | Performed by: OCCUPATIONAL THERAPIST

## 2021-11-05 ENCOUNTER — APPOINTMENT (OUTPATIENT)
Dept: GENERAL RADIOLOGY | Facility: CLINIC | Age: 81
End: 2021-11-05
Attending: EMERGENCY MEDICINE
Payer: COMMERCIAL

## 2021-11-05 ENCOUNTER — OFFICE VISIT (OUTPATIENT)
Dept: URGENT CARE | Facility: URGENT CARE | Age: 81
End: 2021-11-05
Payer: COMMERCIAL

## 2021-11-05 ENCOUNTER — TELEPHONE (OUTPATIENT)
Dept: CARDIOLOGY | Facility: CLINIC | Age: 81
End: 2021-11-05

## 2021-11-05 ENCOUNTER — HOSPITAL ENCOUNTER (OUTPATIENT)
Dept: CARDIAC REHAB | Facility: CLINIC | Age: 81
End: 2021-11-05
Attending: STUDENT IN AN ORGANIZED HEALTH CARE EDUCATION/TRAINING PROGRAM
Payer: COMMERCIAL

## 2021-11-05 ENCOUNTER — HOSPITAL ENCOUNTER (EMERGENCY)
Facility: CLINIC | Age: 81
Discharge: HOME | End: 2021-11-05
Attending: EMERGENCY MEDICINE | Admitting: EMERGENCY MEDICINE
Payer: COMMERCIAL

## 2021-11-05 VITALS
BODY MASS INDEX: 30.09 KG/M2 | SYSTOLIC BLOOD PRESSURE: 138 MMHG | OXYGEN SATURATION: 100 % | DIASTOLIC BLOOD PRESSURE: 72 MMHG | WEIGHT: 186.4 LBS | HEART RATE: 102 BPM | TEMPERATURE: 97.1 F

## 2021-11-05 VITALS
SYSTOLIC BLOOD PRESSURE: 141 MMHG | HEART RATE: 87 BPM | BODY MASS INDEX: 29.89 KG/M2 | OXYGEN SATURATION: 100 % | WEIGHT: 186 LBS | TEMPERATURE: 98.3 F | RESPIRATION RATE: 16 BRPM | HEIGHT: 66 IN | DIASTOLIC BLOOD PRESSURE: 81 MMHG

## 2021-11-05 DIAGNOSIS — D64.9 ANEMIA, UNSPECIFIED TYPE: ICD-10-CM

## 2021-11-05 DIAGNOSIS — Z95.5 HISTORY OF CORONARY ARTERY STENT PLACEMENT: ICD-10-CM

## 2021-11-05 DIAGNOSIS — E11.9 TYPE 2 DIABETES MELLITUS WITHOUT COMPLICATION, WITHOUT LONG-TERM CURRENT USE OF INSULIN (H): Primary | ICD-10-CM

## 2021-11-05 DIAGNOSIS — D72.829 LEUKOCYTOSIS, UNSPECIFIED TYPE: ICD-10-CM

## 2021-11-05 DIAGNOSIS — Z98.890 STATUS POST CORONARY ANGIOGRAM: ICD-10-CM

## 2021-11-05 DIAGNOSIS — R07.89 DISCOMFORT IN CHEST: ICD-10-CM

## 2021-11-05 LAB
ACANTHOCYTES BLD QL SMEAR: SLIGHT
ACANTHOCYTES BLD QL SMEAR: SLIGHT
ALBUMIN SERPL-MCNC: 3 G/DL (ref 3.4–5)
ALBUMIN UR-MCNC: NEGATIVE MG/DL
ALP SERPL-CCNC: 137 U/L (ref 40–150)
ALT SERPL W P-5'-P-CCNC: 41 U/L (ref 0–70)
ANION GAP SERPL CALCULATED.3IONS-SCNC: 7 MMOL/L (ref 3–14)
APPEARANCE UR: CLEAR
AST SERPL W P-5'-P-CCNC: 22 U/L (ref 0–45)
BACTERIA #/AREA URNS HPF: ABNORMAL /HPF
BASOPHILS # BLD MANUAL: 0 10E3/UL (ref 0–0.2)
BASOPHILS # BLD MANUAL: 0.2 10E3/UL (ref 0–0.2)
BASOPHILS NFR BLD MANUAL: 0 %
BASOPHILS NFR BLD MANUAL: 1 %
BILIRUB SERPL-MCNC: 0.3 MG/DL (ref 0.2–1.3)
BILIRUB UR QL STRIP: NEGATIVE
BUN SERPL-MCNC: 28 MG/DL (ref 7–30)
CALCIUM SERPL-MCNC: 8.5 MG/DL (ref 8.5–10.1)
CHLORIDE BLD-SCNC: 102 MMOL/L (ref 94–109)
CO2 SERPL-SCNC: 22 MMOL/L (ref 20–32)
COLOR UR AUTO: YELLOW
CREAT SERPL-MCNC: 0.96 MG/DL (ref 0.66–1.25)
ELLIPTOCYTES BLD QL SMEAR: ABNORMAL
ELLIPTOCYTES BLD QL SMEAR: ABNORMAL
EOSINOPHIL # BLD MANUAL: 0.3 10E3/UL (ref 0–0.7)
EOSINOPHIL # BLD MANUAL: 0.5 10E3/UL (ref 0–0.7)
EOSINOPHIL NFR BLD MANUAL: 2 %
EOSINOPHIL NFR BLD MANUAL: 3 %
ERYTHROCYTE [DISTWIDTH] IN BLOOD BY AUTOMATED COUNT: 16.1 % (ref 10–15)
ERYTHROCYTE [DISTWIDTH] IN BLOOD BY AUTOMATED COUNT: 16.4 % (ref 10–15)
FRAGMENTS BLD QL SMEAR: ABNORMAL
FRAGMENTS BLD QL SMEAR: ABNORMAL
GFR SERPL CREATININE-BSD FRML MDRD: 75 ML/MIN/1.73M2
GLUCOSE BLD-MCNC: 113 MG/DL (ref 70–99)
GLUCOSE UR STRIP-MCNC: 250 MG/DL
HCT VFR BLD AUTO: 34.4 % (ref 40–53)
HCT VFR BLD AUTO: 36.5 % (ref 40–53)
HGB BLD-MCNC: 11 G/DL (ref 13.3–17.7)
HGB BLD-MCNC: 11.5 G/DL (ref 13.3–17.7)
HGB UR QL STRIP: NEGATIVE
HOLD SPECIMEN: NORMAL
KETONES UR STRIP-MCNC: NEGATIVE MG/DL
LEUKOCYTE ESTERASE UR QL STRIP: NEGATIVE
LYMPHOCYTES # BLD MANUAL: 8 10E3/UL (ref 0.8–5.3)
LYMPHOCYTES # BLD MANUAL: 9 10E3/UL (ref 0.8–5.3)
LYMPHOCYTES NFR BLD MANUAL: 48 %
LYMPHOCYTES NFR BLD MANUAL: 54 %
MCH RBC QN AUTO: 23.2 PG (ref 26.5–33)
MCH RBC QN AUTO: 23.5 PG (ref 26.5–33)
MCHC RBC AUTO-ENTMCNC: 31.5 G/DL (ref 31.5–36.5)
MCHC RBC AUTO-ENTMCNC: 32 G/DL (ref 31.5–36.5)
MCV RBC AUTO: 73 FL (ref 78–100)
MCV RBC AUTO: 75 FL (ref 78–100)
METAMYELOCYTES # BLD MANUAL: 0.3 10E3/UL
METAMYELOCYTES NFR BLD MANUAL: 2 %
MONOCYTES # BLD MANUAL: 0.3 10E3/UL (ref 0–1.3)
MONOCYTES # BLD MANUAL: 1 10E3/UL (ref 0–1.3)
MONOCYTES NFR BLD MANUAL: 2 %
MONOCYTES NFR BLD MANUAL: 6 %
NEUTROPHILS # BLD MANUAL: 6.8 10E3/UL (ref 1.6–8.3)
NEUTROPHILS # BLD MANUAL: 6.8 10E3/UL (ref 1.6–8.3)
NEUTROPHILS NFR BLD MANUAL: 41 %
NEUTROPHILS NFR BLD MANUAL: 41 %
NITRATE UR QL: NEGATIVE
PH UR STRIP: 5.5 [PH] (ref 5–7)
PLAT MORPH BLD: ABNORMAL
PLAT MORPH BLD: ABNORMAL
PLATELET # BLD AUTO: 621 10E3/UL (ref 150–450)
PLATELET # BLD AUTO: 627 10E3/UL (ref 150–450)
POTASSIUM BLD-SCNC: 4 MMOL/L (ref 3.4–5.3)
PROT SERPL-MCNC: 6.7 G/DL (ref 6.8–8.8)
RBC # BLD AUTO: 4.74 10E6/UL (ref 4.4–5.9)
RBC # BLD AUTO: 4.89 10E6/UL (ref 4.4–5.9)
RBC #/AREA URNS AUTO: ABNORMAL /HPF
RBC MORPH BLD: ABNORMAL
RBC MORPH BLD: ABNORMAL
SARS-COV-2 RNA RESP QL NAA+PROBE: NEGATIVE
SMUDGE CELLS BLD QL SMEAR: PRESENT
SMUDGE CELLS BLD QL SMEAR: PRESENT
SODIUM SERPL-SCNC: 131 MMOL/L (ref 133–144)
SP GR UR STRIP: 1.02 (ref 1–1.03)
SQUAMOUS #/AREA URNS AUTO: ABNORMAL /LPF
TROPONIN I SERPL-MCNC: <0.015 UG/L (ref 0–0.04)
TROPONIN I SERPL-MCNC: <0.015 UG/L (ref 0–0.04)
UROBILINOGEN UR STRIP-ACNC: 0.2 E.U./DL
WBC # BLD AUTO: 16.7 10E3/UL (ref 4–11)
WBC # BLD AUTO: 16.7 10E3/UL (ref 4–11)
WBC #/AREA URNS AUTO: ABNORMAL /HPF

## 2021-11-05 PROCEDURE — 87040 BLOOD CULTURE FOR BACTERIA: CPT | Performed by: EMERGENCY MEDICINE

## 2021-11-05 PROCEDURE — 258N000003 HC RX IP 258 OP 636: Performed by: EMERGENCY MEDICINE

## 2021-11-05 PROCEDURE — U0005 INFEC AGEN DETEC AMPLI PROBE: HCPCS | Performed by: PHYSICIAN ASSISTANT

## 2021-11-05 PROCEDURE — 85027 COMPLETE CBC AUTOMATED: CPT | Performed by: EMERGENCY MEDICINE

## 2021-11-05 PROCEDURE — 71046 X-RAY EXAM CHEST 2 VIEWS: CPT

## 2021-11-05 PROCEDURE — 84484 ASSAY OF TROPONIN QUANT: CPT | Performed by: PHYSICIAN ASSISTANT

## 2021-11-05 PROCEDURE — 96360 HYDRATION IV INFUSION INIT: CPT | Mod: 59

## 2021-11-05 PROCEDURE — 36415 COLL VENOUS BLD VENIPUNCTURE: CPT | Performed by: EMERGENCY MEDICINE

## 2021-11-05 PROCEDURE — 99215 OFFICE O/P EST HI 40 MIN: CPT | Performed by: PHYSICIAN ASSISTANT

## 2021-11-05 PROCEDURE — 84484 ASSAY OF TROPONIN QUANT: CPT | Performed by: EMERGENCY MEDICINE

## 2021-11-05 PROCEDURE — 81001 URINALYSIS AUTO W/SCOPE: CPT | Performed by: PHYSICIAN ASSISTANT

## 2021-11-05 PROCEDURE — 93798 PHYS/QHP OP CAR RHAB W/ECG: CPT | Performed by: REHABILITATION PRACTITIONER

## 2021-11-05 PROCEDURE — 85027 COMPLETE CBC AUTOMATED: CPT | Performed by: PHYSICIAN ASSISTANT

## 2021-11-05 PROCEDURE — 36415 COLL VENOUS BLD VENIPUNCTURE: CPT | Performed by: PHYSICIAN ASSISTANT

## 2021-11-05 PROCEDURE — U0003 INFECTIOUS AGENT DETECTION BY NUCLEIC ACID (DNA OR RNA); SEVERE ACUTE RESPIRATORY SYNDROME CORONAVIRUS 2 (SARS-COV-2) (CORONAVIRUS DISEASE [COVID-19]), AMPLIFIED PROBE TECHNIQUE, MAKING USE OF HIGH THROUGHPUT TECHNOLOGIES AS DESCRIBED BY CMS-2020-01-R: HCPCS | Performed by: PHYSICIAN ASSISTANT

## 2021-11-05 PROCEDURE — 93005 ELECTROCARDIOGRAM TRACING: CPT

## 2021-11-05 PROCEDURE — 99285 EMERGENCY DEPT VISIT HI MDM: CPT | Mod: 25

## 2021-11-05 PROCEDURE — 87040 BLOOD CULTURE FOR BACTERIA: CPT | Performed by: PHYSICIAN ASSISTANT

## 2021-11-05 PROCEDURE — 80053 COMPREHEN METABOLIC PANEL: CPT | Performed by: EMERGENCY MEDICINE

## 2021-11-05 RX ADMIN — SODIUM CHLORIDE 500 ML: 9 INJECTION, SOLUTION INTRAVENOUS at 22:31

## 2021-11-05 ASSESSMENT — MIFFLIN-ST. JEOR: SCORE: 1506.44

## 2021-11-05 NOTE — PROGRESS NOTES
"  Assessment & Plan     Leukocytosis, unspecified type  CBC is even more elevated than before  Concern as patient just had an angiogram and stents were placed  Cardiologist wanted them to be check for infection  Patient was referred to the ED for further evaluation of possible infection in stents  - CBC with platelets and differential; Future  - UA with Microscopic reflex to Culture  - Blood Culture Line, venous; Future  - CBC with platelets and differential  - Blood Culture Peripheral Blood  - Urine Microscopic  - Comprehensive metabolic panel (BMP + Alb, Alk Phos, ALT, AST, Total. Bili, TP)    Discomfort in chest  Troponin negative for cardiac damage  covid pending  - Troponin I; Future  - Symptomatic COVID-19 Virus (Coronavirus) by PCR Nose  - Troponin I    Status post coronary angiogram      History of coronary artery stent placement      Type 2 diabetes mellitus without complication, without long-term current use of insulin (H)  Increased risk of infection      Review of external notes as documented elsewhere in note  60 minutes spent on the date of the encounter doing chart review, review of outside records, review of test results, interpretation of tests, patient visit, documentation and discussion with other provider(s)        BMI:   Estimated body mass index is 30.09 kg/m  as calculated from the following:    Height as of 11/3/21: 1.676 m (5' 6\").    Weight as of this encounter: 84.6 kg (186 lb 6.4 oz).       CONSULTATION/REFERRAL to ED    No follow-ups on file.    Tha English PA-C  [facility]    Subjective   Son is a 80 year old who presents for the following health issues  accompanied by his daughter.    HPI   Abnormal blood work  Status post MI   Status post angiogram  Status post stent placement    Review of Systems   Constitutional, HEENT, cardiovascular, pulmonary, gi and gu systems are negative, except as otherwise noted.      Objective    /72 (BP Location: Right " arm, Patient Position: Sitting, Cuff Size: Adult Regular)   Pulse 102   Temp 97.1  F (36.2  C) (Oral)   Wt 84.6 kg (186 lb 6.4 oz)   SpO2 100%   BMI 30.09 kg/m    Body mass index is 30.09 kg/m .  Physical Exam   GENERAL: healthy, alert and no distress  NECK: no adenopathy, no asymmetry, masses, or scars and thyroid normal to palpation  RESP: lungs clear to auscultation - no rales, rhonchi or wheezes  CV: regular rate and rhythm, normal S1 S2, no S3 or S4, no murmur, click or rub, no peripheral edema and peripheral pulses strong  MS: no gross musculoskeletal defects noted, no edema  SKIN: no suspicious lesions or rashes    Results for orders placed or performed in visit on 11/05/21   UA with Microscopic reflex to Culture     Status: Abnormal    Specimen: Urine, Midstream   Result Value Ref Range    Color Urine Yellow Colorless, Straw, Light Yellow, Yellow    Appearance Urine Clear Clear    Glucose Urine 250  (A) Negative mg/dL    Bilirubin Urine Negative Negative    Ketones Urine Negative Negative mg/dL    Specific Gravity Urine 1.025 1.003 - 1.035    Blood Urine Negative Negative    pH Urine 5.5 5.0 - 7.0    Protein Albumin Urine Negative Negative mg/dL    Urobilinogen Urine 0.2 0.2, 1.0 E.U./dL    Nitrite Urine Negative Negative    Leukocyte Esterase Urine Negative Negative   Troponin I     Status: Normal   Result Value Ref Range    Troponin I <0.015 0.000 - 0.045 ug/L   CBC with platelets and differential     Status: Abnormal (Preliminary result)   Result Value Ref Range    WBC Count 16.7 (H) 4.0 - 11.0 10e3/uL    RBC Count 4.89 4.40 - 5.90 10e6/uL    Hemoglobin 11.5 (L) 13.3 - 17.7 g/dL    Hematocrit 36.5 (L) 40.0 - 53.0 %    MCV 75 (L) 78 - 100 fL    MCH 23.5 (L) 26.5 - 33.0 pg    MCHC 31.5 31.5 - 36.5 g/dL    RDW 16.4 (H) 10.0 - 15.0 %    Platelet Count 627 (H) 150 - 450 10e3/uL   Urine Microscopic     Status: Abnormal   Result Value Ref Range    Bacteria Urine None Seen None Seen /HPF    RBC Urine 0-2 0-2  /HPF /HPF    WBC Urine 0-5 0-5 /HPF /HPF    Squamous Epithelials Urine Few (A) None Seen /LPF    Narrative    Urine Culture not indicated   CBC with platelets and differential     Status: Abnormal (In process)    Narrative    The following orders were created for panel order CBC with platelets and differential.  Procedure                               Abnormality         Status                     ---------                               -----------         ------                     CBC with platelets and d...[[id number]]  Abnormal            Preliminary result           Please view results for these tests on the individual orders.

## 2021-11-05 NOTE — TELEPHONE ENCOUNTER
----- Message from Norah Wick PA-C sent at 11/4/2021  8:58 PM CDT -----  Please let Son know that his hemoglobin is stable, so not the cause for his elevated heart rate. However, his white count is elevated, which may indicate infection. Please ask him to review this with his PCP. Thank you!      Called patient's granddaughter, Maryse, and reviewed message as above from Norah. Maryse verbalized understanding, will call PCP today.       Addendum: maryse called back, said PCP clinic would not schedule an appointment and told the patient to go to the ED. Maryse did not think this was necessary and wanted to confirm with Norah Wick. Spoke to Norah who agreed that ED was not indicated. Patient/granddaughter will go to Urgent Care instead.

## 2021-11-06 ENCOUNTER — HOSPITAL ENCOUNTER (EMERGENCY)
Facility: CLINIC | Age: 81
Discharge: STILL ADMITTED OR ADMINISTRATIVE | End: 2021-11-07
Payer: COMMERCIAL

## 2021-11-06 LAB
ATRIAL RATE - MUSE: 91 BPM
DIASTOLIC BLOOD PRESSURE - MUSE: NORMAL MMHG
INTERPRETATION ECG - MUSE: NORMAL
P AXIS - MUSE: 49 DEGREES
PR INTERVAL - MUSE: 198 MS
QRS DURATION - MUSE: 106 MS
QT - MUSE: 376 MS
QTC - MUSE: 462 MS
R AXIS - MUSE: 40 DEGREES
SYSTOLIC BLOOD PRESSURE - MUSE: NORMAL MMHG
T AXIS - MUSE: 44 DEGREES
VENTRICULAR RATE- MUSE: 91 BPM

## 2021-11-06 NOTE — ED PROVIDER NOTES
History     Chief Complaint:  Abnormal Labs (had follow up for coronary angiogram and found his wbc to be elevated)       HPI   Son BETO Begum is a 80 year old male who presents at the behest of Norah Wick PA-C for concern of elevated white blood cell count.  He had an angiogram with stent placement done on October 21.  He was seen in follow-up by his ELDA and had blood work done.  There was concern that his white blood cell count was elevated.  He was sent for outpatient evaluation and went to urgent care.  He was sent to the emergency department after CBC showed a further increase in his white blood cell count.  He has had no fevers.  He is not having any chest discomfort, cough, nausea, vomiting, diarrhea, urinary symptoms, rash or abdominal pain.  He denies any pain anyplace.  No other symptoms.    From Sharyn Chand PAC Note 11/3/21:         Assessment and Plan:        1.  CAD, NSTEMI, s/p PCI to 99% OM1 lesion on October 21, 2021 with Dr. Sexton.  Residual 95% mid LAD and 70% distal LAD lesions noted with plans for outpatient staged PCI. On DAPT with Brilinta.   2.  Preserved LVEF of 55-60%.  3.  DMII. a1c 7.9%. Managed by PCP, on metformin.  4.  HTN  5.  HLP     Plan:  - Stop atenolol.  - CBC today, to follow up on mild anemia. His renal function is normal.   - Plan for staged PCI end of November with Dr. Sexton.   - Return to see Dr. Otero in 6 months.      ROS:  Review of Systems     Allergies:  Janet  Prinivil [Lisinopril]     Medications:    aspirin (ASA) 81 MG EC tablet  atorvastatin (LIPITOR) 40 MG tablet  chlorthalidone (HYGROTON) 25 MG tablet  cloNIDine (CATAPRES) 0.1 MG tablet  glipiZIDE (GLUCOTROL XL) 2.5 MG 24 hr tablet  irbesartan (AVAPRO) 300 MG tablet  metFORMIN (GLUCOPHAGE-XR) 500 MG 24 hr tablet  metoprolol succinate ER (TOPROL-XL) 50 MG 24 hr tablet  nitroGLYcerin (NITROSTAT) 0.3 MG sublingual tablet  ticagrelor (BRILINTA) 90 MG tablet  Vitamin D3 (CHOLECALCIFEROL) 25 mcg (1000  "units) tablet        Past Medical History:    Past Medical History:   Diagnosis Date     Diabetes (H)      Hypertension      Patient Active Problem List   Diagnosis     NSTEMI (non-ST elevated myocardial infarction) (H)     Chest pain, unspecified type     Diabetes mellitus, type 2 (H)        Past Surgical History:    Past Surgical History:   Procedure Laterality Date     CV CORONARY ANGIOGRAM N/A 10/21/2021    Procedure: Coronary Angiogram;  Surgeon: Fuad Sexton MD;  Location: [facility] CARDIAC CATH LAB     CV FEMORAL ANGIOGRAM N/A 10/21/2021    Procedure: Femoral Angiogram;  Surgeon: Fuad Sexton MD;  Location: [facility] CARDIAC CATH LAB     CV PCI ANGIOPLASTY N/A 10/21/2021    Procedure: Percutaneous Transluminal Angioplasty;  Surgeon: Fuad Sexton MD;  Location: [facility] CARDIAC CATH LAB        Family History:    family history is not on file.    Social History:   reports that he quit smoking about 42 years ago. He has never used smokeless tobacco. He reports previous alcohol use. He reports that he does not use drugs.  PCP: Klarissa Clifton     Physical Exam     Patient Vitals for the past 24 hrs:   BP Temp Temp src Pulse Resp SpO2 Height Weight   11/05/21 2350 (!) 141/81 -- -- 87 16 100 % -- --   11/05/21 2340 -- -- -- -- -- 100 % -- --   11/05/21 2330 131/74 -- -- 90 -- 100 % -- --   11/05/21 2220 -- -- -- -- -- 99 % -- --   11/05/21 2210 137/78 -- -- 90 -- 99 % -- --   11/05/21 1909 (!) 147/68 98.3  F (36.8  C) Temporal 104 18 100 % 1.676 m (5' 6\") 84.4 kg (186 lb)        Physical Exam  General: Well-nourished, appears to be resting comfortably when I enter the room  Eyes: PERRL, conjunctivae pink no scleral icterus or conjunctival injection  ENT:  Moist mucus membranes, posterior oropharynx clear without erythema or exudates  Respiratory:  Lungs clear to auscultation bilaterally, no crackles/rubs/wheezes.  Good air movement  CV: Normal rate and rhythm, no murmurs/rubs/gallops  GI:  Abdomen " soft and non-distended.  Normoactive BS.  No tenderness, guarding or rebound  Skin: Warm, dry.  No rashes or petechiae. Groin site well cleared  Musculoskeletal: No peripheral edema or calf tenderness  Neuro: Alert and oriented to person/place/time  Psychiatric: Normal affect      Emergency Department Course   EC bpm.  WI interval 198 ms.  QRS duration 106 ms.  QT/QTc space 376/462 ms.  Normal sinus rhythm.  Normal EKG.  No ST elevation/depression, q waves, twave inversion.  Normal intervals.  No delta wave, short pr or brugada criteria.    Imaging:  XR Chest 2 Views   Final Result   IMPRESSION: Shallow inspiration. Stable cardiomediastinal silhouette. No focal consolidation or pleural effusion. Atherosclerotic aorta. Degenerative change osseous structures.         Report per radiology    Laboratory:  Labs Ordered and Resulted from Time of ED Arrival to Time of ED Departure   COMPREHENSIVE METABOLIC PANEL - Abnormal       Result Value    Sodium 131 (*)     Potassium 4.0      Chloride 102      Carbon Dioxide (CO2) 22      Anion Gap 7      Urea Nitrogen 28      Creatinine 0.96      Calcium 8.5      Glucose 113 (*)     Alkaline Phosphatase 137      AST 22      ALT 41      Protein Total 6.7 (*)     Albumin 3.0 (*)     Bilirubin Total 0.3      GFR Estimate 75     CBC WITH PLATELETS AND DIFFERENTIAL - Abnormal    WBC Count 16.7 (*)     RBC Count 4.74      Hemoglobin 11.0 (*)     Hematocrit 34.4 (*)     MCV 73 (*)     MCH 23.2 (*)     MCHC 32.0      RDW 16.1 (*)     Platelet Count 621 (*)    DIFFERENTIAL - Abnormal    % Neutrophils 41      % Lymphocytes 48      % Monocytes 6      % Eosinophils 3      % Basophils 0      % Metamyelocytes 2      Absolute Neutrophils 6.8      Absolute Lymphocytes 8.0 (*)     Absolute Monocytes 1.0      Absolute Eosinophils 0.5      Absolute Basophils 0.0      Absolute Metamyelocytes 0.3 (*)     RBC Morphology Confirmed RBC Indices      Platelet Assessment        Value: Automated Count  Confirmed. Platelet morphology is normal.    Acanthocytes Slight (*)     Elliptocytes Moderate (*)     RBC Fragments Moderate (*)     Smudge Cells Present (*)    TROPONIN I - Normal    Troponin I <0.015     BLOOD CULTURE   BLOOD CULTURE      Emergency Department Course:  Reviewed:  I reviewed nursing notes, vitals, past medical history and Care Everywhere    Assessments:  I obtained history and examined the patient as noted above.   I rechecked the patient and his son and explained findings.       Consults:   I consulted with Dr. Garcia from cardiology.    Interventions:  Medications   0.9% sodium chloride BOLUS (0 mLs Intravenous Stopped 11/5/21 8753)        Disposition:  The patient was discharged to home.     Impression & Plan      Covid-19  Kostas Begum was evaluated during a global COVID-19 pandemic, which necessitated consideration that the patient might be at risk for infection with the SARS-CoV-2 virus that causes COVID-19.   Applicable protocols for evaluation were followed during the patient's care.   COVID-19 was considered as part of the patient's evaluation.     Medical Decision Making:  Kostas Begum is a 80 year old male who is referred to the emergency department for nonspecific leukocytosis.  Cultures were obtained.  EKG is nonischemic.  Troponin is negative.  He has very mild hyponatremia and chronic anemia.  No history of bleeding.  No symptoms or signs of infection.  Chest x-ray was clear.  Skin exam was without signs of cellulitis.  He has no abdominal tenderness.  He has no cough and his chest x-ray was clear.  I discussed with cardiology as they had referred him to the emergency department and they did not feel that there was great concern for a cardiac etiology or complication.  At this time, I do feel he safe for discharge home with follow-up with primary care doctor.    Diagnosis:    ICD-10-CM    1. Leukocytosis, unspecified type  D72.829    2. Anemia, unspecified type  D64.9          Discharge Medications:  Discharge Medication List as of 11/5/2021 11:52 PM           11/5/2021   Brandy Maya MD Cho, Amy C, MD  11/06/21 0242

## 2021-11-06 NOTE — DISCHARGE INSTRUCTIONS
*You may resume diet and activities as tolerated.  *No new medications.  *Follow-up with your primary care doctor in the next few days.  *Return if you develop fever, chest or abdominal pain, nausea, vomiting, diarrhea, rash, urinary symptoms, cough, blood in you stool or black stools, faint or feel like you will faint or become worse in any way.

## 2021-11-08 ENCOUNTER — HOSPITAL ENCOUNTER (OUTPATIENT)
Dept: CARDIAC REHAB | Facility: CLINIC | Age: 81
End: 2021-11-08
Attending: STUDENT IN AN ORGANIZED HEALTH CARE EDUCATION/TRAINING PROGRAM
Payer: COMMERCIAL

## 2021-11-08 PROCEDURE — 93798 PHYS/QHP OP CAR RHAB W/ECG: CPT | Performed by: REHABILITATION PRACTITIONER

## 2021-11-10 ENCOUNTER — HOSPITAL ENCOUNTER (OUTPATIENT)
Dept: CARDIAC REHAB | Facility: CLINIC | Age: 81
End: 2021-11-10
Attending: STUDENT IN AN ORGANIZED HEALTH CARE EDUCATION/TRAINING PROGRAM
Payer: COMMERCIAL

## 2021-11-10 LAB — BACTERIA BLD CULT: NO GROWTH

## 2021-11-10 PROCEDURE — 93798 PHYS/QHP OP CAR RHAB W/ECG: CPT | Performed by: CLINICAL EXERCISE PHYSIOLOGIST

## 2021-11-11 LAB
BACTERIA BLD CULT: NO GROWTH
BACTERIA BLD CULT: NO GROWTH

## 2021-11-12 ENCOUNTER — HOSPITAL ENCOUNTER (OUTPATIENT)
Dept: CARDIAC REHAB | Facility: CLINIC | Age: 81
End: 2021-11-12
Attending: STUDENT IN AN ORGANIZED HEALTH CARE EDUCATION/TRAINING PROGRAM
Payer: COMMERCIAL

## 2021-11-12 PROCEDURE — 93798 PHYS/QHP OP CAR RHAB W/ECG: CPT | Performed by: REHABILITATION PRACTITIONER

## 2021-11-15 ENCOUNTER — HOSPITAL ENCOUNTER (OUTPATIENT)
Dept: CARDIAC REHAB | Facility: CLINIC | Age: 81
End: 2021-11-15
Attending: STUDENT IN AN ORGANIZED HEALTH CARE EDUCATION/TRAINING PROGRAM
Payer: COMMERCIAL

## 2021-11-15 PROCEDURE — 93798 PHYS/QHP OP CAR RHAB W/ECG: CPT

## 2021-11-17 ENCOUNTER — HOSPITAL ENCOUNTER (OUTPATIENT)
Dept: CARDIAC REHAB | Facility: CLINIC | Age: 81
End: 2021-11-17
Attending: STUDENT IN AN ORGANIZED HEALTH CARE EDUCATION/TRAINING PROGRAM
Payer: COMMERCIAL

## 2021-11-17 ENCOUNTER — LAB (OUTPATIENT)
Dept: LAB | Facility: CLINIC | Age: 81
End: 2021-11-17
Payer: COMMERCIAL

## 2021-11-17 DIAGNOSIS — I25.10 CORONARY ARTERY DISEASE INVOLVING NATIVE CORONARY ARTERY OF NATIVE HEART WITHOUT ANGINA PECTORIS: ICD-10-CM

## 2021-11-17 LAB
ANION GAP SERPL CALCULATED.3IONS-SCNC: 7 MMOL/L (ref 3–14)
BUN SERPL-MCNC: 16 MG/DL (ref 7–30)
CALCIUM SERPL-MCNC: 8.4 MG/DL (ref 8.5–10.1)
CHLORIDE BLD-SCNC: 105 MMOL/L (ref 94–109)
CO2 SERPL-SCNC: 26 MMOL/L (ref 20–32)
CREAT SERPL-MCNC: 0.93 MG/DL (ref 0.66–1.25)
ERYTHROCYTE [DISTWIDTH] IN BLOOD BY AUTOMATED COUNT: 18.5 % (ref 10–15)
GFR SERPL CREATININE-BSD FRML MDRD: 78 ML/MIN/1.73M2
GLUCOSE BLD-MCNC: 145 MG/DL (ref 70–99)
HCT VFR BLD AUTO: 34.1 % (ref 40–53)
HGB BLD-MCNC: 10.6 G/DL (ref 13.3–17.7)
MCH RBC QN AUTO: 23.6 PG (ref 26.5–33)
MCHC RBC AUTO-ENTMCNC: 31.1 G/DL (ref 31.5–36.5)
MCV RBC AUTO: 76 FL (ref 78–100)
PLATELET # BLD AUTO: 476 10E3/UL (ref 150–450)
POTASSIUM BLD-SCNC: 3.7 MMOL/L (ref 3.4–5.3)
RBC # BLD AUTO: 4.5 10E6/UL (ref 4.4–5.9)
SODIUM SERPL-SCNC: 138 MMOL/L (ref 133–144)
WBC # BLD AUTO: 14 10E3/UL (ref 4–11)

## 2021-11-17 PROCEDURE — 93798 PHYS/QHP OP CAR RHAB W/ECG: CPT | Performed by: REHABILITATION PRACTITIONER

## 2021-11-17 PROCEDURE — 85027 COMPLETE CBC AUTOMATED: CPT | Performed by: PHYSICIAN ASSISTANT

## 2021-11-17 PROCEDURE — 80048 BASIC METABOLIC PNL TOTAL CA: CPT | Performed by: PHYSICIAN ASSISTANT

## 2021-11-17 PROCEDURE — 36415 COLL VENOUS BLD VENIPUNCTURE: CPT | Performed by: PHYSICIAN ASSISTANT

## 2021-11-18 ENCOUNTER — TELEPHONE (OUTPATIENT)
Dept: CARDIOLOGY | Facility: CLINIC | Age: 81
End: 2021-11-18
Payer: COMMERCIAL

## 2021-11-18 NOTE — RESULT ENCOUNTER NOTE
Son's white count is trending down, however still elevated. Blood cultures from 11/5 were negative.  Note that he saw Bhumika Thomas on 11/6 at [facility]. I cannot see her full note, but patient instructions tell him to go forward with his cardiology procedure as scheduled 11/30, and he was also referred to heme/onc. I think it is prudent that he see heme/onc prior to staged PCI. Dr. Otero, what are your feelings on this?

## 2021-11-18 NOTE — TELEPHONE ENCOUNTER
Message  -  Message  Received: Today   x danielle Wick, ELDA Hartman [facility] Heart Team 2  Cc: Adithya Otero MD  Son's white count is trending down, however still elevated. Blood cultures from 11/5 were negative.  Note that he saw Bhumika Thomas on 11/6 at [facility]. I cannot see her full note, but patient instructions tell him to go forward with his cardiology procedure as scheduled 11/30, and he was also referred to heme/onc. I think it is prudent that he see heme/onc prior to staged PCI. Dr. Otero, what are your feelings on this?     Awaiting Dr. Otero's reply

## 2021-11-19 ENCOUNTER — HOSPITAL ENCOUNTER (OUTPATIENT)
Dept: CARDIAC REHAB | Facility: CLINIC | Age: 81
End: 2021-11-19
Attending: STUDENT IN AN ORGANIZED HEALTH CARE EDUCATION/TRAINING PROGRAM
Payer: COMMERCIAL

## 2021-11-19 PROCEDURE — 93798 PHYS/QHP OP CAR RHAB W/ECG: CPT | Performed by: CLINICAL EXERCISE PHYSIOLOGIST

## 2021-11-22 ENCOUNTER — HOSPITAL ENCOUNTER (OUTPATIENT)
Dept: CARDIAC REHAB | Facility: CLINIC | Age: 81
End: 2021-11-22
Attending: STUDENT IN AN ORGANIZED HEALTH CARE EDUCATION/TRAINING PROGRAM
Payer: COMMERCIAL

## 2021-11-22 PROCEDURE — 93798 PHYS/QHP OP CAR RHAB W/ECG: CPT | Performed by: REHABILITATION PRACTITIONER

## 2021-11-22 NOTE — TELEPHONE ENCOUNTER
I spoke with Dr. Otero, who feels patient is OK to proceed with his staged intervention, as scheduled. Thanks!

## 2021-11-24 ENCOUNTER — HOSPITAL ENCOUNTER (OUTPATIENT)
Dept: CARDIAC REHAB | Facility: CLINIC | Age: 81
End: 2021-11-24
Attending: STUDENT IN AN ORGANIZED HEALTH CARE EDUCATION/TRAINING PROGRAM
Payer: COMMERCIAL

## 2021-11-24 PROCEDURE — 93798 PHYS/QHP OP CAR RHAB W/ECG: CPT | Performed by: CLINICAL EXERCISE PHYSIOLOGIST

## 2021-11-26 ENCOUNTER — TELEPHONE (OUTPATIENT)
Dept: CARDIOLOGY | Facility: CLINIC | Age: 81
End: 2021-11-26
Payer: COMMERCIAL

## 2021-11-26 ENCOUNTER — HOSPITAL ENCOUNTER (OUTPATIENT)
Dept: CARDIAC REHAB | Facility: CLINIC | Age: 81
End: 2021-11-26
Attending: STUDENT IN AN ORGANIZED HEALTH CARE EDUCATION/TRAINING PROGRAM
Payer: COMMERCIAL

## 2021-11-26 DIAGNOSIS — I25.10 CAD (CORONARY ARTERY DISEASE): Primary | ICD-10-CM

## 2021-11-26 PROCEDURE — 93798 PHYS/QHP OP CAR RHAB W/ECG: CPT

## 2021-11-26 RX ORDER — ASPIRIN 81 MG/1
243 TABLET, CHEWABLE ORAL ONCE
Status: CANCELLED | OUTPATIENT
Start: 2021-11-26

## 2021-11-26 RX ORDER — POTASSIUM CHLORIDE 1500 MG/1
20 TABLET, EXTENDED RELEASE ORAL
Status: CANCELLED | OUTPATIENT
Start: 2021-11-26

## 2021-11-26 RX ORDER — ASPIRIN 325 MG
325 TABLET ORAL ONCE
Status: CANCELLED | OUTPATIENT
Start: 2021-11-26 | End: 2021-11-26

## 2021-11-26 RX ORDER — LIDOCAINE 40 MG/G
CREAM TOPICAL
Status: CANCELLED | OUTPATIENT
Start: 2021-11-26

## 2021-11-26 RX ORDER — SODIUM CHLORIDE 9 MG/ML
INJECTION, SOLUTION INTRAVENOUS CONTINUOUS
Status: CANCELLED | OUTPATIENT
Start: 2021-11-26

## 2021-11-26 NOTE — TELEPHONE ENCOUNTER
[facility] - ANGIOGRAM INSTRUCTIONS:  - Son BETO Begum is scheduled for a coronary angiogram with staged intervention (Residual 95% mid LAD and 70% distal LAD lesions noted with plans for outpatient staged PCI). [facility] on 21. Check in time is at 0630,  - Advised patient not eat or drink after midnight on 21.   - Patient advised to take morning medications with a sip of water on the day of the procedure, noting the followin. Aspirin: Patient is currently taking 81mg of aspirin, patient instructed to take 4 tabs (324mg) of aspirin the morning of the procedure.  2. Diabetic Medications: Hold Metformin and Glucotrol the day of procedure  3. Anticoagulants: Patient does not take an anticoagulant.  4. Diuretics: Patient does not take diuretics.  5. All vitamins and supplements to be held the morning of the procedure.  - Verified patient does not have an allergy to contrast dye.  - Verified patient has someone available to drive them home from the hospital and can stay with them for 24 hours after the procedure. They understand that one visitor may accompany them into the hospital on the day of angiogram, with both patient and visitor to wear a mask.  - COVID testing: Scheduled on 21.    I called patient's granddaughter and left her this msg.  Instructed her to call back if any questions.    Angiogram orders have been signed & held.    Marley Claire RN  [facility]

## 2021-11-28 ENCOUNTER — LAB (OUTPATIENT)
Dept: URGENT CARE | Facility: URGENT CARE | Age: 81
End: 2021-11-28
Attending: PHYSICIAN ASSISTANT
Payer: COMMERCIAL

## 2021-11-28 DIAGNOSIS — R07.9 CHEST PAIN: ICD-10-CM

## 2021-11-28 PROCEDURE — U0005 INFEC AGEN DETEC AMPLI PROBE: HCPCS

## 2021-11-28 PROCEDURE — U0003 INFECTIOUS AGENT DETECTION BY NUCLEIC ACID (DNA OR RNA); SEVERE ACUTE RESPIRATORY SYNDROME CORONAVIRUS 2 (SARS-COV-2) (CORONAVIRUS DISEASE [COVID-19]), AMPLIFIED PROBE TECHNIQUE, MAKING USE OF HIGH THROUGHPUT TECHNOLOGIES AS DESCRIBED BY CMS-2020-01-R: HCPCS

## 2021-11-29 LAB — SARS-COV-2 RNA RESP QL NAA+PROBE: NEGATIVE

## 2021-11-30 ENCOUNTER — HOSPITAL ENCOUNTER (OUTPATIENT)
Facility: CLINIC | Age: 81
Discharge: HOME | End: 2021-11-30
Admitting: INTERNAL MEDICINE
Payer: COMMERCIAL

## 2021-11-30 ENCOUNTER — APPOINTMENT (OUTPATIENT)
Dept: CARDIOLOGY | Facility: CLINIC | Age: 81
End: 2021-11-30
Attending: INTERNAL MEDICINE
Payer: COMMERCIAL

## 2021-11-30 VITALS
BODY MASS INDEX: 29.73 KG/M2 | HEART RATE: 82 BPM | HEIGHT: 66 IN | OXYGEN SATURATION: 96 % | TEMPERATURE: 97.9 F | WEIGHT: 185 LBS | RESPIRATION RATE: 16 BRPM | DIASTOLIC BLOOD PRESSURE: 71 MMHG | SYSTOLIC BLOOD PRESSURE: 116 MMHG

## 2021-11-30 DIAGNOSIS — I25.10 CORONARY ARTERY DISEASE INVOLVING NATIVE CORONARY ARTERY OF NATIVE HEART WITHOUT ANGINA PECTORIS: ICD-10-CM

## 2021-11-30 DIAGNOSIS — I25.10 CAD (CORONARY ARTERY DISEASE): ICD-10-CM

## 2021-11-30 PROBLEM — Z98.890 STATUS POST CORONARY ANGIOGRAM: Status: ACTIVE | Noted: 2021-11-30

## 2021-11-30 LAB
ACT BLD: 219 SECONDS (ref 74–150)
ACT BLD: 243 SECONDS (ref 74–150)
ACT BLD: 259 SECONDS (ref 74–150)
ACT BLD: 263 SECONDS (ref 74–150)
ACT BLD: 292 SECONDS (ref 74–150)
ACT BLD: 328 SECONDS (ref 74–150)
ANION GAP SERPL CALCULATED.3IONS-SCNC: 5 MMOL/L (ref 3–14)
BUN SERPL-MCNC: 14 MG/DL (ref 7–30)
CALCIUM SERPL-MCNC: 8.5 MG/DL (ref 8.5–10.1)
CHLORIDE BLD-SCNC: 110 MMOL/L (ref 94–109)
CHOLEST SERPL-MCNC: 90 MG/DL
CO2 SERPL-SCNC: 27 MMOL/L (ref 20–32)
CREAT SERPL-MCNC: 0.95 MG/DL (ref 0.66–1.25)
ERYTHROCYTE [DISTWIDTH] IN BLOOD BY AUTOMATED COUNT: 20.8 % (ref 10–15)
GFR SERPL CREATININE-BSD FRML MDRD: 76 ML/MIN/1.73M2
GLUCOSE BLD-MCNC: 171 MG/DL (ref 70–99)
HCT VFR BLD AUTO: 34.2 % (ref 40–53)
HDLC SERPL-MCNC: 37 MG/DL
HGB BLD-MCNC: 10.2 G/DL (ref 13.3–17.7)
HOLD SPECIMEN: NORMAL
LDLC SERPL CALC-MCNC: 36 MG/DL
LVEF ECHO: NORMAL
MCH RBC QN AUTO: 23.3 PG (ref 26.5–33)
MCHC RBC AUTO-ENTMCNC: 29.8 G/DL (ref 31.5–36.5)
MCV RBC AUTO: 78 FL (ref 78–100)
NONHDLC SERPL-MCNC: 53 MG/DL
PLATELET # BLD AUTO: 714 10E3/UL (ref 150–450)
POTASSIUM BLD-SCNC: 4.1 MMOL/L (ref 3.4–5.3)
RBC # BLD AUTO: 4.37 10E6/UL (ref 4.4–5.9)
SODIUM SERPL-SCNC: 142 MMOL/L (ref 133–144)
TRIGL SERPL-MCNC: 86 MG/DL
WBC # BLD AUTO: 10.2 10E3/UL (ref 4–11)

## 2021-11-30 PROCEDURE — 92978 ENDOLUMINL IVUS OCT C 1ST: CPT | Mod: 26 | Performed by: INTERNAL MEDICINE

## 2021-11-30 PROCEDURE — 93325 DOPPLER ECHO COLOR FLOW MAPG: CPT | Mod: 26 | Performed by: INTERNAL MEDICINE

## 2021-11-30 PROCEDURE — 93308 TTE F-UP OR LMTD: CPT | Mod: 26 | Performed by: INTERNAL MEDICINE

## 2021-11-30 PROCEDURE — 99152 MOD SED SAME PHYS/QHP 5/>YRS: CPT | Mod: GC | Performed by: INTERNAL MEDICINE

## 2021-11-30 PROCEDURE — 999N000184 HC STATISTIC TELEMETRY

## 2021-11-30 PROCEDURE — 36415 COLL VENOUS BLD VENIPUNCTURE: CPT

## 2021-11-30 PROCEDURE — C9602 PERC D-E COR STENT ATHER S: HCPCS | Mod: LD | Performed by: INTERNAL MEDICINE

## 2021-11-30 PROCEDURE — 92978 ENDOLUMINL IVUS OCT C 1ST: CPT | Performed by: INTERNAL MEDICINE

## 2021-11-30 PROCEDURE — 99152 MOD SED SAME PHYS/QHP 5/>YRS: CPT | Performed by: INTERNAL MEDICINE

## 2021-11-30 PROCEDURE — C1725 CATH, TRANSLUMIN NON-LASER: HCPCS | Performed by: INTERNAL MEDICINE

## 2021-11-30 PROCEDURE — 250N000013 HC RX MED GY IP 250 OP 250 PS 637: Performed by: STUDENT IN AN ORGANIZED HEALTH CARE EDUCATION/TRAINING PROGRAM

## 2021-11-30 PROCEDURE — 92933 PRQ TRLML C ATHRC ST ANGIOP1: CPT | Mod: LD | Performed by: INTERNAL MEDICINE

## 2021-11-30 PROCEDURE — 93454 CORONARY ARTERY ANGIO S&I: CPT | Performed by: INTERNAL MEDICINE

## 2021-11-30 PROCEDURE — 85027 COMPLETE CBC AUTOMATED: CPT

## 2021-11-30 PROCEDURE — 999N000071 HC STATISTIC HEART CATH LAB OR EP LAB

## 2021-11-30 PROCEDURE — 258N000003 HC RX IP 258 OP 636: Performed by: INTERNAL MEDICINE

## 2021-11-30 PROCEDURE — 93005 ELECTROCARDIOGRAM TRACING: CPT

## 2021-11-30 PROCEDURE — 93325 DOPPLER ECHO COLOR FLOW MAPG: CPT

## 2021-11-30 PROCEDURE — C9600 PERC DRUG-EL COR STENT SING: HCPCS | Mod: LD | Performed by: INTERNAL MEDICINE

## 2021-11-30 PROCEDURE — C1887 CATHETER, GUIDING: HCPCS | Performed by: INTERNAL MEDICINE

## 2021-11-30 PROCEDURE — 93321 DOPPLER ECHO F-UP/LMTD STD: CPT | Mod: 26 | Performed by: INTERNAL MEDICINE

## 2021-11-30 PROCEDURE — 250N000013 HC RX MED GY IP 250 OP 250 PS 637: Performed by: INTERNAL MEDICINE

## 2021-11-30 PROCEDURE — 250N000009 HC RX 250: Performed by: INTERNAL MEDICINE

## 2021-11-30 PROCEDURE — C1760 CLOSURE DEV, VASC: HCPCS | Performed by: INTERNAL MEDICINE

## 2021-11-30 PROCEDURE — 250N000011 HC RX IP 250 OP 636: Performed by: INTERNAL MEDICINE

## 2021-11-30 PROCEDURE — C1769 GUIDE WIRE: HCPCS | Performed by: INTERNAL MEDICINE

## 2021-11-30 PROCEDURE — C1724 CATH, TRANS ATHEREC,ROTATION: HCPCS | Performed by: INTERNAL MEDICINE

## 2021-11-30 PROCEDURE — 99153 MOD SED SAME PHYS/QHP EA: CPT | Performed by: INTERNAL MEDICINE

## 2021-11-30 PROCEDURE — 80048 BASIC METABOLIC PNL TOTAL CA: CPT

## 2021-11-30 PROCEDURE — 999N000054 HC STATISTIC EKG NON-CHARGEABLE

## 2021-11-30 PROCEDURE — 85347 COAGULATION TIME ACTIVATED: CPT

## 2021-11-30 PROCEDURE — C1874 STENT, COATED/COV W/DEL SYS: HCPCS | Performed by: INTERNAL MEDICINE

## 2021-11-30 PROCEDURE — 82465 ASSAY BLD/SERUM CHOLESTEROL: CPT

## 2021-11-30 PROCEDURE — 272N000001 HC OR GENERAL SUPPLY STERILE: Performed by: INTERNAL MEDICINE

## 2021-11-30 DEVICE — STENT CORONARY DES SYNERGY XD MR US 2.50X38MM H7493941838250: Type: IMPLANTABLE DEVICE | Status: FUNCTIONAL

## 2021-11-30 DEVICE — STENT CORONARY DES SYNERGY XD MR US 3.00X38MM H7493941838300: Type: IMPLANTABLE DEVICE | Status: FUNCTIONAL

## 2021-11-30 DEVICE — CLOSURE ANGIOSEAL 6FR 610130: Type: IMPLANTABLE DEVICE | Status: FUNCTIONAL

## 2021-11-30 RX ORDER — ASPIRIN 81 MG/1
81 TABLET, CHEWABLE ORAL ONCE
Status: DISCONTINUED | OUTPATIENT
Start: 2021-11-30 | End: 2021-11-30 | Stop reason: HOSPADM

## 2021-11-30 RX ORDER — HEPARIN SODIUM 1000 [USP'U]/ML
INJECTION, SOLUTION INTRAVENOUS; SUBCUTANEOUS
Status: DISCONTINUED | OUTPATIENT
Start: 2021-11-30 | End: 2021-11-30 | Stop reason: HOSPADM

## 2021-11-30 RX ORDER — NITROGLYCERIN 5 MG/ML
VIAL (ML) INTRAVENOUS
Status: DISCONTINUED | OUTPATIENT
Start: 2021-11-30 | End: 2021-11-30 | Stop reason: HOSPADM

## 2021-11-30 RX ORDER — ONDANSETRON 2 MG/ML
4 INJECTION INTRAMUSCULAR; INTRAVENOUS EVERY 6 HOURS PRN
Status: DISCONTINUED | OUTPATIENT
Start: 2021-11-30 | End: 2021-11-30 | Stop reason: HOSPADM

## 2021-11-30 RX ORDER — ASPIRIN 81 MG/1
243 TABLET, CHEWABLE ORAL ONCE
Status: COMPLETED | OUTPATIENT
Start: 2021-11-30 | End: 2021-11-30

## 2021-11-30 RX ORDER — LIDOCAINE HYDROCHLORIDE AND EPINEPHRINE 10; 10 MG/ML; UG/ML
1 INJECTION, SOLUTION INFILTRATION; PERINEURAL ONCE
Status: COMPLETED | OUTPATIENT
Start: 2021-11-30 | End: 2021-11-30

## 2021-11-30 RX ORDER — SODIUM CHLORIDE 9 MG/ML
INJECTION, SOLUTION INTRAVENOUS CONTINUOUS
Status: DISCONTINUED | OUTPATIENT
Start: 2021-11-30 | End: 2021-11-30 | Stop reason: HOSPADM

## 2021-11-30 RX ORDER — ASPIRIN 81 MG/1
81 TABLET ORAL DAILY
Status: DISCONTINUED | OUTPATIENT
Start: 2021-12-01 | End: 2021-11-30 | Stop reason: HOSPADM

## 2021-11-30 RX ORDER — NITROGLYCERIN 0.4 MG/1
0.4 TABLET SUBLINGUAL EVERY 5 MIN PRN
Status: DISCONTINUED | OUTPATIENT
Start: 2021-11-30 | End: 2021-11-30 | Stop reason: HOSPADM

## 2021-11-30 RX ORDER — NALOXONE HYDROCHLORIDE 0.4 MG/ML
0.4 INJECTION, SOLUTION INTRAMUSCULAR; INTRAVENOUS; SUBCUTANEOUS
Status: DISCONTINUED | OUTPATIENT
Start: 2021-11-30 | End: 2021-11-30 | Stop reason: HOSPADM

## 2021-11-30 RX ORDER — NALOXONE HYDROCHLORIDE 0.4 MG/ML
0.2 INJECTION, SOLUTION INTRAMUSCULAR; INTRAVENOUS; SUBCUTANEOUS
Status: DISCONTINUED | OUTPATIENT
Start: 2021-11-30 | End: 2021-11-30 | Stop reason: HOSPADM

## 2021-11-30 RX ORDER — OXYCODONE HYDROCHLORIDE 5 MG/1
5 TABLET ORAL EVERY 4 HOURS PRN
Status: DISCONTINUED | OUTPATIENT
Start: 2021-11-30 | End: 2021-11-30 | Stop reason: HOSPADM

## 2021-11-30 RX ORDER — LIDOCAINE 40 MG/G
CREAM TOPICAL
Status: DISCONTINUED | OUTPATIENT
Start: 2021-11-30 | End: 2021-11-30 | Stop reason: HOSPADM

## 2021-11-30 RX ORDER — OXYCODONE HYDROCHLORIDE 5 MG/1
10 TABLET ORAL EVERY 4 HOURS PRN
Status: DISCONTINUED | OUTPATIENT
Start: 2021-11-30 | End: 2021-11-30 | Stop reason: HOSPADM

## 2021-11-30 RX ORDER — FLUMAZENIL 0.1 MG/ML
0.2 INJECTION, SOLUTION INTRAVENOUS
Status: DISCONTINUED | OUTPATIENT
Start: 2021-11-30 | End: 2021-11-30 | Stop reason: HOSPADM

## 2021-11-30 RX ORDER — HYDRALAZINE HYDROCHLORIDE 20 MG/ML
10 INJECTION INTRAMUSCULAR; INTRAVENOUS EVERY 4 HOURS PRN
Status: DISCONTINUED | OUTPATIENT
Start: 2021-11-30 | End: 2021-11-30 | Stop reason: HOSPADM

## 2021-11-30 RX ORDER — POTASSIUM CHLORIDE 1500 MG/1
20 TABLET, EXTENDED RELEASE ORAL
Status: DISCONTINUED | OUTPATIENT
Start: 2021-11-30 | End: 2021-11-30 | Stop reason: HOSPADM

## 2021-11-30 RX ORDER — IOPAMIDOL 755 MG/ML
INJECTION, SOLUTION INTRAVASCULAR
Status: DISCONTINUED | OUTPATIENT
Start: 2021-11-30 | End: 2021-11-30 | Stop reason: HOSPADM

## 2021-11-30 RX ORDER — FENTANYL CITRATE 50 UG/ML
INJECTION, SOLUTION INTRAMUSCULAR; INTRAVENOUS
Status: DISCONTINUED | OUTPATIENT
Start: 2021-11-30 | End: 2021-11-30 | Stop reason: HOSPADM

## 2021-11-30 RX ORDER — FENTANYL CITRATE 50 UG/ML
25 INJECTION, SOLUTION INTRAMUSCULAR; INTRAVENOUS
Status: DISCONTINUED | OUTPATIENT
Start: 2021-11-30 | End: 2021-11-30 | Stop reason: HOSPADM

## 2021-11-30 RX ORDER — METOPROLOL TARTRATE 1 MG/ML
5 INJECTION, SOLUTION INTRAVENOUS
Status: DISCONTINUED | OUTPATIENT
Start: 2021-11-30 | End: 2021-11-30 | Stop reason: HOSPADM

## 2021-11-30 RX ORDER — ATROPINE SULFATE 0.1 MG/ML
0.5 INJECTION INTRAVENOUS
Status: DISCONTINUED | OUTPATIENT
Start: 2021-11-30 | End: 2021-11-30 | Stop reason: HOSPADM

## 2021-11-30 RX ORDER — ASPIRIN 325 MG
325 TABLET ORAL ONCE
Status: COMPLETED | OUTPATIENT
Start: 2021-11-30 | End: 2021-11-30

## 2021-11-30 RX ORDER — ONDANSETRON 4 MG/1
4 TABLET, ORALLY DISINTEGRATING ORAL EVERY 6 HOURS PRN
Status: DISCONTINUED | OUTPATIENT
Start: 2021-11-30 | End: 2021-11-30 | Stop reason: HOSPADM

## 2021-11-30 RX ORDER — ACETAMINOPHEN 325 MG/1
650 TABLET ORAL EVERY 4 HOURS PRN
Status: DISCONTINUED | OUTPATIENT
Start: 2021-11-30 | End: 2021-11-30 | Stop reason: HOSPADM

## 2021-11-30 RX ORDER — SODIUM CHLORIDE 9 MG/ML
INJECTION, SOLUTION INTRAVENOUS CONTINUOUS
Status: ACTIVE | OUTPATIENT
Start: 2021-11-30 | End: 2021-11-30

## 2021-11-30 RX ADMIN — ASPIRIN 81 MG CHEWABLE TABLET 243 MG: 81 TABLET CHEWABLE at 07:45

## 2021-11-30 RX ADMIN — LIDOCAINE HYDROCHLORIDE,EPINEPHRINE BITARTRATE 1 ML: 10; .01 INJECTION, SOLUTION INFILTRATION; PERINEURAL at 14:15

## 2021-11-30 RX ADMIN — ACETAMINOPHEN 650 MG: 325 TABLET, FILM COATED ORAL at 15:17

## 2021-11-30 RX ADMIN — SODIUM CHLORIDE: 9 INJECTION, SOLUTION INTRAVENOUS at 07:24

## 2021-11-30 ASSESSMENT — MIFFLIN-ST. JEOR: SCORE: 1496.9

## 2021-11-30 NOTE — Clinical Note
The first balloon was inserted into the left anterior descending.Max pressure = 12 yue. Total duration = 30 seconds.     Max pressure = 16 yue. Total duration = 30 seconds.    Balloon reinflated a second time: Max pressure = 16 yue. Total duration = 30 seconds.  Balloon reinflated a third time: Max pressure = 20 yue. Total duration = 30 seconds.  Balloon reinflated a fourth time: Max pressure = 20 yue. Total duration = 25 seconds.

## 2021-11-30 NOTE — Clinical Note
Atherectomy performed in the left anterior descending. Rotational atherectomy was performed. Pass rate = 140 RPM. Pass duration = 17 seconds.

## 2021-11-30 NOTE — PROCEDURES
Pt continued oozing from cath site  angioseal had been placed by fellow who reported good angioseal  This appears to be tract ooze despite multiple hold attempts  Good pulse at RFA    I injected lido with epi 9cc and  Bleed stopped  Will do low pressure fem stop for 30 min and reassess  If ok then flat 2-3 hours and if ok then home  Discussed with RN

## 2021-11-30 NOTE — PROGRESS NOTES
1213 Report received from Cate Hodge RN.  1215 Pt A/O. Thrombix drsg to right groin puncture site - saturated with blood. Cont slow oozing noted. No hematoma noted. Area remains soft. Small firmness palpable to site from previous - unchanged. Pt denies pain. Activity restrictions reinforced with pt with verbal understanding received. Pt needs freq reminders not to lift his head up off the pillows. No family present at this time.  1230 Pt's son-in-law called & detailed update given. Will return to the hospital asap.  1250 Pt's son-in-law at bedside. Detailed update given. Interpretting for pt.  1300 Pt taking flds well. Refuses food at this time. No complaints.  1315 Drsg removed - saturated. Constant oozing noted from puncture site. Area soft. Deep firmness unchanged. Tegaderm applied.   1320 Report given to Cate Hodge RN.

## 2021-11-30 NOTE — Clinical Note
Atherectomy performed in the left anterior descending. Rotational atherectomy was performed. Pass rate = 140 RPM. Pass duration = 29 seconds.

## 2021-11-30 NOTE — PROGRESS NOTES
Care Suites Admission Nursing Note    Patient Information  Name: Kostas Begum  Age: 81 year old  Reason for admission: left heart cath  Care Suites arrival time: 0630    Visitor Information  Name: Kevin (son-in-law) [phone]  Informed of visitor restrictions: Yes  1 visitor allowed per patient   Visitor must screen negative for COVID symptoms   Visitor must wear a mask  Waiting rooms closed to visitors    Patient Admission/Assessment   Pre-procedure assessment complete: Yes  If abnormal assessment/labs, provider notified: Yes  NPO: Yes  Medications held per instructions/orders: N/A  Consent: deferred to MD  If applicable, pregnancy test status: NA  Patient oriented to room: Yes  Education/questions answered: Yes  Plan/other: proceed    Discharge Planning  Discharge name/phone number:Kevin (son-in-law) [phone]    Overnight post sedation caregiver: Kevin (as above)  Discharge location: home    PATIENT/VISITOR WELLNESS SCREENING    Step 1 Patient Screening    1. In the last month, have you been in contact with someone who was confirmed or suspected to have Coronavirus/COVID-19? No    2. Do you have the following symptoms?  Fever/Chills? No   Cough? No   Shortness of breath? No   New loss of taste or smell? No  Sore throat? No  Muscle or body aches? No  Headaches? No  Fatigue? No  Vomiting or diarrhea? No    Step 2 Visitor Screening    1. Name of Visitor (1 visitor per patient):[place] [phone]     2. In the last month, have you been in contact with someone who was confirmed or suspected to have Coronavirus/COVID-19? No    3. Do you have the following symptoms?  Fever/Chills? No   Cough? No   Shortness of breath? No   Skin rash? No   Loss of taste or smell? No  Sore throat? No  Runny or stuffy nose? No  Muscle or body aches? No  Headaches? No  Fatigue? No  Vomiting or diarrhea? No    If the visitor has positive symptoms, notify supervisor/manger  Per policy, the visitor will need to leave  the facility     Step 3 Refer to logic grid below for actions    NO SYMPTOM(S)    ACTIONS:  1. Standard rooming process  2. Provider to assess per normal protocol  3. Implement precautions as needed and per guidelines     POSITIVE SYMPTOM(S)  If positive for ANY of the following symptoms: fever, cough, shortness of breath, rash    ACTION:  1. Continue to have the patient wear a mask   2. Room patient as soon as possible  3. Don appropriate PPE when entering room  4. Provider evaluation        Maya Hodge RN

## 2021-11-30 NOTE — Clinical Note
Stent deployed in the left anterior descending. Max pressure = 11 yue. Total duration = 40 seconds. Balloon reinflated a second time: Max pressure = 14 yue. Total duration = 20 seconds. Balloon reinflated a third time: Max pressure = 16 yue. Total duration = 25 seconds.

## 2021-11-30 NOTE — PROGRESS NOTES
Dr. Sexton to use Lido with Epi at site due to continuous ooze of blood from site. Waiting for medicine from pharmacy. Pressure being applied to groin. Pt remains asymptomatic and pain free. Pt continue to lay flat in bed.

## 2021-11-30 NOTE — Clinical Note
Stent deployed in the left anterior descending. Max pressure = 11 yue. Total duration = 40 seconds. Balloon reinflated a second time: Max pressure = 11 yue. Total duration = 28 seconds.

## 2021-11-30 NOTE — DISCHARGE INSTRUCTIONS
Cardiac Angioplasty/Stent Discharge Instructions - Femoral    After you go home:      Have an adult stay with you until tomorrow.    Drink extra fluids for 2 days.    You may resume your normal diet.    No smoking       For 24 hours - due to the sedation you received:    Relax and take it easy.    Do NOT make any important or legal decisions.    Do NOT drive or operate machines at home or at work.    Do NOT drink alcohol.    Care of Groin Puncture Site:      For the first 24 hrs - check the puncture site every 1-2 hours while awake.    For 2 days, when you cough, sneeze, laugh or move your bowels, hold your hand over the puncture site and press firmly.    Remove the bandaid after 24 hours. If there is minor oozing, apply another bandaid and remove it after 12 hours.    It is normal to have a small bruise or pea size lump at the site.    You may shower tomorrow. Do NOT take a bath, or use a hot tub or pool for at least 3 days. Do NOT scrub the site. Do not use lotion or powder near the puncture site.     Activity:            For 2 days:    No stooping or squatting    Do NOT do any heavy activity such as exercise, lifting, or straining.     No housework, yard work or any activity that make you sweat    Do NOT lift more than 10 pounds    Bleeding:      If you start bleeding from the site in your groin, lie down flat and press firmly on/above the site for 10 minutes.     Once bleeding stops, lay flat for 2 hours.     Call [facility] as soon as you can.       Call 911 right away if you have heavy bleeding or bleeding that does not stop.      Medicines:      You are taking an antiplatelet medication - Brilinta - do not stop taking it until you talk to your cardiologist.        If you are on Metformin (Glucophage), do not restart it until you have blood tests (within 2 to 3 days after discharge).  After you have your blood drawn, you may restart the Metformin.     Take your medications, including blood thinners, unless  your provider tells you not to.      If you have stopped any medicines, check with your provider about when to restart them.    Follow Up Appointments:      Follow up with [facility] Nurse Practitioner at [facility] of patient preference in 7-10 days.    Cardiac Rehab will contact you for follow up care.    Call the clinic if:      You have increased pain or a large or growing hard lump around the site.    The site is red, swollen, hot or tender.    Blood or fluid is draining from the site.    You have chills or a fever greater than 101 F (38 C).    Your leg feels numb, cool or changes color.    You have hives, a rash or unusual itching.    New pain in the back or belly that you cannot control with Tylenol.    Any questions or concerns.      Other Instructions:      If you received a stent - carry your stent card with you at all times.      [facility] [place]:    [phone] [facility] (7 days a week)

## 2021-11-30 NOTE — PROGRESS NOTES
Dr. Sexton at bedside injecting 9cc of 1% Lidocaine with Epinephrine. Bleeding/oozing subsided afterwards. Pt tolerated well.

## 2021-11-30 NOTE — PRE-PROCEDURE
GENERAL PRE-PROCEDURE:   Procedure:  Coronary angiogram with planned PCI  Date/Time:  11/30/2021 8:21 AM    Written consent obtained?: Yes    Risks and benefits: Risks, benefits and alternatives were discussed    Consent given by:  Patient  Patient states understanding of procedure being performed: Yes    Patient's understanding of procedure matches consent: Yes    Procedure consent matches procedure scheduled: Yes    Expected level of sedation:  Moderate  Appropriately NPO:  Yes  ASA Class:  2  Mallampati  :  Grade 2- soft palate, base of uvula, tonsillar pillars, and portion of posterior pharyngeal wall visible  Lungs:  Lungs clear with good breath sounds bilaterally  Heart:  Normal heart sounds and rate  History & Physical reviewed:  History and physical reviewed and no updates needed  Statement of review:  I have reviewed the lab findings, diagnostic data, medications, and the plan for sedation  Interpretation provided by son-in-law per patient request

## 2021-11-30 NOTE — Clinical Note
Atherectomy performed in the left anterior descending. Rotational atherectomy was performed. Pass rate = 137 RPM. Pass duration = 25 seconds.

## 2021-11-30 NOTE — PROGRESS NOTES
1105 Pt returned from Cath Lab. A/O. Gauze drsg CDI to right groin puncture site. No oozing or hematoma noted. Area soft, but slightly puffy. Small firmness palpable deep around puncture site. Pt denies pain. Pt instructed on activity restrictions while on bedrest. Verbal understanding received from pt. No family present at this time.  1115 Pt needs frequent reminders not to lift head up off pillow. Report given to Cate Hodge RN. Small amt blood noted to 2x2 gauze drsg. Area remains soft.  1130 Drsg removed - continuous slow ooze noted from puncture site. Area remains soft. Deep firmness unchanged. Thrombix & tegaderm drsg applied.

## 2021-11-30 NOTE — PROGRESS NOTES
Continuing to hold pressure at right groin site. Site continues to ooze. Pt remains asymptomatic. Dr. Sexton states he wants to inject the local anesthetic and then have FemStop applied. Pt has been taking sips of water. Pt declined offer of juice or food.

## 2021-11-30 NOTE — Clinical Note
The first balloon was inserted into the left anterior descending.Max pressure = 16 yue. Total duration = 20 seconds.     Max pressure = 16 yue. Total duration = 20 seconds.    Balloon reinflated a second time: Max pressure = 16 yue. Total duration = 20 seconds.  Balloon reinflated a third time: Max pressure = 16 yue. Total duration = 20 seconds.  Balloon reinflated a fourth time: Max pressure = 20 yue. Total duration = 20 seconds.

## 2021-11-30 NOTE — PRE-PROCEDURE
GENERAL PRE-PROCEDURE:   Procedure:  Heart catheterization with intervention  Date/Time:  11/30/2021 8:03 AM    Written consent obtained?: Yes    Risks and benefits: Risks, benefits and alternatives were discussed    Consent given by:  Patient  Patient states understanding of procedure being performed: Yes    Patient's understanding of procedure matches consent: Yes    Procedure consent matches procedure scheduled: Yes    Expected level of sedation:  Moderate  Appropriately NPO:  Yes  ASA Class:  2  Mallampati  :  Grade 2- soft palate, base of uvula, tonsillar pillars, and portion of posterior pharyngeal wall visible  Lungs:  Lungs clear with good breath sounds bilaterally  Heart:  Normal heart sounds and rate  History & Physical reviewed:  History and physical reviewed and no updates needed  Statement of review:  I have reviewed the lab findings, diagnostic data, medications, and the plan for sedation  this is scheduled staged pci  Pt was again consented for this, risk, benefit, indication for procedure and he wishes to proceed

## 2021-11-30 NOTE — PROGRESS NOTES
Report to HILARY Lara. Pt to be on bedrest for 2-3 hours more after having Femstop placed per Dr. Sexton.

## 2021-11-30 NOTE — PROGRESS NOTES
Reassumed care of pt. Right groin site continues to slowly ooze with small hematoma at site. Dr. Sexton to be informed.

## 2021-11-30 NOTE — Clinical Note
The first balloon was inserted into the left anterior descending.Max pressure = 14 yue. Total duration = 45 seconds.     Max pressure = 14 yue. Total duration = 20 seconds.    Balloon reinflated a second time: Max pressure = 14 yue. Total duration = 20 seconds.

## 2021-12-01 ENCOUNTER — TELEPHONE (OUTPATIENT)
Dept: CARDIOLOGY | Facility: CLINIC | Age: 81
End: 2021-12-01
Payer: COMMERCIAL

## 2021-12-01 NOTE — TELEPHONE ENCOUNTER
Spoke with patient post discharge from care suites after an angioplasty and stenting.    ANGIOGRAM   1. Planned staged PCI of the LAD.  The distal LAD had a more pronounced lesion than previously appreciated.  Rotational atherectomy using 1.5 mm Rota brigid of the proximal to distal LAD.  Successful OCT guided deployment of a 2.5 x 38 mm Synergy TYRONE in the mid to distal LAD and successful deployment of a 3.0 x 38 mm Synergy TYRONE in the proximal to mid LAD.  The stents were postdilated up to 3.2 mm proximally.     2.  The recently placed OM stent remains widely patent.  3.  Mild to moderate RCA disease, unchanged from prior.    Access was via the rt femoral artery. Some oozing post procedure and femstop had to be replaced and area injected with lidocaine with epi.       Phone number listed goes to granddaughter Maryse who answered the phone.  She had been with pt this am before going to work and he was doing well.  No complaints and groin site seemed fine. He has meds at home and they are aware of visit 12/9 with Rose Sanchez PA-C.      Given Sherita Trejo RN phone number, [phone], to call back with questions or concerns today.     Sharyn Meza PA-C 12/1/2021 8:55 AM

## 2021-12-01 NOTE — PROGRESS NOTES
Care Suites Discharge Nursing Note    Patient Information  Name: Kostas Begum  Age: 81 year old    Discharge Education:  Discharge instructions reviewed: Yes, AVS reviewed with patient and his son Kevin.  Additional education/resources provided: Contrast sheet and stent cards given.  Patient/patient representative verbalizes understanding: Yes  Patient discharging on new medications: No  Medication education completed: N/A    Discharge Plans:   Discharge location: home  Discharge ride contacted: Yes  Approximate discharge time: 1830    Discharge Criteria:  Discharge criteria met and vital signs stable: Yes, VSS. (R) groin stable. IV and monitor dc'd.    Patient Belongs:  Patient belongings returned to patient: Yes    Samantha Avalos RN

## 2021-12-03 LAB
ATRIAL RATE - MUSE: 66 BPM
ATRIAL RATE - MUSE: 80 BPM
DIASTOLIC BLOOD PRESSURE - MUSE: NORMAL MMHG
DIASTOLIC BLOOD PRESSURE - MUSE: NORMAL MMHG
INTERPRETATION ECG - MUSE: NORMAL
INTERPRETATION ECG - MUSE: NORMAL
P AXIS - MUSE: 61 DEGREES
P AXIS - MUSE: 67 DEGREES
PR INTERVAL - MUSE: 210 MS
PR INTERVAL - MUSE: 248 MS
QRS DURATION - MUSE: 100 MS
QRS DURATION - MUSE: 96 MS
QT - MUSE: 404 MS
QT - MUSE: 442 MS
QTC - MUSE: 463 MS
QTC - MUSE: 465 MS
R AXIS - MUSE: 54 DEGREES
R AXIS - MUSE: 63 DEGREES
SYSTOLIC BLOOD PRESSURE - MUSE: NORMAL MMHG
SYSTOLIC BLOOD PRESSURE - MUSE: NORMAL MMHG
T AXIS - MUSE: 61 DEGREES
T AXIS - MUSE: 63 DEGREES
VENTRICULAR RATE- MUSE: 66 BPM
VENTRICULAR RATE- MUSE: 80 BPM

## 2021-12-06 ENCOUNTER — HOSPITAL ENCOUNTER (OUTPATIENT)
Dept: CARDIAC REHAB | Facility: CLINIC | Age: 81
End: 2021-12-06
Attending: STUDENT IN AN ORGANIZED HEALTH CARE EDUCATION/TRAINING PROGRAM
Payer: COMMERCIAL

## 2021-12-06 PROCEDURE — 93798 PHYS/QHP OP CAR RHAB W/ECG: CPT | Performed by: OCCUPATIONAL THERAPIST

## 2021-12-07 DIAGNOSIS — D72.829 LEUKOCYTOSIS: Primary | ICD-10-CM

## 2021-12-08 ENCOUNTER — HOSPITAL ENCOUNTER (OUTPATIENT)
Dept: CARDIAC REHAB | Facility: CLINIC | Age: 81
End: 2021-12-08
Attending: STUDENT IN AN ORGANIZED HEALTH CARE EDUCATION/TRAINING PROGRAM
Payer: COMMERCIAL

## 2021-12-08 PROCEDURE — 93798 PHYS/QHP OP CAR RHAB W/ECG: CPT | Performed by: REHABILITATION PRACTITIONER

## 2021-12-09 ENCOUNTER — LAB (OUTPATIENT)
Dept: LAB | Facility: CLINIC | Age: 81
End: 2021-12-09
Payer: COMMERCIAL

## 2021-12-09 ENCOUNTER — OFFICE VISIT (OUTPATIENT)
Dept: CARDIOLOGY | Facility: CLINIC | Age: 81
End: 2021-12-09
Attending: PHYSICIAN ASSISTANT
Payer: COMMERCIAL

## 2021-12-09 VITALS
DIASTOLIC BLOOD PRESSURE: 78 MMHG | SYSTOLIC BLOOD PRESSURE: 121 MMHG | BODY MASS INDEX: 29.86 KG/M2 | WEIGHT: 185 LBS | HEART RATE: 103 BPM | OXYGEN SATURATION: 99 %

## 2021-12-09 DIAGNOSIS — I10 BENIGN ESSENTIAL HYPERTENSION: ICD-10-CM

## 2021-12-09 DIAGNOSIS — I25.10 CORONARY ARTERY DISEASE INVOLVING NATIVE CORONARY ARTERY OF NATIVE HEART WITHOUT ANGINA PECTORIS: ICD-10-CM

## 2021-12-09 DIAGNOSIS — E78.5 HYPERLIPIDEMIA LDL GOAL <70: Primary | ICD-10-CM

## 2021-12-09 DIAGNOSIS — D72.829 LEUKOCYTOSIS: ICD-10-CM

## 2021-12-09 LAB
ERYTHROCYTE [DISTWIDTH] IN BLOOD BY AUTOMATED COUNT: 20.7 % (ref 10–15)
HCT VFR BLD AUTO: 33.6 % (ref 40–53)
HGB BLD-MCNC: 10.1 G/DL (ref 13.3–17.7)
MCH RBC QN AUTO: 23.9 PG (ref 26.5–33)
MCHC RBC AUTO-ENTMCNC: 30.1 G/DL (ref 31.5–36.5)
MCV RBC AUTO: 79 FL (ref 78–100)
PLATELET # BLD AUTO: 688 10E3/UL (ref 150–450)
RBC # BLD AUTO: 4.23 10E6/UL (ref 4.4–5.9)
WBC # BLD AUTO: 12.2 10E3/UL (ref 4–11)

## 2021-12-09 PROCEDURE — 99214 OFFICE O/P EST MOD 30 MIN: CPT | Performed by: PHYSICIAN ASSISTANT

## 2021-12-09 PROCEDURE — 36415 COLL VENOUS BLD VENIPUNCTURE: CPT | Performed by: PHYSICIAN ASSISTANT

## 2021-12-09 PROCEDURE — 85027 COMPLETE CBC AUTOMATED: CPT | Performed by: PHYSICIAN ASSISTANT

## 2021-12-09 NOTE — LETTER
12/9/2021    ANGELA FARMER [facility] [address]  [facility] [address]    RE: Kostas Begum       Dear Colleague,     I had the pleasure of seeing Son BETO Begum in the [facility].  Primary Cardiologist: Dr. Otero    Reason for Visit: Post Angiogram Follow up    History of Present Illness:   Son is a pleasant 81 year old male with past medical history notable for:     #  CAD  -- recent hx of NSTEMIwith PCI to 99% OM1 in 10/2021  -- has residual 95% mid LAD and 70% distal LAD lesions noted with plans for outpatient staged PCI. He is now s/p PCI of these remaining lesions.  -- On DAPT with Brilinta.     # T2DM  -- HA1c 7.9%  -- on metformin    # HTN  -- at goal    # Mild anemia  -- felt to be likely thalassemia   -- follows with heme/onc    # Mild Leukocytosis   -- given sinus tachycardia- he was sent to ED previously but cultures were neg  -- follows with heme/onc; it has been stable    Son returns to clinic today by himself.  He was Persian  speaker phone.  He reports feeling much better since his stage PCI.  He denies recurrent chest discomfort.  His recent CBC shows continued anemia and mild leukocytosis.  This has been worked up recently by heme-onc.  He denies any bleeding issues on dual antiplatelet therapy.  His blood pressure is under much better control.  His pulse is slightly elevated at 103 bpm.  This also has been evaluated in the past and he has been noted to be in sinus tachycardia only.    Assessment and Plan:  Son is a pleasant 81 year old male with past medical history notable for:     #  CAD  -- recent hx of NSTEMIwith PCI to 99% OM1 in 10/2021  -- has residual 95% mid LAD and 70% distal LAD lesions noted with plans for outpatient staged PCI. He is now s/p PCI of these remaining lesions.  -- On DAPT with Brilinta.     # T2DM  -- HA1c 7.9%  -- on metformin    # HTN  -- at goal    # Mild anemia  -- felt to be likely thalassemia   -- follows with  heme/onc    # Mild Leukocytosis   -- given sinus tachycardia- he was sent to ED previously but cultures were neg  -- follows with heme/onc; it has been stable    He appears to be well from a cardiac standpoint.  He had PCI of all of the significant lesions in his coronary tree.  He denies recurrent angina.  He has plans to follow-up with hematology.  He has no shortness of breath, PND, peripheral edema, abdominal distention.  I have asked that he returns to clinic in 2 to 3 months for follow-up with Dr. Otero.    This note was completed in part using Dragon voice recognition software. Although reviewed after completion, some word and grammatical errors may occur.    Orders this Visit:  No orders of the defined types were placed in this encounter.    No orders of the defined types were placed in this encounter.    There are no discontinued medications.      Encounter Diagnosis   Name Primary?     Coronary artery disease involving native coronary artery of native heart without angina pectoris        CURRENT MEDICATIONS:  Current Outpatient Medications   Medication Sig Dispense Refill     aspirin (ASA) 81 MG EC tablet Take 1 tablet (81 mg) by mouth daily Start tomorrow. 30 tablet 3     aspirin (ASA) 81 MG EC tablet Take 1 tablet (81 mg) by mouth daily Start tomorrow. 30 tablet 3     atorvastatin (LIPITOR) 40 MG tablet Take 40 mg by mouth daily       chlorthalidone (HYGROTON) 25 MG tablet Take 0.5 tablets (12.5 mg) by mouth daily       cloNIDine (CATAPRES) 0.1 MG tablet Take 0.1 mg by mouth 2 times daily       glipiZIDE (GLUCOTROL XL) 2.5 MG 24 hr tablet Take 5 mg by mouth daily (with lunch)       irbesartan (AVAPRO) 300 MG tablet Take 300 mg by mouth daily       metFORMIN (GLUCOPHAGE-XR) 500 MG 24 hr tablet Take 1,000 mg by mouth 2 times daily (with meals)       metoprolol succinate ER (TOPROL-XL) 50 MG 24 hr tablet Take 1 tablet (50 mg) by mouth 2 times daily       nitroGLYcerin (NITROSTAT) 0.3 MG sublingual tablet  For chest pain place 1 tablet under the tongue every 5 minutes for 3 doses. If symptoms persist 5 minutes after 1st dose call 911. 10 tablet 0     ticagrelor (BRILINTA) 90 MG tablet Take 1 tablet (90 mg) by mouth every 12 hours 60 tablet 11     Vitamin D3 (CHOLECALCIFEROL) 25 mcg (1000 units) tablet Take 1 tablet by mouth daily         ALLERGIES     Allergies   Allergen Reactions     Zaira-[place]      Prinivil [Lisinopril]        PAST MEDICAL HISTORY:  Past Medical History:   Diagnosis Date     Diabetes (H)      Hypertension      NSTEMI (non-ST elevated myocardial infarction) (H)        PAST SURGICAL HISTORY:  Past Surgical History:   Procedure Laterality Date     CV CORONARY ANGIOGRAM N/A 10/21/2021    Procedure: Coronary Angiogram;  Surgeon: Fuad Sexton MD;  Location: [facility] CARDIAC CATH LAB     CV CORONARY ANGIOGRAM N/A 11/30/2021    Procedure: Coronary Angiogram with percutaneous intervention;  Surgeon: Fuad Sexton MD;  Location: [facility] CARDIAC CATH LAB     CV FEMORAL ANGIOGRAM N/A 10/21/2021    Procedure: Femoral Angiogram;  Surgeon: Fuad Sexton MD;  Location: [facility] CARDIAC CATH LAB     CV OPTICAL COHERENCE TOMOGRAPHY N/A 11/30/2021    Procedure: Optical Coherence Tomography;  Surgeon: Fuad Sexton MD;  Location: [facility] CARDIAC CATH LAB     CV PCI ANGIOPLASTY N/A 10/21/2021    Procedure: Percutaneous Transluminal Angioplasty;  Surgeon: Fuad Sexton MD;  Location: [facility] CARDIAC CATH LAB     CV PCI ATHERECTOMY ORBITAL N/A 11/30/2021    Procedure: Percutaneous Coronary Intervention Atherectomy Rotational;  Surgeon: Fuad Sexton MD;  Location: [facility] CARDIAC CATH LAB     CV PCI STENT DRUG ELUTING N/A 11/30/2021    Procedure: Percutaneous Coronary Intervention Stent Drug Eluting;  Surgeon: Fuad Sexton MD;  Location: [facility] CARDIAC CATH LAB       FAMILY HISTORY:  History reviewed. No pertinent family history.    SOCIAL HISTORY:  Social History      Socioeconomic History     Marital status:      Spouse name: None     Number of children: None     Years of education: None     Highest education level: None   Occupational History     None   Tobacco Use     Smoking status: Former Smoker     Quit date: 5/3/1979     Years since quittin.6     Smokeless tobacco: Never Used   Substance and Sexual Activity     Alcohol use: Not Currently     Drug use: Never     Sexual activity: Not Currently   Other Topics Concern     Parent/sibling w/ CABG, MI or angioplasty before 65F 55M? Not Asked   Social History Narrative     None     Social Determinants of Health     Financial Resource Strain: Not on file   Food Insecurity: Not on file   Transportation Needs: Not on file   Physical Activity: Not on file   Stress: Not on file   Social Connections: Not on file   Intimate Partner Violence: Not on file   Housing Stability: Not on file       Review of Systems:  Skin:  Negative     Eyes:  Negative    ENT:  Negative    Respiratory:  Negative    Cardiovascular:  Negative    Gastroenterology: Positive for heartburn  Genitourinary:  Negative    Musculoskeletal:  Negative    Neurologic:  Negative    Psychiatric:  Negative    Heme/Lymph/Imm:  Positive for allergies  Endocrine:  Positive for diabetes    Physical Exam:  Vitals: /78   Pulse 103   Wt 83.9 kg (185 lb)   SpO2 99%   BMI 29.86 kg/m       GEN:  NAD  NECK: No JVD  C/V:  Regular rate and rhythm, no murmur, rub or gallop.  RESP: Clear to auscultation bilaterally without wheezing, rales, or rhonchi.  GI: Abdomen soft, nontender, nondistended.   EXTREM: No pitting LE edema.   NEURO: Alert and oriented, cooperative. No obvious focal deficits.   PSYCH: Normal affect.  SKIN: Warm and dry.       Recent Lab Results:  LIPID RESULTS:  Lab Results   Component Value Date    CHOL 90 2021    HDL 37 (L) 2021    LDL 36 2021    TRIG 86 2021       LIVER ENZYME RESULTS:  Lab Results   Component Value Date     AST 22 11/05/2021     (H) 11/27/2020    ALT 41 11/05/2021     (H) 11/27/2020       CBC RESULTS:  Lab Results   Component Value Date    WBC 12.2 (H) 12/09/2021    WBC 27.9 (H) 11/27/2020    RBC 4.23 (L) 12/09/2021    RBC 4.24 (L) 11/27/2020    HGB 10.1 (L) 12/09/2021    HGB 10.1 (L) 11/27/2020    HCT 33.6 (L) 12/09/2021    HCT 31.5 (L) 11/27/2020    MCV 79 12/09/2021    MCV 74 (L) 11/27/2020    MCH 23.9 (L) 12/09/2021    MCH 23.8 (L) 11/27/2020    MCHC 30.1 (L) 12/09/2021    MCHC 32.1 11/27/2020    RDW 20.7 (H) 12/09/2021    RDW 16.5 (H) 11/27/2020     (H) 12/09/2021     (H) 11/27/2020       BMP RESULTS:  Lab Results   Component Value Date     11/30/2021     11/27/2020    POTASSIUM 4.1 11/30/2021    POTASSIUM 3.3 (L) 11/27/2020    CHLORIDE 110 (H) 11/30/2021    CHLORIDE 96 11/27/2020    CO2 27 11/30/2021    CO2 21 11/27/2020    ANIONGAP 5 11/30/2021    ANIONGAP 17 (H) 11/27/2020     (H) 11/30/2021     (H) 11/27/2020    BUN 14 11/30/2021    BUN 37 (H) 11/27/2020    CR 0.95 11/30/2021    CR 1.34 (H) 11/27/2020    GFRESTIMATED 76 11/30/2021    GFRESTIMATED 50 (L) 11/27/2020    GFRESTBLACK 58 (L) 11/27/2020    MÓNICA 8.5 11/30/2021    MÓNICA 8.3 (L) 11/27/2020        A1C RESULTS:  Lab Results   Component Value Date    A1C 7.9 (H) 10/20/2021       INR RESULTS:  Lab Results   Component Value Date    INR 0.91 05/11/2009           Rose Saucedo PA-C   December 9, 2021     Thank you for allowing me to participate in the care of your patient.      Sincerely,     Rose Saucedo PA-C     [facility]  cc:   Norah Wick PA-C  [address] [facility] [address] [place] [id number]

## 2021-12-09 NOTE — PATIENT INSTRUCTIONS
Today's Plan:   1) Continue with rehab.   2) Follow up with Dr. Otero in 3 months.   3) Continue with aspirin and brilinta (two blood thinners).     If you have questions or concerns please call my nurse team at [phone].     Scheduling phone number: [phone].  Reminder: Please bring in all current medications, over the counter supplements and vitamin bottles to your next appointment.    It was a pleasure seeing you today!     Rose Saucedo PA-C  12/9/2021

## 2021-12-09 NOTE — PROGRESS NOTES
Primary Cardiologist: Dr. Otero    Reason for Visit: Post Angiogram Follow up    History of Present Illness:   Son is a pleasant 81 year old male with past medical history notable for:     #  CAD  -- recent hx of NSTEMIwith PCI to 99% OM1 in 10/2021  -- has residual 95% mid LAD and 70% distal LAD lesions noted with plans for outpatient staged PCI. He is now s/p PCI of these remaining lesions.  -- On DAPT with Brilinta.     # T2DM  -- HA1c 7.9%  -- on metformin    # HTN  -- at goal    # Mild anemia  -- felt to be likely thalassemia   -- follows with heme/onc    # Mild Leukocytosis   -- given sinus tachycardia- he was sent to ED previously but cultures were neg  -- follows with heme/onc; it has been stable    Son returns to clinic today by himself.  He was English  speaker phone.  He reports feeling much better since his stage PCI.  He denies recurrent chest discomfort.  His recent CBC shows continued anemia and mild leukocytosis.  This has been worked up recently by heme-onc.  He denies any bleeding issues on dual antiplatelet therapy.  His blood pressure is under much better control.  His pulse is slightly elevated at 103 bpm.  This also has been evaluated in the past and he has been noted to be in sinus tachycardia only.    Assessment and Plan:  Son is a pleasant 81 year old male with past medical history notable for:     #  CAD  -- recent hx of NSTEMIwith PCI to 99% OM1 in 10/2021  -- has residual 95% mid LAD and 70% distal LAD lesions noted with plans for outpatient staged PCI. He is now s/p PCI of these remaining lesions.  -- On DAPT with Brilinta.     # T2DM  -- HA1c 7.9%  -- on metformin    # HTN  -- at goal    # Mild anemia  -- felt to be likely thalassemia   -- follows with heme/onc    # Mild Leukocytosis   -- given sinus tachycardia- he was sent to ED previously but cultures were neg  -- follows with heme/onc; it has been stable    He appears to be well from a cardiac standpoint.  He had PCI  of all of the significant lesions in his coronary tree.  He denies recurrent angina.  He has plans to follow-up with hematology.  He has no shortness of breath, PND, peripheral edema, abdominal distention.  I have asked that he returns to clinic in 2 to 3 months for follow-up with Dr. Otero.    This note was completed in part using Dragon voice recognition software. Although reviewed after completion, some word and grammatical errors may occur.    Orders this Visit:  No orders of the defined types were placed in this encounter.    No orders of the defined types were placed in this encounter.    There are no discontinued medications.      Encounter Diagnosis   Name Primary?     Coronary artery disease involving native coronary artery of native heart without angina pectoris        CURRENT MEDICATIONS:  Current Outpatient Medications   Medication Sig Dispense Refill     aspirin (ASA) 81 MG EC tablet Take 1 tablet (81 mg) by mouth daily Start tomorrow. 30 tablet 3     aspirin (ASA) 81 MG EC tablet Take 1 tablet (81 mg) by mouth daily Start tomorrow. 30 tablet 3     atorvastatin (LIPITOR) 40 MG tablet Take 40 mg by mouth daily       chlorthalidone (HYGROTON) 25 MG tablet Take 0.5 tablets (12.5 mg) by mouth daily       cloNIDine (CATAPRES) 0.1 MG tablet Take 0.1 mg by mouth 2 times daily       glipiZIDE (GLUCOTROL XL) 2.5 MG 24 hr tablet Take 5 mg by mouth daily (with lunch)       irbesartan (AVAPRO) 300 MG tablet Take 300 mg by mouth daily       metFORMIN (GLUCOPHAGE-XR) 500 MG 24 hr tablet Take 1,000 mg by mouth 2 times daily (with meals)       metoprolol succinate ER (TOPROL-XL) 50 MG 24 hr tablet Take 1 tablet (50 mg) by mouth 2 times daily       nitroGLYcerin (NITROSTAT) 0.3 MG sublingual tablet For chest pain place 1 tablet under the tongue every 5 minutes for 3 doses. If symptoms persist 5 minutes after 1st dose call 911. 10 tablet 0     ticagrelor (BRILINTA) 90 MG tablet Take 1 tablet (90 mg) by mouth every 12  hours 60 tablet 11     Vitamin D3 (CHOLECALCIFEROL) 25 mcg (1000 units) tablet Take 1 tablet by mouth daily         ALLERGIES     Allergies   Allergen Reactions     Zaira-[place]      Prinivil [Lisinopril]        PAST MEDICAL HISTORY:  Past Medical History:   Diagnosis Date     Diabetes (H)      Hypertension      NSTEMI (non-ST elevated myocardial infarction) (H)        PAST SURGICAL HISTORY:  Past Surgical History:   Procedure Laterality Date     CV CORONARY ANGIOGRAM N/A 10/21/2021    Procedure: Coronary Angiogram;  Surgeon: Fuad Sexton MD;  Location: [facility] CARDIAC CATH LAB     CV CORONARY ANGIOGRAM N/A 11/30/2021    Procedure: Coronary Angiogram with percutaneous intervention;  Surgeon: Fuad Sexton MD;  Location: [facility] CARDIAC CATH LAB     CV FEMORAL ANGIOGRAM N/A 10/21/2021    Procedure: Femoral Angiogram;  Surgeon: Fuad Sexton MD;  Location: [facility] CARDIAC CATH LAB     CV OPTICAL COHERENCE TOMOGRAPHY N/A 11/30/2021    Procedure: Optical Coherence Tomography;  Surgeon: Fuad Sexton MD;  Location: [facility] CARDIAC CATH LAB     CV PCI ANGIOPLASTY N/A 10/21/2021    Procedure: Percutaneous Transluminal Angioplasty;  Surgeon: Fuad Sexton MD;  Location: [facility] CARDIAC CATH LAB     CV PCI ATHERECTOMY ORBITAL N/A 11/30/2021    Procedure: Percutaneous Coronary Intervention Atherectomy Rotational;  Surgeon: Fuad Sexton MD;  Location: [facility] CARDIAC CATH LAB     CV PCI STENT DRUG ELUTING N/A 11/30/2021    Procedure: Percutaneous Coronary Intervention Stent Drug Eluting;  Surgeon: Fuad Sexton MD;  Location: [facility] CARDIAC CATH LAB       FAMILY HISTORY:  History reviewed. No pertinent family history.    SOCIAL HISTORY:  Social History     Socioeconomic History     Marital status:      Spouse name: None     Number of children: None     Years of education: None     Highest education level: None   Occupational History     None   Tobacco Use     Smoking status:  Former Smoker     Quit date: 5/3/1979     Years since quittin.6     Smokeless tobacco: Never Used   Substance and Sexual Activity     Alcohol use: Not Currently     Drug use: Never     Sexual activity: Not Currently   Other Topics Concern     Parent/sibling w/ CABG, MI or angioplasty before 65F 55M? Not Asked   Social History Narrative     None     Social Determinants of Health     Financial Resource Strain: Not on file   Food Insecurity: Not on file   Transportation Needs: Not on file   Physical Activity: Not on file   Stress: Not on file   Social Connections: Not on file   Intimate Partner Violence: Not on file   Housing Stability: Not on file       Review of Systems:  Skin:  Negative     Eyes:  Negative    ENT:  Negative    Respiratory:  Negative    Cardiovascular:  Negative    Gastroenterology: Positive for heartburn  Genitourinary:  Negative    Musculoskeletal:  Negative    Neurologic:  Negative    Psychiatric:  Negative    Heme/Lymph/Imm:  Positive for allergies  Endocrine:  Positive for diabetes    Physical Exam:  Vitals: /78   Pulse 103   Wt 83.9 kg (185 lb)   SpO2 99%   BMI 29.86 kg/m       GEN:  NAD  NECK: No JVD  C/V:  Regular rate and rhythm, no murmur, rub or gallop.  RESP: Clear to auscultation bilaterally without wheezing, rales, or rhonchi.  GI: Abdomen soft, nontender, nondistended.   EXTREM: No pitting LE edema.   NEURO: Alert and oriented, cooperative. No obvious focal deficits.   PSYCH: Normal affect.  SKIN: Warm and dry.       Recent Lab Results:  LIPID RESULTS:  Lab Results   Component Value Date    CHOL 90 2021    HDL 37 (L) 2021    LDL 36 2021    TRIG 86 2021       LIVER ENZYME RESULTS:  Lab Results   Component Value Date    AST 22 2021     (H) 2020    ALT 41 2021     (H) 2020       CBC RESULTS:  Lab Results   Component Value Date    WBC 12.2 (H) 2021    WBC 27.9 (H) 2020    RBC 4.23 (L) 2021    RBC  4.24 (L) 11/27/2020    HGB 10.1 (L) 12/09/2021    HGB 10.1 (L) 11/27/2020    HCT 33.6 (L) 12/09/2021    HCT 31.5 (L) 11/27/2020    MCV 79 12/09/2021    MCV 74 (L) 11/27/2020    MCH 23.9 (L) 12/09/2021    MCH 23.8 (L) 11/27/2020    MCHC 30.1 (L) 12/09/2021    MCHC 32.1 11/27/2020    RDW 20.7 (H) 12/09/2021    RDW 16.5 (H) 11/27/2020     (H) 12/09/2021     (H) 11/27/2020       BMP RESULTS:  Lab Results   Component Value Date     11/30/2021     11/27/2020    POTASSIUM 4.1 11/30/2021    POTASSIUM 3.3 (L) 11/27/2020    CHLORIDE 110 (H) 11/30/2021    CHLORIDE 96 11/27/2020    CO2 27 11/30/2021    CO2 21 11/27/2020    ANIONGAP 5 11/30/2021    ANIONGAP 17 (H) 11/27/2020     (H) 11/30/2021     (H) 11/27/2020    BUN 14 11/30/2021    BUN 37 (H) 11/27/2020    CR 0.95 11/30/2021    CR 1.34 (H) 11/27/2020    GFRESTIMATED 76 11/30/2021    GFRESTIMATED 50 (L) 11/27/2020    GFRESTBLACK 58 (L) 11/27/2020    MÓNICA 8.5 11/30/2021    MÓNICA 8.3 (L) 11/27/2020        A1C RESULTS:  Lab Results   Component Value Date    A1C 7.9 (H) 10/20/2021       INR RESULTS:  Lab Results   Component Value Date    INR 0.91 05/11/2009           Rose Saucedo PA-C   December 9, 2021

## 2021-12-10 ENCOUNTER — HOSPITAL ENCOUNTER (OUTPATIENT)
Dept: CARDIAC REHAB | Facility: CLINIC | Age: 81
End: 2021-12-10
Attending: STUDENT IN AN ORGANIZED HEALTH CARE EDUCATION/TRAINING PROGRAM
Payer: COMMERCIAL

## 2021-12-10 PROCEDURE — 93798 PHYS/QHP OP CAR RHAB W/ECG: CPT

## 2021-12-13 ENCOUNTER — HOSPITAL ENCOUNTER (OUTPATIENT)
Dept: CARDIAC REHAB | Facility: CLINIC | Age: 81
End: 2021-12-13
Attending: STUDENT IN AN ORGANIZED HEALTH CARE EDUCATION/TRAINING PROGRAM
Payer: COMMERCIAL

## 2021-12-13 PROCEDURE — 93798 PHYS/QHP OP CAR RHAB W/ECG: CPT

## 2021-12-15 ENCOUNTER — HOSPITAL ENCOUNTER (OUTPATIENT)
Dept: CARDIAC REHAB | Facility: CLINIC | Age: 81
End: 2021-12-15
Attending: STUDENT IN AN ORGANIZED HEALTH CARE EDUCATION/TRAINING PROGRAM
Payer: COMMERCIAL

## 2021-12-15 PROCEDURE — 93798 PHYS/QHP OP CAR RHAB W/ECG: CPT | Performed by: REHABILITATION PRACTITIONER

## 2021-12-17 ENCOUNTER — HOSPITAL ENCOUNTER (OUTPATIENT)
Dept: CARDIAC REHAB | Facility: CLINIC | Age: 81
End: 2021-12-17
Attending: STUDENT IN AN ORGANIZED HEALTH CARE EDUCATION/TRAINING PROGRAM
Payer: COMMERCIAL

## 2021-12-17 PROCEDURE — 93798 PHYS/QHP OP CAR RHAB W/ECG: CPT

## 2021-12-20 ENCOUNTER — HOSPITAL ENCOUNTER (OUTPATIENT)
Dept: CARDIAC REHAB | Facility: CLINIC | Age: 81
End: 2021-12-20
Attending: STUDENT IN AN ORGANIZED HEALTH CARE EDUCATION/TRAINING PROGRAM
Payer: COMMERCIAL

## 2021-12-20 PROCEDURE — 93798 PHYS/QHP OP CAR RHAB W/ECG: CPT | Performed by: OCCUPATIONAL THERAPIST

## 2021-12-22 ENCOUNTER — HOSPITAL ENCOUNTER (OUTPATIENT)
Dept: CARDIAC REHAB | Facility: CLINIC | Age: 81
End: 2021-12-22
Attending: STUDENT IN AN ORGANIZED HEALTH CARE EDUCATION/TRAINING PROGRAM
Payer: COMMERCIAL

## 2021-12-22 PROCEDURE — 93798 PHYS/QHP OP CAR RHAB W/ECG: CPT | Performed by: REHABILITATION PRACTITIONER

## 2022-01-17 ENCOUNTER — APPOINTMENT (OUTPATIENT)
Dept: CT IMAGING | Facility: CLINIC | Age: 82
DRG: 920 | End: 2022-01-17
Attending: EMERGENCY MEDICINE
Payer: COMMERCIAL

## 2022-01-17 ENCOUNTER — HOSPITAL ENCOUNTER (INPATIENT)
Facility: CLINIC | Age: 82
LOS: 3 days | Discharge: HOME | DRG: 920 | End: 2022-01-20
Attending: EMERGENCY MEDICINE | Admitting: INTERNAL MEDICINE
Payer: COMMERCIAL

## 2022-01-17 DIAGNOSIS — E13.9 OTHER SPECIFIED DIABETES MELLITUS WITHOUT COMPLICATION, WITHOUT LONG-TERM CURRENT USE OF INSULIN (H): ICD-10-CM

## 2022-01-17 DIAGNOSIS — T14.8XXA INFECTION OF WOUND HEMATOMA: ICD-10-CM

## 2022-01-17 DIAGNOSIS — L08.9 INFECTION OF WOUND HEMATOMA: ICD-10-CM

## 2022-01-17 LAB
ANION GAP SERPL CALCULATED.3IONS-SCNC: 7 MMOL/L (ref 3–14)
BASOPHILS # BLD AUTO: 0.1 10E3/UL (ref 0–0.2)
BASOPHILS NFR BLD AUTO: 0 %
BUN SERPL-MCNC: 18 MG/DL (ref 7–30)
CALCIUM SERPL-MCNC: 8.4 MG/DL (ref 8.5–10.1)
CHLORIDE BLD-SCNC: 105 MMOL/L (ref 94–109)
CO2 SERPL-SCNC: 27 MMOL/L (ref 20–32)
CREAT SERPL-MCNC: 0.86 MG/DL (ref 0.66–1.25)
EOSINOPHIL # BLD AUTO: 0.1 10E3/UL (ref 0–0.7)
EOSINOPHIL NFR BLD AUTO: 1 %
ERYTHROCYTE [DISTWIDTH] IN BLOOD BY AUTOMATED COUNT: 16.6 % (ref 10–15)
GFR SERPL CREATININE-BSD FRML MDRD: 88 ML/MIN/1.73M2
GLUCOSE BLD-MCNC: 131 MG/DL (ref 70–99)
GLUCOSE BLDC GLUCOMTR-MCNC: 158 MG/DL (ref 70–99)
HBA1C MFR BLD: 7.4 % (ref 0–5.6)
HCO3 BLDV-SCNC: 28 MMOL/L (ref 21–28)
HCT VFR BLD AUTO: 33.1 % (ref 40–53)
HGB BLD-MCNC: 10 G/DL (ref 13.3–17.7)
HOLD SPECIMEN: NORMAL
HOLD SPECIMEN: NORMAL
IMM GRANULOCYTES # BLD: 0.1 10E3/UL
IMM GRANULOCYTES NFR BLD: 1 %
LACTATE BLD-SCNC: 1.7 MMOL/L
LYMPHOCYTES # BLD AUTO: 3 10E3/UL (ref 0.8–5.3)
LYMPHOCYTES NFR BLD AUTO: 19 %
MCH RBC QN AUTO: 24.1 PG (ref 26.5–33)
MCHC RBC AUTO-ENTMCNC: 30.2 G/DL (ref 31.5–36.5)
MCV RBC AUTO: 80 FL (ref 78–100)
MONOCYTES # BLD AUTO: 1.1 10E3/UL (ref 0–1.3)
MONOCYTES NFR BLD AUTO: 7 %
NEUTROPHILS # BLD AUTO: 11.6 10E3/UL (ref 1.6–8.3)
NEUTROPHILS NFR BLD AUTO: 72 %
NRBC # BLD AUTO: 0 10E3/UL
NRBC BLD AUTO-RTO: 0 /100
PCO2 BLDV: 46 MM HG (ref 40–50)
PH BLDV: 7.4 [PH] (ref 7.32–7.43)
PLATELET # BLD AUTO: 503 10E3/UL (ref 150–450)
PO2 BLDV: 20 MM HG (ref 25–47)
POTASSIUM BLD-SCNC: 3.7 MMOL/L (ref 3.4–5.3)
RBC # BLD AUTO: 4.15 10E6/UL (ref 4.4–5.9)
SAO2 % BLDV: 31 % (ref 94–100)
SARS-COV-2 RNA RESP QL NAA+PROBE: NEGATIVE
SODIUM SERPL-SCNC: 139 MMOL/L (ref 133–144)
WBC # BLD AUTO: 16 10E3/UL (ref 4–11)

## 2022-01-17 PROCEDURE — 82310 ASSAY OF CALCIUM: CPT | Performed by: EMERGENCY MEDICINE

## 2022-01-17 PROCEDURE — 120N000001 HC R&B MED SURG/OB

## 2022-01-17 PROCEDURE — 250N000011 HC RX IP 250 OP 636: Performed by: INTERNAL MEDICINE

## 2022-01-17 PROCEDURE — 250N000011 HC RX IP 250 OP 636: Performed by: EMERGENCY MEDICINE

## 2022-01-17 PROCEDURE — 99285 EMERGENCY DEPT VISIT HI MDM: CPT | Mod: 25

## 2022-01-17 PROCEDURE — 36415 COLL VENOUS BLD VENIPUNCTURE: CPT | Performed by: INTERNAL MEDICINE

## 2022-01-17 PROCEDURE — 82803 BLOOD GASES ANY COMBINATION: CPT

## 2022-01-17 PROCEDURE — 250N000013 HC RX MED GY IP 250 OP 250 PS 637: Performed by: INTERNAL MEDICINE

## 2022-01-17 PROCEDURE — 83036 HEMOGLOBIN GLYCOSYLATED A1C: CPT | Performed by: INTERNAL MEDICINE

## 2022-01-17 PROCEDURE — 87635 SARS-COV-2 COVID-19 AMP PRB: CPT | Performed by: EMERGENCY MEDICINE

## 2022-01-17 PROCEDURE — 85025 COMPLETE CBC W/AUTO DIFF WBC: CPT | Performed by: EMERGENCY MEDICINE

## 2022-01-17 PROCEDURE — 96365 THER/PROPH/DIAG IV INF INIT: CPT | Mod: 59

## 2022-01-17 PROCEDURE — 87040 BLOOD CULTURE FOR BACTERIA: CPT | Performed by: EMERGENCY MEDICINE

## 2022-01-17 PROCEDURE — 36415 COLL VENOUS BLD VENIPUNCTURE: CPT | Performed by: EMERGENCY MEDICINE

## 2022-01-17 PROCEDURE — 99223 1ST HOSP IP/OBS HIGH 75: CPT | Mod: AI | Performed by: INTERNAL MEDICINE

## 2022-01-17 PROCEDURE — C9803 HOPD COVID-19 SPEC COLLECT: HCPCS

## 2022-01-17 PROCEDURE — 74176 CT ABD & PELVIS W/O CONTRAST: CPT

## 2022-01-17 RX ORDER — IOPAMIDOL 755 MG/ML
93 INJECTION, SOLUTION INTRAVASCULAR ONCE
Status: DISCONTINUED | OUTPATIENT
Start: 2022-01-17 | End: 2022-01-17 | Stop reason: CLARIF

## 2022-01-17 RX ORDER — IRBESARTAN 150 MG/1
150 TABLET ORAL DAILY
COMMUNITY
Start: 2021-12-21

## 2022-01-17 RX ORDER — SODIUM CHLORIDE 9 MG/ML
INJECTION, SOLUTION INTRAVENOUS CONTINUOUS
Status: DISCONTINUED | OUTPATIENT
Start: 2022-01-17 | End: 2022-01-20 | Stop reason: HOSPADM

## 2022-01-17 RX ORDER — NALOXONE HYDROCHLORIDE 0.4 MG/ML
0.4 INJECTION, SOLUTION INTRAMUSCULAR; INTRAVENOUS; SUBCUTANEOUS
Status: DISCONTINUED | OUTPATIENT
Start: 2022-01-17 | End: 2022-01-20 | Stop reason: HOSPADM

## 2022-01-17 RX ORDER — ACETAMINOPHEN 325 MG/1
650 TABLET ORAL EVERY 6 HOURS PRN
Status: DISCONTINUED | OUTPATIENT
Start: 2022-01-17 | End: 2022-01-20 | Stop reason: HOSPADM

## 2022-01-17 RX ORDER — GLIPIZIDE 10 MG/1
10 TABLET, FILM COATED, EXTENDED RELEASE ORAL 2 TIMES DAILY WITH MEALS
COMMUNITY
Start: 2021-10-27 | End: 2022-01-25

## 2022-01-17 RX ORDER — METOPROLOL SUCCINATE 50 MG/1
50 TABLET, EXTENDED RELEASE ORAL 2 TIMES DAILY
Status: DISCONTINUED | OUTPATIENT
Start: 2022-01-17 | End: 2022-01-20 | Stop reason: HOSPADM

## 2022-01-17 RX ORDER — NITROGLYCERIN 0.4 MG/1
0.4 TABLET SUBLINGUAL EVERY 5 MIN PRN
Status: DISCONTINUED | OUTPATIENT
Start: 2022-01-17 | End: 2022-01-20 | Stop reason: HOSPADM

## 2022-01-17 RX ORDER — LORAZEPAM 0.5 MG/1
0.5 TABLET ORAL EVERY 6 HOURS PRN
COMMUNITY
Start: 2022-01-04 | End: 2022-01-17

## 2022-01-17 RX ORDER — AMOXICILLIN 250 MG
1 CAPSULE ORAL 2 TIMES DAILY
Status: DISCONTINUED | OUTPATIENT
Start: 2022-01-17 | End: 2022-01-20 | Stop reason: HOSPADM

## 2022-01-17 RX ORDER — ACYCLOVIR 400 MG/1
TABLET ORAL
COMMUNITY
Start: 2021-08-13 | End: 2022-01-17

## 2022-01-17 RX ORDER — HYDROMORPHONE HYDROCHLORIDE 2 MG/1
2 TABLET ORAL EVERY 4 HOURS PRN
Status: DISCONTINUED | OUTPATIENT
Start: 2022-01-17 | End: 2022-01-20 | Stop reason: HOSPADM

## 2022-01-17 RX ORDER — DEXTROSE MONOHYDRATE 25 G/50ML
25-50 INJECTION, SOLUTION INTRAVENOUS
Status: DISCONTINUED | OUTPATIENT
Start: 2022-01-17 | End: 2022-01-20 | Stop reason: HOSPADM

## 2022-01-17 RX ORDER — VITAMIN B COMPLEX
25 TABLET ORAL 2 TIMES DAILY
Status: DISCONTINUED | OUTPATIENT
Start: 2022-01-17 | End: 2022-01-20 | Stop reason: HOSPADM

## 2022-01-17 RX ORDER — NALOXONE HYDROCHLORIDE 0.4 MG/ML
0.2 INJECTION, SOLUTION INTRAMUSCULAR; INTRAVENOUS; SUBCUTANEOUS
Status: DISCONTINUED | OUTPATIENT
Start: 2022-01-17 | End: 2022-01-20 | Stop reason: HOSPADM

## 2022-01-17 RX ORDER — AMPICILLIN AND SULBACTAM 2; 1 G/1; G/1
3 INJECTION, POWDER, FOR SOLUTION INTRAMUSCULAR; INTRAVENOUS EVERY 6 HOURS
Status: DISCONTINUED | OUTPATIENT
Start: 2022-01-17 | End: 2022-01-19

## 2022-01-17 RX ORDER — GLIPIZIDE 10 MG/1
10 TABLET, FILM COATED, EXTENDED RELEASE ORAL 2 TIMES DAILY WITH MEALS
Status: DISCONTINUED | OUTPATIENT
Start: 2022-01-17 | End: 2022-01-20 | Stop reason: HOSPADM

## 2022-01-17 RX ORDER — AMOXICILLIN 250 MG
2 CAPSULE ORAL 2 TIMES DAILY
Status: DISCONTINUED | OUTPATIENT
Start: 2022-01-17 | End: 2022-01-20 | Stop reason: HOSPADM

## 2022-01-17 RX ORDER — LIDOCAINE 40 MG/G
CREAM TOPICAL
Status: DISCONTINUED | OUTPATIENT
Start: 2022-01-17 | End: 2022-01-20 | Stop reason: HOSPADM

## 2022-01-17 RX ORDER — CLONIDINE HYDROCHLORIDE 0.1 MG/1
0.1 TABLET ORAL 2 TIMES DAILY
Status: DISCONTINUED | OUTPATIENT
Start: 2022-01-17 | End: 2022-01-20 | Stop reason: HOSPADM

## 2022-01-17 RX ORDER — ACETAMINOPHEN 650 MG/1
650 SUPPOSITORY RECTAL EVERY 6 HOURS PRN
Status: DISCONTINUED | OUTPATIENT
Start: 2022-01-17 | End: 2022-01-20 | Stop reason: HOSPADM

## 2022-01-17 RX ORDER — ASPIRIN 81 MG/1
81 TABLET ORAL DAILY
Status: DISCONTINUED | OUTPATIENT
Start: 2022-01-18 | End: 2022-01-20 | Stop reason: HOSPADM

## 2022-01-17 RX ORDER — NICOTINE POLACRILEX 4 MG
15-30 LOZENGE BUCCAL
Status: DISCONTINUED | OUTPATIENT
Start: 2022-01-17 | End: 2022-01-20 | Stop reason: HOSPADM

## 2022-01-17 RX ORDER — ONDANSETRON 2 MG/ML
4 INJECTION INTRAMUSCULAR; INTRAVENOUS EVERY 6 HOURS PRN
Status: DISCONTINUED | OUTPATIENT
Start: 2022-01-17 | End: 2022-01-20 | Stop reason: HOSPADM

## 2022-01-17 RX ORDER — AMPICILLIN AND SULBACTAM 2; 1 G/1; G/1
3 INJECTION, POWDER, FOR SOLUTION INTRAMUSCULAR; INTRAVENOUS ONCE
Status: COMPLETED | OUTPATIENT
Start: 2022-01-17 | End: 2022-01-17

## 2022-01-17 RX ORDER — ATORVASTATIN CALCIUM 20 MG/1
40 TABLET, FILM COATED ORAL AT BEDTIME
Status: DISCONTINUED | OUTPATIENT
Start: 2022-01-17 | End: 2022-01-20 | Stop reason: HOSPADM

## 2022-01-17 RX ORDER — ONDANSETRON 4 MG/1
4 TABLET, ORALLY DISINTEGRATING ORAL EVERY 6 HOURS PRN
Status: DISCONTINUED | OUTPATIENT
Start: 2022-01-17 | End: 2022-01-20 | Stop reason: HOSPADM

## 2022-01-17 RX ORDER — IRBESARTAN 150 MG/1
150 TABLET ORAL 2 TIMES DAILY
Status: DISCONTINUED | OUTPATIENT
Start: 2022-01-17 | End: 2022-01-20 | Stop reason: HOSPADM

## 2022-01-17 RX ADMIN — CHLORTHALIDONE 12.5 MG: 25 TABLET ORAL at 21:26

## 2022-01-17 RX ADMIN — AMPICILLIN SODIUM AND SULBACTAM SODIUM 3 G: 2; 1 INJECTION, POWDER, FOR SOLUTION INTRAMUSCULAR; INTRAVENOUS at 14:09

## 2022-01-17 RX ADMIN — GLIPIZIDE 10 MG: 10 TABLET, FILM COATED, EXTENDED RELEASE ORAL at 21:26

## 2022-01-17 RX ADMIN — IRBESARTAN 150 MG: 150 TABLET ORAL at 21:26

## 2022-01-17 RX ADMIN — AMPICILLIN SODIUM AND SULBACTAM SODIUM 3 G: 2; 1 INJECTION, POWDER, FOR SOLUTION INTRAMUSCULAR; INTRAVENOUS at 21:29

## 2022-01-17 RX ADMIN — ATORVASTATIN CALCIUM 40 MG: 20 TABLET, FILM COATED ORAL at 21:18

## 2022-01-17 RX ADMIN — METOPROLOL SUCCINATE 50 MG: 50 TABLET, EXTENDED RELEASE ORAL at 21:18

## 2022-01-17 RX ADMIN — CLONIDINE HYDROCHLORIDE 0.1 MG: 0.1 TABLET ORAL at 21:16

## 2022-01-17 RX ADMIN — SENNOSIDES AND DOCUSATE SODIUM 1 TABLET: 50; 8.6 TABLET ORAL at 21:18

## 2022-01-17 RX ADMIN — Medication 25 MCG: at 21:18

## 2022-01-17 RX ADMIN — TICAGRELOR 90 MG: 90 TABLET ORAL at 21:26

## 2022-01-17 ASSESSMENT — ACTIVITIES OF DAILY LIVING (ADL)
ADLS_ACUITY_SCORE: 5
ADLS_ACUITY_SCORE: 7
ADLS_ACUITY_SCORE: 5
ADLS_ACUITY_SCORE: 5
ADLS_ACUITY_SCORE: 7
ADLS_ACUITY_SCORE: 5
ADLS_ACUITY_SCORE: 5

## 2022-01-17 ASSESSMENT — ENCOUNTER SYMPTOMS
WOUND: 1
ABDOMINAL PAIN: 1

## 2022-01-17 NOTE — ED NOTES
[facility]  ED Nurse Handoff Report    ED Chief complaint: Wound Check (Pt presents with pain, swelling, and bleeding froma LLQ skin biopsy site.  Family states biopsy done on 1/12/22.)      ED Diagnosis:   Final diagnoses:   Infection of wound hematoma   Other specified diabetes mellitus without complication, without long-term current use of insulin (H)       Code Status: Full Code    Allergies:   Allergies   Allergen Reactions     Zaira-[place]      Prinivil [Lisinopril]        Patient Story: HPI from Dr Felix  Son BETO Begum is a 81 year old male with a history of type II diabetes and NSTEMI, anticoagulated on Brilinta, who presents with concern for infection. His granddaughter is acting as an . The patient had a skin biopsy performed 5 days ago on the left lower abdomen, and his granddaughter now expresses concern for infection. She notes that the area surrounding the incision is quite bruised and there is bloody drainage from the wound. She also states that the area is very tender. Patient was seen in urgent care earlier this morning, where he had a CBC done (see below) and had a temperature of 100F. He was not given any Tylenol there and is currently afebrile.      Focused Assessment:  Alert calm and cooperative - vitals stable - family present to interpret. Pt not complaining of pain much at all - just does not like the enmanuelng.      Treatments and/or interventions provided: ivf, iv antibiotics admission    Patient's response to treatments and/or interventions: no changes     To be done/followed up on inpatient unit:  monitor dressing to abd and pain and swelling .     Does this patient have any cognitive concerns?: none noted     Activity level - Baseline/Home:  Independent  Activity Level - Current:   Independent    Patient's Preferred language: [place]   Needed?: No    Isolation: None  Infection: Not Applicable  Patient tested for COVID 19 prior to admission:  "YES  Bariatric?: No    Vital Signs:   Vitals:    01/17/22 1024 01/17/22 1400 01/17/22 1403 01/17/22 1430   BP: 123/61 122/69 122/69 116/68   Pulse: 102 84 85 86   Resp: 18 24 16 10   Temp: 98.1  F (36.7  C)      TempSrc: Temporal      SpO2: 100% 100% 100% 99%   Height: 1.676 m (5' 6\")          Cardiac Rhythm:     Was the PSS-3 completed:   Yes  What interventions are required if any?               Family Comments: dtr present and updated.   OBS brochure/video discussed/provided to patient/family: N/A              Name of person given brochure if not patient: none needed               Relationship to patient: none needed     For the majority of the shift this patient's behavior was Green.   Behavioral interventions performed were none needed . .    ED NURSE PHONE NUMBER: [phone]       "

## 2022-01-17 NOTE — PHARMACY-ADMISSION MEDICATION HISTORY
Pharmacy Medication History  Admission medication history interview status for the 1/17/2022  admission is complete. See EPIC admission navigator for prior to admission medications     Location of Interview: Patient room  Medication history sources: Patient, Patient's family/friend (daughter interpreted), Surescripts and Patient's home med list    Significant changes made to the medication list:  Modified vit d to bid, avapro to 150mg bid, glipizide    In the past week, patient estimated taking medication this percent of the time: greater than 90%    Additional medication history information:   Pt does not take Acyclovir or Lorazepam per daughter    Medication reconciliation completed by provider prior to medication history? No    Time spent in this activity: 15 minutes    Prior to Admission medications    Medication Sig Last Dose Taking? Auth Provider   aspirin (ASA) 81 MG EC tablet Take 1 tablet (81 mg) by mouth daily Start tomorrow. 1/17/2022 at am Yes Nicolas Gamez MD   atorvastatin (LIPITOR) 40 MG tablet Take 40 mg by mouth At Bedtime  1/16/2022 at hs Yes Unknown, Entered By History   chlorthalidone (HYGROTON) 25 MG tablet Take 12.5 mg by mouth At Bedtime  1/16/2022 at hs Yes Norah Wick PA-C   cloNIDine (CATAPRES) 0.1 MG tablet Take 0.1 mg by mouth 2 times daily 1/17/2022 at am Yes Unknown, Entered By History   glipiZIDE (GLUCOTROL XL) 10 MG 24 hr tablet Take 10 mg by mouth 2 times daily (with meals) 1/16/2022 at pm Yes Unknown, Entered By History   irbesartan (AVAPRO) 150 MG tablet Take 150 mg by mouth 2 times daily  1/17/2022 at am Yes Unknown, Entered By History   metFORMIN (GLUCOPHAGE-XR) 500 MG 24 hr tablet Take 1,000 mg by mouth 2 times daily (with meals) Takes in the afternoon and evening 1/16/2022 at pm Yes Unknown, Entered By History   metoprolol succinate ER (TOPROL-XL) 50 MG 24 hr tablet Take 1 tablet (50 mg) by mouth 2 times daily 1/17/2022 at am Yes Norah Wick PA-C    nitroGLYcerin (NITROSTAT) 0.3 MG sublingual tablet For chest pain place 1 tablet under the tongue every 5 minutes for 3 doses. If symptoms persist 5 minutes after 1st dose call 911. prn Yes Preston Hinton MD   ticagrelor (BRILINTA) 90 MG tablet Take 1 tablet (90 mg) by mouth every 12 hours 1/17/2022 at am Yes Preston Hinton MD   Vitamin D3 (CHOLECALCIFEROL) 25 mcg (1000 units) tablet Take 1 tablet by mouth 2 times daily  1/17/2022 at am Yes Unknown, Entered By History       The information provided in this note is only as accurate as the sources available at the time of update(s)

## 2022-01-17 NOTE — ED TRIAGE NOTES
Pt presents with pain, swelling, and bleeding froma LLQ skin biopsy site.  Family states biopsy done on 1/12/22.

## 2022-01-17 NOTE — ED NOTES
4x4 gauze placed over steri strips to left abd as it is weeping blood.   Pt has been notified he will be admitted.

## 2022-01-17 NOTE — ED PROVIDER NOTES
"  History   Chief Complaint:  Wound Check (Pt presents with pain, swelling, and bleeding froma LLQ skin biopsy site.  Family states biopsy done on 1/12/22.)       HPI   Son BETO Begum is a 81 year old male with a history of type II diabetes and NSTEMI, anticoagulated on Brilinta, who presents with concern for infection. His granddaughter is acting as an . The patient had a skin biopsy performed 5 days ago on the left lower abdomen, and his granddaughter now expresses concern for infection. She notes that the area surrounding the incision is quite bruised and there is bloody drainage from the wound. She also states that the area is very tender. Patient was seen in urgent care earlier this morning, where he had a CBC done (see below) and had a temperature of 100F. He was not given any Tylenol there and is currently afebrile.     (1/17/2022 [facility]) CBC: WBC 19.5 (H), HGB 10.8 (L),  (H)    Review of Systems   Gastrointestinal: Positive for abdominal pain.   Skin: Positive for wound (LLQ).   All other systems reviewed and are negative.      Allergies:  Zaira-[place]  Prinivil [Lisinopril]    Medications:  Aspirin 81 mg   Atorvastatin  Hygroton  Clonidine  Glipizide  Avapro  Metformin  Toprol-XL  Brilinta    Past Medical History:    Type II diabetes  Hypertension  NSTEMI     Past Surgical History:    Coronary angiogram  Angioplasty  [place] teeth extraction   Cardiac stenting    Social History:  The patient presents to the emergency department with his granddaughter.  PCP: Klarissa Clifton     Physical Exam     Patient Vitals for the past 24 hrs:   BP Temp Temp src Pulse Resp SpO2 Height   01/17/22 1403 122/69 -- -- 85 16 100 % --   01/17/22 1024 123/61 98.1  F (36.7  C) Temporal 102 18 100 % 1.676 m (5' 6\")       Physical Exam  Nursing note and vitals reviewed.    Constitutional:  Appears comfortable.    HENT:    Nose normal.  No discharge.      Oral mucosa is moist.  Eyes:    Conjunctivae are " normal without injection.  Pupils are equal.  Cardiovascular:  Normal rate, regular rhythm with normal S1 and S2.      Normal heart sounds and peripheral pulses 2+ and equal.       No murmur or zoraida.  Pulmonary:  Effort normal and breath sounds clear to auscultation bilaterally.     No respiratory distress.  No stridor.     No wheezes. No rales.     GI:    Soft. No distension and no mass.      Ecchymosis across the lower left half of his abdomen with some tenderness. The incision has some bloody drainage. There is minimal erythema. No foul-smelling purulent drainage.     Upper abdomen is nontender.  Musculoskeletal:  Normal range of motion. No extremity deformity.     No edema and no tenderness.    Neurological:   Alert and oriented. No focal weakness.  Skin:    Skin is warm and dry. Bruising and wound as noted above over the lower abdomen.  Psychiatric:   Behavior is normal. Appropriate mood and affect.     Judgment and thought content normal.       Emergency Department Course   Imaging:  CT Abdomen Pelvis w/o Contrast  IMPRESSION: Irregular area of increased density in the subcutaneous fat of the left lower quadrant is suspicious for a subcutaneous hematoma. No air bubbles are identified, and an abscess is considered less likely from this appearance. Clinical correlation and follow-up are recommended to ensure resolution.  As read by Radiology.     Laboratory:  CBC: WBC 16.0 (H), HGB 10.0 (L),  (H)    BMP: Glucose 131 (H), Calcium: 8.4 (L), o/w WNL (Creatinine: 0.86)    iStat Gases (Lactate) Venous POCT: Lactic Acid: 1.7 / Bicarbonate: 28 / O2 Sat: 31 (L) / pCO2: 46 / pH: 7.40 / pO2: 20 (L)    Asymptomatic COVID-19 Virus PCR: Pending    Blood Cultures x2: Pending    Emergency Department Course:  Reviewed:  I reviewed the patient's nursing notes, vitals, past medical records, and Care Everywhere.     Assessments:  1118 I performed an exam of the patient and obtained history, as documented above.     1415 I  rechecked the patient and discussed the results with him and his granddaughter. Given the findings, he consents to admission at this time.    Consults:   1426 I consulted with Dr. Reeves, with the hospitalist service, who accepts the patient for admission..    Interventions:  1409 Unasyn 3 g IV    Disposition:  The patient was admitted to the hospital under the care of Dr. Reeves.     Impression & Plan   Medical Decision Making:  Patient comes in with drainage from his wound on his lower abdomen. He is on Brilinta and there was some blood on the dressing. I do not see obvious erythema but there is some increased pain. He has a lot of bruising in the lower abdomen. He had an apparent temperature of 100 at the urgent care but here his temperature is normal. He is not tachycardic. Blood work was obtained and his lactate is normal at 1.7, white count is elevated though at 16 and his basic metabolic panel is unremarkable. I did get 2 sets of blood cultures. Because he is diabetic, the fact that he had a reported fever and an elevated white count, I am starting him on Unasyn for possible infection. CT of the abdomen pelvis was obtained and shows a fluid collection that has the appearance of a subcutaneous hematoma rather than abscess. However with the elevated white count and the fact he is diabetic, he needs to come in. We will get general surgery consultation. Dr. Reeves will be the admitting hospitalist. We will monitor his sugars closely. His blood sugar here was 131.    Diagnosis:    ICD-10-CM    1. Infection of wound hematoma  T14.8XXA     L08.9    2. Other specified diabetes mellitus without complication, without long-term current use of insulin (H)  E13.9        Scribe Disclosure:  DANAE, Alma Rosa Mir, am serving as a scribe at 11:11 AM on 1/17/2022 to document services personally performed by Lucy Felix MD based on my observations and the provider's statements to me.     January 17, 2022   [facility]  [facility] Emergency Department     Lucy Felix MD  01/17/22 [id number]

## 2022-01-17 NOTE — PROGRESS NOTES
.RECEIVING UNIT ED HANDOFF REVIEW    ED Nurse Handoff Report was reviewed by: Dean Escudero RN on January 17, 2022 at 5:19 PM

## 2022-01-18 LAB
ANION GAP SERPL CALCULATED.3IONS-SCNC: 6 MMOL/L (ref 3–14)
BUN SERPL-MCNC: 13 MG/DL (ref 7–30)
CALCIUM SERPL-MCNC: 8 MG/DL (ref 8.5–10.1)
CHLORIDE BLD-SCNC: 109 MMOL/L (ref 94–109)
CO2 SERPL-SCNC: 23 MMOL/L (ref 20–32)
CREAT SERPL-MCNC: 0.77 MG/DL (ref 0.66–1.25)
ERYTHROCYTE [DISTWIDTH] IN BLOOD BY AUTOMATED COUNT: 16.1 % (ref 10–15)
GFR SERPL CREATININE-BSD FRML MDRD: >90 ML/MIN/1.73M2
GLUCOSE BLD-MCNC: 121 MG/DL (ref 70–99)
GLUCOSE BLDC GLUCOMTR-MCNC: 122 MG/DL (ref 70–99)
GLUCOSE BLDC GLUCOMTR-MCNC: 126 MG/DL (ref 70–99)
GLUCOSE BLDC GLUCOMTR-MCNC: 137 MG/DL (ref 70–99)
GLUCOSE BLDC GLUCOMTR-MCNC: 158 MG/DL (ref 70–99)
GLUCOSE BLDC GLUCOMTR-MCNC: 168 MG/DL (ref 70–99)
GLUCOSE BLDC GLUCOMTR-MCNC: 211 MG/DL (ref 70–99)
HCT VFR BLD AUTO: 31.5 % (ref 40–53)
HGB BLD-MCNC: 9.7 G/DL (ref 13.3–17.7)
MCH RBC QN AUTO: 23.9 PG (ref 26.5–33)
MCHC RBC AUTO-ENTMCNC: 30.8 G/DL (ref 31.5–36.5)
MCV RBC AUTO: 78 FL (ref 78–100)
PLATELET # BLD AUTO: 439 10E3/UL (ref 150–450)
POTASSIUM BLD-SCNC: 3.4 MMOL/L (ref 3.4–5.3)
RBC # BLD AUTO: 4.06 10E6/UL (ref 4.4–5.9)
SODIUM SERPL-SCNC: 138 MMOL/L (ref 133–144)
WBC # BLD AUTO: 13.9 10E3/UL (ref 4–11)

## 2022-01-18 PROCEDURE — 85027 COMPLETE CBC AUTOMATED: CPT | Performed by: INTERNAL MEDICINE

## 2022-01-18 PROCEDURE — 10140 I&D HMTMA SEROMA/FLUID COLLJ: CPT | Performed by: PHYSICIAN ASSISTANT

## 2022-01-18 PROCEDURE — 258N000003 HC RX IP 258 OP 636: Performed by: EMERGENCY MEDICINE

## 2022-01-18 PROCEDURE — 36415 COLL VENOUS BLD VENIPUNCTURE: CPT | Performed by: INTERNAL MEDICINE

## 2022-01-18 PROCEDURE — 120N000001 HC R&B MED SURG/OB

## 2022-01-18 PROCEDURE — 80048 BASIC METABOLIC PNL TOTAL CA: CPT | Performed by: INTERNAL MEDICINE

## 2022-01-18 PROCEDURE — 99232 SBSQ HOSP IP/OBS MODERATE 35: CPT | Performed by: INTERNAL MEDICINE

## 2022-01-18 PROCEDURE — 250N000013 HC RX MED GY IP 250 OP 250 PS 637: Performed by: INTERNAL MEDICINE

## 2022-01-18 PROCEDURE — 250N000011 HC RX IP 250 OP 636: Performed by: INTERNAL MEDICINE

## 2022-01-18 PROCEDURE — 99221 1ST HOSP IP/OBS SF/LOW 40: CPT | Mod: 25 | Performed by: PHYSICIAN ASSISTANT

## 2022-01-18 PROCEDURE — 250N000012 HC RX MED GY IP 250 OP 636 PS 637: Performed by: INTERNAL MEDICINE

## 2022-01-18 PROCEDURE — 0J980ZZ DRAINAGE OF ABDOMEN SUBCUTANEOUS TISSUE AND FASCIA, OPEN APPROACH: ICD-10-PCS | Performed by: SURGERY

## 2022-01-18 RX ADMIN — Medication 25 MCG: at 09:44

## 2022-01-18 RX ADMIN — METOPROLOL SUCCINATE 50 MG: 50 TABLET, EXTENDED RELEASE ORAL at 20:34

## 2022-01-18 RX ADMIN — METOPROLOL SUCCINATE 50 MG: 50 TABLET, EXTENDED RELEASE ORAL at 09:48

## 2022-01-18 RX ADMIN — AMPICILLIN SODIUM AND SULBACTAM SODIUM 3 G: 2; 1 INJECTION, POWDER, FOR SOLUTION INTRAMUSCULAR; INTRAVENOUS at 20:35

## 2022-01-18 RX ADMIN — INSULIN ASPART 1 UNITS: 100 INJECTION, SOLUTION INTRAVENOUS; SUBCUTANEOUS at 21:43

## 2022-01-18 RX ADMIN — TICAGRELOR 90 MG: 90 TABLET ORAL at 09:44

## 2022-01-18 RX ADMIN — SENNOSIDES AND DOCUSATE SODIUM 1 TABLET: 50; 8.6 TABLET ORAL at 09:44

## 2022-01-18 RX ADMIN — IRBESARTAN 150 MG: 150 TABLET ORAL at 09:44

## 2022-01-18 RX ADMIN — SENNOSIDES AND DOCUSATE SODIUM 1 TABLET: 50; 8.6 TABLET ORAL at 20:31

## 2022-01-18 RX ADMIN — ASPIRIN 81 MG: 81 TABLET, COATED ORAL at 09:43

## 2022-01-18 RX ADMIN — SODIUM CHLORIDE: 9 INJECTION, SOLUTION INTRAVENOUS at 00:45

## 2022-01-18 RX ADMIN — CLONIDINE HYDROCHLORIDE 0.1 MG: 0.1 TABLET ORAL at 09:44

## 2022-01-18 RX ADMIN — CLONIDINE HYDROCHLORIDE 0.1 MG: 0.1 TABLET ORAL at 20:34

## 2022-01-18 RX ADMIN — GLIPIZIDE 10 MG: 10 TABLET, FILM COATED, EXTENDED RELEASE ORAL at 09:43

## 2022-01-18 RX ADMIN — ATORVASTATIN CALCIUM 40 MG: 20 TABLET, FILM COATED ORAL at 21:43

## 2022-01-18 RX ADMIN — TICAGRELOR 90 MG: 90 TABLET ORAL at 20:32

## 2022-01-18 RX ADMIN — IRBESARTAN 150 MG: 150 TABLET ORAL at 20:33

## 2022-01-18 RX ADMIN — Medication 25 MCG: at 20:33

## 2022-01-18 RX ADMIN — AMPICILLIN SODIUM AND SULBACTAM SODIUM 3 G: 2; 1 INJECTION, POWDER, FOR SOLUTION INTRAMUSCULAR; INTRAVENOUS at 02:16

## 2022-01-18 RX ADMIN — AMPICILLIN SODIUM AND SULBACTAM SODIUM 3 G: 2; 1 INJECTION, POWDER, FOR SOLUTION INTRAMUSCULAR; INTRAVENOUS at 14:08

## 2022-01-18 RX ADMIN — GLIPIZIDE 10 MG: 10 TABLET, FILM COATED, EXTENDED RELEASE ORAL at 17:45

## 2022-01-18 RX ADMIN — AMPICILLIN SODIUM AND SULBACTAM SODIUM 3 G: 2; 1 INJECTION, POWDER, FOR SOLUTION INTRAMUSCULAR; INTRAVENOUS at 09:40

## 2022-01-18 RX ADMIN — SODIUM CHLORIDE: 9 INJECTION, SOLUTION INTRAVENOUS at 11:42

## 2022-01-18 RX ADMIN — CHLORTHALIDONE 12.5 MG: 25 TABLET ORAL at 21:43

## 2022-01-18 ASSESSMENT — ACTIVITIES OF DAILY LIVING (ADL)
ADLS_ACUITY_SCORE: 6
ADLS_ACUITY_SCORE: 7
ADLS_ACUITY_SCORE: 5
ADLS_ACUITY_SCORE: 8
ADLS_ACUITY_SCORE: 5
ADLS_ACUITY_SCORE: 6
ADLS_ACUITY_SCORE: 6
ADLS_ACUITY_SCORE: 7
ADLS_ACUITY_SCORE: 6
ADLS_ACUITY_SCORE: 5
ADLS_ACUITY_SCORE: 6
ADLS_ACUITY_SCORE: 5
ADLS_ACUITY_SCORE: 5
ADLS_ACUITY_SCORE: 6
ADLS_ACUITY_SCORE: 5
ADLS_ACUITY_SCORE: 6
ADLS_ACUITY_SCORE: 6

## 2022-01-18 ASSESSMENT — MIFFLIN-ST. JEOR: SCORE: 1446.75

## 2022-01-18 NOTE — H&P
81 year old male with abdominal wall skin cancer resection 5 days ago while on dual antiplatelet medication for stents placed in 10/21 and 11/21 with pain, swelling and bruising with elevated wbc and low grade fever and finding of abdomial subcutaneous hematoma being admitted with antibiotics and surgery consultation.      Preston Reeves MD    Dictation # [id number]

## 2022-01-18 NOTE — CONSULTS
"Surgery    Patient seen.   Chart and outside records reviewed.  Underwent excisional biopsy of abdominal skin cancer at [facility] on 1/12/22.   No concerns or complaints at this time.      Vital signs:  Temp: 98.2  F (36.8  C) Temp src: Oral BP: 128/75 Pulse: 90   Resp: 18 SpO2: 100 % O2 Device: None (Room air)   Height: 167.6 cm (5' 6\") Weight: 78.9 kg (173 lb 15.1 oz)  Estimated body mass index is 28.08 kg/m  as calculated from the following:    Height as of this encounter: 1.676 m (5' 6\").    Weight as of this encounter: 78.9 kg (173 lb 15.1 oz).    Wbc 13.9 (16)  BCx NGTD    Bedside Procedure: Rationale, risks and outcomes for Incision and drainage were discussed and patient verbally consented to proceed. The site was identified.  Steri strips were removed. Using an iris scissors, the closing suture at the previous biopsy incision was opened for a length of approximately 2.5 cm. Turbid, purulent liquid hematoma was expressed, totaling ~ 15 ml.  The wound was probed.  The limits of the cavity were consistent with the biopsied specimen and no tunneling was identified. The wound was pressure irrigated with 30 ml of saline. There was no bleeding identified. The wound was packed with saline dampened gauze.  Dry gauze and tape were then applied.  The patient tolerated the procedure with an appropriate level of discomfort.  Change dressing daily.     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 Margins negative.    - continue abx.   - expect infection will resolve more readily with drainage and packing.   - daily wound care.   - ok for anticoagulation  - will follow closely while in hospital  - discharge when stable.   - follow up with the [facility]     Noé Corona PA-C  [facility]  Office: [phone]  Pager: [phone]    "

## 2022-01-18 NOTE — H&P
Admitted: 01/17/2022    PRIMARY CARE PHYSICIAN:  Bhumika Thomas PA-C    DERMATOLOGIST:  Shirley Brock MD    CHIEF COMPLAINT:  Left lower abdomen wound check with recent resection.    HISTORY OF PRESENT ILLNESS:  Son Shy is a very pleasant 81-year-old Belarusian-speaking gentleman who presents to [facility] for a wound check.  The patient is unable to speak and his granddaughter provides the interpretive services.  He has history of type 2 diabetes and NSTEMI in 10/2021 where he underwent stenting of his OM1 and then a staged intervention in November with two further stents in the LAD territory.  He has been on Brilinta and aspirin since then.  The patient had an abdominal lesion, according to the daughter, for about 5 years.  The patient's dermatologist performed a biopsy in order to be cancerous.  The patient underwent a resection of this mass 5 days ago.  The patient had self-absorbing sutures in that area; that area is quite bruised and noted over the past few days the incision became more bloody drainage with more pain and discomfort.  The patient was seen at urgent care earlier in the day where he noted to have a fever of 100.  A CBC was done, which was noted to be elevated at 19,500, hemoglobin was 10.8, platelets of 516.  He was sent to [facility], after being given Tylenol, for further assessment.    In the Emergency Department, the patient was seen by Dr. Lucy Felix.  The patient was afebrile here.  Electrolytes were unremarkable.  Normal kidney function.  Glucose 131.  White count was elevated at 16,000, hemoglobin 10, platelets 503.  Blood cultures were obtained.  COVID test was negative.  He had a CT of the abdomen and pelvis.  This showed an irregular area of increased density in the subcutaneous fat of the left lower quadrant.  There is suspicion for hematoma measuring approximately 5.6 x 1.8 x 1.9 cm.  There are no associated air bubbles identified and mild  overlying skin thickening and mild surrounding subcutaneous edema.  Overall this appears to be more of a hematoma and less likely an abscess.  Given the patient being a diabetic he was given Unasyn and the patient is being admitted for surgical consultation.    PAST MEDICAL HISTORY:  1.  ASCVD with stenting in October and November of his OM1 and LAD territory.  2.  Type 2 diabetes.  3.  Hypertension.  4.  Skin cancer.    PAST SURGICAL HISTORY:  Coronary angiogram with angioplasty, and wisdom tooth extraction.    SOCIAL HISTORY:  No tobacco or alcohol.    ALLERGIES:  KEVIN-SELTZER AND LISINOPRIL.    CURRENT MEDICATION LIST:  1.  Aspirin.  2.  Nitroglycerin.  3.  Brilinta.  4.  Atorvastatin.  5.  Chlorthalidone.  6.  Clonidine.  7.  Glipizide.  8.  Irbesartan.  9.  Metformin.  10.  Metoprolol.  11.  Vitamin D.    REVIEW OF SYSTEMS:  A 10-point review of systems was reviewed and as dictated in the history of present illness.    FAMILY HISTORY:  Reviewed and negative.    PHYSICAL EXAMINATION:  VITAL SIGNS:  Temperature 98.1, heart rate 90, respirations 17, blood pressure 118/72, sats are 98% on room air.  GENERAL:  The patient is a 81-year-old Chinese gentleman who appears to be comfortable and nontoxic appearing.  HEENT:  Exam is unremarkable.  NECK:  JVP is not elevated.  PULMONARY:  Lungs are clear to auscultation.  CARDIOVASCULAR:  S1, S2 regular rate and rhythm.  GASTROINTESTINAL:  Abdomen is soft.  The abdominal exam shows the left lower quadrant area of swelling, bruising and some induration.  There is no purulence noted.  He has some tenderness.  There is no guarding or rebound.  MUSCULOSKELETAL:  Shows no edema.  NEUROLOGIC:  He is grossly nonfocal.  Cranial nerves grossly intact.  SKIN:  Warm, dry, well perfused.  PSYCHIATRIC:  Mood and affect calm.    LABORATORY AND IMAGING STUDIES:  As dictated in history of present illness.    ASSESSMENT:  Son Shy is 81, who had a skin mass excision of his left lower  quadrant of the abdomen, who is on dual antiplatelet therapy for stents that were placed in 10/2021 and 2021, admitted with a 3-4 day history of increasing bruising, pain and swelling, and imaging study that appears to be more hematoma than an abscess, being admitted for further treatment.    PLAN:  1.  Left lower quadrant subcutaneous tissue hematoma from recent dermatological surgery.  The patient will be admitted as inpatient and will be started on Unasyn given that he is diabetic.  We will obtain a General Surgery consultation.  The hematoma is likely formed with the patient being on dual antiplatelet therapy.  We will continue the patient on Unasyn.  We will follow his white count.  We may need to have Wound Care see the patient.  The patient is COVID negative.  2.  Atherosclerotic cardiovascular disease.  The patient had stenting in 2021 and 10/2021 and is on dual antiplatelet therapy.  He had continued to take his Brilinta and aspirin and we will continue that for now.  3.  Diabetes.  The patient is on glipizide and metformin.  We will hold the patient's metformin.  Continue the patient on Glucotrol and place him on a moderate carbohydrate diet and cover him with sliding scale insulin.  4.  Hypertension.  The patient will be continued on Avapro, clonidine, chlorthalidone and metoprolol.  5.  Deep venous thrombosis prophylaxis:  The patient received compression boots.    CODE STATUS:  Full.      Preston Reeves MD        D: 2022   T: 2022   MT: [facility]    Name:     GHULAM LOPEZ  MRN:      [id number]        Account:     [id number]   :      1940           Admitted:    2022       Document: [id number]    cc:  MD Bhumika Shah PA-C

## 2022-01-18 NOTE — UTILIZATION REVIEW
Admission Status; Secondary Review Determination       Under the authority of the Utilization Management Committee, the utilization review process indicated a secondary review on the above patient. The review outcome is based on review of the medical records, discussions with staff, and applying clinical experience noted on the date of the review.     (x) Inpatient Status Appropriate - This patient's medical care is consistent with medical management for inpatient care and reasonable inpatient medical practice.     RATIONALE FOR DETERMINATION    81-year-old male who recently underwent excision of a left abdominal squamous cell carcinoma on 1/12/2022. Patient presents with increasing pain, swelling, and bruising over the left lower abdomen surrounding the surgical site. Lab work demonstrates leukocytosis. Abdominal CT scan reveals abdominal wall hematoma with overlying skin thickening and surrounding subcutaneous edema.   There is evidence of induration and swelling at the excision site. The midportion of the incision was opened with drainage of infected appearing hematoma.   Patient requires inpatient admission versus short stay observation or outpatient treatment for the following reasons: This is a serious postop infection with wound hematoma  inflammatory response that is infected, elevated white count suggesting systemic, in a diabetic patient, on intravenous antibiotic requiring further hospital care for surgical evaluation of his wound and close monitoring for sepsis development.  The expected length of stay at the time of admission was more than 2 nights because of the severity of illness, intensity of service provided, and risk for adverse outcome. Inpatient admission is appropriate.         This document was produced using voice recognition software       The information on this document is developed by the utilization review team in order for the business office to ensure compliance. This only denotes  the appropriateness of proper admission status and does not reflect the quality of care rendered.   The definitions of Inpatient Status and Observation Status used in making the determination above are those provided in the CMS Coverage Manual, Chapter 1 and Chapter 6, section 70.4.   Sincerely,   NIK PRICE MD   System Medical Director   Utilization Management   [facility].

## 2022-01-18 NOTE — PLAN OF CARE
Patient arrived @1800. Patient is A&O x4, Up with assist assist of 1 with GB and walker. Bilingual Japanese.  VSS on RA. Patient denies pain. PIV saline locked. BS monitored and insulin was not given at bed time due to sliding scale parameter not met. Voiding adequately in bathroom. Dressing on lower abdomen quadrant is dry and intact . Will continue to monitor.

## 2022-01-18 NOTE — PLAN OF CARE
A/Ox4. Wolof speaking but understands simple english. VSS on RA. Denies pain at rest, some pain with movement but declines intervention. Dressing to LLQ with dried drainage. Tolerating mod carb diet. IVF. Surgery consult. Discharge pending.

## 2022-01-19 LAB
ERYTHROCYTE [DISTWIDTH] IN BLOOD BY AUTOMATED COUNT: 16.5 % (ref 10–15)
GLUCOSE BLDC GLUCOMTR-MCNC: 125 MG/DL (ref 70–99)
GLUCOSE BLDC GLUCOMTR-MCNC: 133 MG/DL (ref 70–99)
GLUCOSE BLDC GLUCOMTR-MCNC: 229 MG/DL (ref 70–99)
GLUCOSE BLDC GLUCOMTR-MCNC: 231 MG/DL (ref 70–99)
GLUCOSE BLDC GLUCOMTR-MCNC: 260 MG/DL (ref 70–99)
GLUCOSE BLDC GLUCOMTR-MCNC: 83 MG/DL (ref 70–99)
HCT VFR BLD AUTO: 30.2 % (ref 40–53)
HGB BLD-MCNC: 9.4 G/DL (ref 13.3–17.7)
MCH RBC QN AUTO: 24 PG (ref 26.5–33)
MCHC RBC AUTO-ENTMCNC: 31.1 G/DL (ref 31.5–36.5)
MCV RBC AUTO: 77 FL (ref 78–100)
PLATELET # BLD AUTO: 460 10E3/UL (ref 150–450)
RBC # BLD AUTO: 3.92 10E6/UL (ref 4.4–5.9)
WBC # BLD AUTO: 8.2 10E3/UL (ref 4–11)

## 2022-01-19 PROCEDURE — 250N000013 HC RX MED GY IP 250 OP 250 PS 637: Performed by: INTERNAL MEDICINE

## 2022-01-19 PROCEDURE — 85027 COMPLETE CBC AUTOMATED: CPT | Performed by: INTERNAL MEDICINE

## 2022-01-19 PROCEDURE — 250N000011 HC RX IP 250 OP 636: Performed by: INTERNAL MEDICINE

## 2022-01-19 PROCEDURE — 36415 COLL VENOUS BLD VENIPUNCTURE: CPT | Performed by: INTERNAL MEDICINE

## 2022-01-19 PROCEDURE — 99232 SBSQ HOSP IP/OBS MODERATE 35: CPT | Performed by: INTERNAL MEDICINE

## 2022-01-19 PROCEDURE — 120N000001 HC R&B MED SURG/OB

## 2022-01-19 RX ORDER — METFORMIN HCL 500 MG
1000 TABLET, EXTENDED RELEASE 24 HR ORAL 2 TIMES DAILY WITH MEALS
Status: DISCONTINUED | OUTPATIENT
Start: 2022-01-19 | End: 2022-01-20 | Stop reason: HOSPADM

## 2022-01-19 RX ADMIN — METOPROLOL SUCCINATE 50 MG: 50 TABLET, EXTENDED RELEASE ORAL at 09:05

## 2022-01-19 RX ADMIN — CHLORTHALIDONE 12.5 MG: 25 TABLET ORAL at 21:13

## 2022-01-19 RX ADMIN — TICAGRELOR 90 MG: 90 TABLET ORAL at 06:35

## 2022-01-19 RX ADMIN — TICAGRELOR 90 MG: 90 TABLET ORAL at 18:42

## 2022-01-19 RX ADMIN — AMPICILLIN SODIUM AND SULBACTAM SODIUM 3 G: 2; 1 INJECTION, POWDER, FOR SOLUTION INTRAMUSCULAR; INTRAVENOUS at 14:50

## 2022-01-19 RX ADMIN — GLIPIZIDE 10 MG: 10 TABLET, FILM COATED, EXTENDED RELEASE ORAL at 09:06

## 2022-01-19 RX ADMIN — AMPICILLIN SODIUM AND SULBACTAM SODIUM 3 G: 2; 1 INJECTION, POWDER, FOR SOLUTION INTRAMUSCULAR; INTRAVENOUS at 07:43

## 2022-01-19 RX ADMIN — IRBESARTAN 150 MG: 150 TABLET ORAL at 09:06

## 2022-01-19 RX ADMIN — ACETAMINOPHEN 650 MG: 325 TABLET, FILM COATED ORAL at 01:19

## 2022-01-19 RX ADMIN — INSULIN ASPART 2 UNITS: 100 INJECTION, SOLUTION INTRAVENOUS; SUBCUTANEOUS at 22:12

## 2022-01-19 RX ADMIN — IRBESARTAN 150 MG: 150 TABLET ORAL at 20:58

## 2022-01-19 RX ADMIN — METFORMIN ER 500 MG 1000 MG: 500 TABLET ORAL at 18:42

## 2022-01-19 RX ADMIN — Medication 25 MCG: at 20:58

## 2022-01-19 RX ADMIN — CLONIDINE HYDROCHLORIDE 0.1 MG: 0.1 TABLET ORAL at 09:05

## 2022-01-19 RX ADMIN — Medication 25 MCG: at 09:05

## 2022-01-19 RX ADMIN — CLONIDINE HYDROCHLORIDE 0.1 MG: 0.1 TABLET ORAL at 20:58

## 2022-01-19 RX ADMIN — AMOXICILLIN AND CLAVULANATE POTASSIUM 1 TABLET: 875; 125 TABLET, FILM COATED ORAL at 20:59

## 2022-01-19 RX ADMIN — ASPIRIN 81 MG: 81 TABLET, COATED ORAL at 09:05

## 2022-01-19 RX ADMIN — SENNOSIDES AND DOCUSATE SODIUM 1 TABLET: 50; 8.6 TABLET ORAL at 20:58

## 2022-01-19 RX ADMIN — ATORVASTATIN CALCIUM 40 MG: 20 TABLET, FILM COATED ORAL at 21:13

## 2022-01-19 RX ADMIN — AMPICILLIN SODIUM AND SULBACTAM SODIUM 3 G: 2; 1 INJECTION, POWDER, FOR SOLUTION INTRAMUSCULAR; INTRAVENOUS at 01:18

## 2022-01-19 RX ADMIN — SENNOSIDES AND DOCUSATE SODIUM 1 TABLET: 50; 8.6 TABLET ORAL at 09:05

## 2022-01-19 RX ADMIN — GLIPIZIDE 10 MG: 10 TABLET, FILM COATED, EXTENDED RELEASE ORAL at 18:42

## 2022-01-19 ASSESSMENT — ACTIVITIES OF DAILY LIVING (ADL)
ADLS_ACUITY_SCORE: 6

## 2022-01-19 NOTE — PLAN OF CARE
Infected LLQ wound biopsy - dressing CDI. Wolof speaking - understands basic english. Aox4. VSS on RA. SBA. Mod CHO. , 126, 137. Cont of B&B. +flatus +BS -BM. PIV infusing NS @ 125 mL/hr. Denies pain. General surgery following. discharge pending continued pt improvement abx plan.

## 2022-01-19 NOTE — CONSULTS
Care Management Initial Consult    General Information  Assessment completed with: Patient,Children, Adryan  Type of CM/SW Visit: Initial Assessment    Primary Care Provider verified and updated as needed: Yes   Readmission within the last 30 days: no previous admission in last 30 days      Reason for Consult: discharge planning  Advance Care Planning: Advance Care Planning Reviewed: no concerns identified          Communication Assessment  Patient's communication style: spoken language (non-English)    Hearing Difficulty or Deaf: yes   Wear Glasses or Blind: no    Cognitive  Cognitive/Neuro/Behavioral: WDL                      Living Environment:   People in home:       Current living Arrangements:        Able to return to prior arrangements:         Family/Social Support:  Care provided by:    Provides care for:                  Description of Support System:           Current Resources:   Patient receiving home care services:       Community Resources:    Equipment currently used at home: cane, straight  Supplies currently used at home:      Employment/Financial:  Employment Status:          Financial Concerns:             Lifestyle & Psychosocial Needs:  Social Determinants of Health     Tobacco Use: Medium Risk     Smoking Tobacco Use: Former Smoker     Smokeless Tobacco Use: Never Used   Alcohol Use: Not on file   Financial Resource Strain: Not on file   Food Insecurity: Not on file   Transportation Needs: Not on file   Physical Activity: Not on file   Stress: Not on file   Social Connections: Not on file   Intimate Partner Violence: Not on file   Depression: Not on file   Housing Stability: Not on file       Functional Status:  Prior to admission patient needed assistance:              Mental Health Status:          Chemical Dependency Status:                Values/Beliefs:  Spiritual, Cultural Beliefs, Pentec[employer]al Practices, Values that affect care:                 Additional Information:  Met with pt at  bedside. Son is Serbian speaking. A&O x4. He elects to call his son-in-law, Adryan, to help translate. Introduced self and role. Explained that the patient will be staying in the hospital one more night so a WOCN can evaluate tomorrow. CC will follow up for discharge planning, including HC RN.      Lucero Salcedo RN

## 2022-01-19 NOTE — PROGRESS NOTES
[facility]    Hospitalist Progress Note    Date of Service (when I saw the patient): 01/19/2022  Admit date: 1/17/2022    Assessment & Plan   Son Shy is 81, who had a skin mass excision of his left lower quadrant of the abdomen, who is on dual antiplatelet therapy for stents that were placed in 10/2021 and 11/2021, admitted with a 3-4 day history of increasing bruising, pain and swelling, and imaging study that appears to be more hematoma than an abscess, being admitted for further treatment.    Abdominal CT: abdominal wall hematoma with overlying skin thickening and surrounding subcutaneous edema.      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  Infected hematoma at surgical site  On DAPT for CAD  - Continued on Unasyn > change to Augmentin BID on 01/19/22.  BCx NGTD. No other cultures sent.   - General Surgery consultation appreciated. S/p I&D with packing at bedside on 01/18/22, dressing changed today.  - Plan for discharge tomorrow AM if surgery agrees. Will need initial wound care instructions from surgery with follow up with  Dermatology. Care Management consult place to help us arrange follow up and wound care at discharge.     CAD, S/p PCI 11/2021 and 10/2021 and is on dual antiplatelet therapy.    HTN  - Hematoma stable. Continue Brilinta and ASA given recent stents, surgery in agreement.   - Continue Avapro, clonidine, chlorthalidone and metoprolol, statin      DMT2 A1c 7.4 on 1/17  - Resume PTA metformin on 01/19/22.   - Continue PTA glipizide.   - Follow on sliding scale insulin.   Recent Labs   Lab 01/19/22  1100 01/19/22  0823 01/19/22  0627 01/19/22  0243 01/18/22  2113 01/18/22  1654   * 229* 125* 83 211* 158*        Diet: Orders Placed This Encounter      Moderate Consistent Carb (60 g CHO per Meal) Diet     IVF: None  [place] Catheter: Not present     DVT Prophylaxis: On DAPT, ambulating.   Code Status: Full Code     Disposition:  Expected discharge tomorrow  Communication: Discussed with RN, patient's granddaughter and patient on 01/19/22    Kinza Sood  Hospitalist Service  [facility]  Securely message with the Vocera Web Console (learn more here)  Text page via [facility] Paging/Directory    Interval History   Events over last 24 hours as outlined above.   No pain today. Feels good. Asks how he should care for the wound? Will a nurse come by?   Denies any SOB, CP, N/V/D. Abdominal pain minimal.     -Data reviewed today: I reviewed all new labs and imaging results over the last 24 hours. I personally reviewed no images or EKG's today.    Physical Exam   Temp: 97.7  F (36.5  C) Temp src: Oral BP: 113/65 Pulse: 92   Resp: 16 SpO2: 98 % O2 Device: None (Room air)    Vitals:    01/18/22 0622   Weight: 78.9 kg (173 lb 15.1 oz)     Vital Signs with Ranges  Temp:  [97.7  F (36.5  C)-98.5  F (36.9  C)] 97.7  F (36.5  C)  Pulse:  [77-92] 92  Resp:  [16-18] 16  BP: ()/(60-65) 113/65  SpO2:  [98 %-99 %] 98 %  I/O last 3 completed shifts:  In: -   Out: 1550 [Urine:1550]    Today's Exam  Constitutional:  NAD,   Neuropsyche: Talkative, jovial, alert and oriented, answers questions appropriately.   Respiratory:  Breathing comfortably, good air exchange, no wheezes, no crackles.   Cardiovascular:  Regular rate and rhythm, no edema.  GI:  soft, NT/ND, BS normal Undressed wound. No surrounding erythema or induration, or discharge. Wound is packed.  Skin/Integumen:  No acute rash or sign of bleeding.     Medications   All medications reviewed on 01/19/22      sodium chloride 125 mL/hr at 01/18/22 1142       amoxicillin-clavulanate  1 tablet Oral Q12H ARELY     aspirin  81 mg Oral Daily     atorvastatin  40 mg Oral At Bedtime     chlorthalidone  12.5 mg Oral At Bedtime     cloNIDine  0.1 mg Oral BID     glipiZIDE  10 mg Oral BID w/meals     insulin aspart  1-7 Units Subcutaneous TID AC     insulin aspart  1-5 Units Subcutaneous At  Bedtime     irbesartan  150 mg Oral BID     metFORMIN  1,000 mg Oral BID w/meals     metoprolol succinate ER  50 mg Oral BID     senna-docusate  1 tablet Oral BID    Or     senna-docusate  2 tablet Oral BID     sodium chloride (PF)  3 mL Intracatheter Q8H     ticagrelor  90 mg Oral Q12H     Vitamin D3  25 mcg Oral BID       Data   Recent Labs   Lab 01/19/22  1100 01/19/22  1025 01/19/22  0823 01/19/22  0627 01/18/22  0838 01/18/22  0622 01/17/22 2015 01/17/22  1254 01/17/22  1251   WBC  --  8.2  --   --   --  13.9*  --  16.0*  --    HGB  --  9.4*  --   --   --  9.7*  --  10.0*  --    MCV  --  77*  --   --   --  78  --  80  --    PLT  --  460*  --   --   --  439  --  503*  --    NA  --   --   --   --   --  138  --   --  139   POTASSIUM  --   --   --   --   --  3.4  --   --  3.7   CHLORIDE  --   --   --   --   --  109  --   --  105   CO2  --   --   --   --   --  23  --   --  27   BUN  --   --   --   --   --  13  --   --  18   CR  --   --   --   --   --  0.77  --   --  0.86   ANIONGAP  --   --   --   --   --  6  --   --  7   MÓNICA  --   --   --   --   --  8.0*  --   --  8.4*   *  --  229* 125*   < > 121*   < >  --  131*    < > = values in this interval not displayed.       No results found for this or any previous visit (from the past 24 hour(s)).

## 2022-01-19 NOTE — PLAN OF CARE
AOx4, [place], doesn't speak much english but understands spoken english. VSS on RA, soft BPs. SBA. C/o LLQ pain tylenol given x1, effective. Dressing marked and then found to be saturated with serosanginous fluid -- applied new gauze dressing and taped it in place. On intermittent abx. Adequate I/Os, voids in urinal. Tolerates mod carb diet. Continent of B&B, CMS intact. PIV SL. BG 83 and 125. Discharge pending resolution of infection.

## 2022-01-19 NOTE — PROGRESS NOTES
"Surgery    No complaints.   Minimal pain at wound site.     Gen:  Awake, Alert, NAD, pleasant  Blood pressure 113/65, pulse 92, temperature 97.7  F (36.5  C), temperature source Oral, resp. rate 16, height 1.676 m (5' 6\"), weight 78.9 kg (173 lb 15.1 oz), SpO2 98 %.  Resp - Relaxed.    Abdomen - soft, tender near wound, harriett-wound induration and ecchymosis. nondistended. Dressings removed. Wound irrigated and repacked with damp kerlix.  Covered with ABD and tape.    Wbc 8.2  Hgb 9.4  BCx NGTD    A/P Infected hematoma s/p excisional biopsy of squamous cell carcinoma on right lower abdomen at dermatology clinic.     - Dressings changed today.   - Wound is more clean.   - Patient has remained afebrile   - On Unasyn. Transition to PO abx at hospitalist discretion.   - On Brilinta  - Dispo planning.   - Follow up with  Dermatology clinic for outpatient wound care.     Noé Corona PA-C          "

## 2022-01-20 VITALS
SYSTOLIC BLOOD PRESSURE: 132 MMHG | OXYGEN SATURATION: 100 % | HEART RATE: 88 BPM | HEIGHT: 66 IN | TEMPERATURE: 97.5 F | RESPIRATION RATE: 16 BRPM | DIASTOLIC BLOOD PRESSURE: 70 MMHG | WEIGHT: 173.94 LBS | BODY MASS INDEX: 27.95 KG/M2

## 2022-01-20 LAB
GLUCOSE BLDC GLUCOMTR-MCNC: 104 MG/DL (ref 70–99)
GLUCOSE BLDC GLUCOMTR-MCNC: 114 MG/DL (ref 70–99)

## 2022-01-20 PROCEDURE — 250N000013 HC RX MED GY IP 250 OP 250 PS 637: Performed by: INTERNAL MEDICINE

## 2022-01-20 PROCEDURE — G0463 HOSPITAL OUTPT CLINIC VISIT: HCPCS

## 2022-01-20 PROCEDURE — 99239 HOSP IP/OBS DSCHRG MGMT >30: CPT | Performed by: INTERNAL MEDICINE

## 2022-01-20 RX ADMIN — CLONIDINE HYDROCHLORIDE 0.1 MG: 0.1 TABLET ORAL at 08:19

## 2022-01-20 RX ADMIN — IRBESARTAN 150 MG: 150 TABLET ORAL at 08:19

## 2022-01-20 RX ADMIN — ASPIRIN 81 MG: 81 TABLET, COATED ORAL at 08:12

## 2022-01-20 RX ADMIN — GLIPIZIDE 10 MG: 10 TABLET, FILM COATED, EXTENDED RELEASE ORAL at 08:13

## 2022-01-20 RX ADMIN — SENNOSIDES AND DOCUSATE SODIUM 1 TABLET: 50; 8.6 TABLET ORAL at 08:13

## 2022-01-20 RX ADMIN — Medication 25 MCG: at 08:13

## 2022-01-20 RX ADMIN — TICAGRELOR 90 MG: 90 TABLET ORAL at 06:49

## 2022-01-20 RX ADMIN — AMOXICILLIN AND CLAVULANATE POTASSIUM 1 TABLET: 875; 125 TABLET, FILM COATED ORAL at 08:13

## 2022-01-20 RX ADMIN — METFORMIN ER 500 MG 1000 MG: 500 TABLET ORAL at 08:12

## 2022-01-20 RX ADMIN — METOPROLOL SUCCINATE 50 MG: 50 TABLET, EXTENDED RELEASE ORAL at 08:19

## 2022-01-20 ASSESSMENT — ACTIVITIES OF DAILY LIVING (ADL)
ADLS_ACUITY_SCORE: 6

## 2022-01-20 NOTE — DISCHARGE INSTRUCTIONS
"Location: left lower abdomen  Care: provided every other day  (wash hands with soap and water before and after wound cares)  1. Lift mepilex and set aside  2. Remove Aquacel Ag and throw in the garbage   3. Wash the wound and the surrounding skin with commercial wound cleanser (or soap and water) and 4x4\" gauze and cotton tipped applicators (qtips), then pat dry   4. Cut a small piece of Aquacel Ag (or like product), tuck into wound and leave a \"tail\" for ease of removal. Cut the piece smaller as the wound heals closed  5. Cover with a 4x4\" mepilex (or like product) ok to use each mepilex for up to 5 days   Keep caring for the wound until it is completely closed and healed    "

## 2022-01-20 NOTE — PROGRESS NOTES
"Surgery    Patient has no new complaints.  Denies abdominal pain.    Blood pressure 132/70, pulse 88, temperature 97.5  F (36.4  C), temperature source Oral, resp. rate 16, height 1.676 m (5' 6\"), weight 78.9 kg (173 lb 15.1 oz), SpO2 100 %.  Wound: Dressings in place from yesterday.  Serosanguineous drainage.  Dressings left in place.    WOC nurse consult ordered yesterday.   Anticipate discharge following wound evaluation dressing recommendations by WOC nurse.  Patient to follow-up at [facility] dermatology clinic as outpatient for wound care.  Surgery team will sign off.    Noé Corona PA-C    "

## 2022-01-20 NOTE — CONSULTS
"[facility]  WO Nurse Inpatient Wound Assessment     Reason for consultation: Evaluate and treat \"Abdomen\"       Assessment  abd wound due to Surgical Wound    Treatment Plan  LLQ wound: Every other day    Wound care  Start:  01/22/22 0600,   EVERY OTHER DAY,   Routine      Comments: Location: left lower abdomen   Care: provided every other day   (wash hands with soap and water before and after wound cares)   1. Lift mepilex and set aside   2. Remove Aquacel Ag and throw in the garbage   3. Wash the wound and the surrounding skin with commercial wound cleanser (or soap and water) and 4x4\" gauze and cotton tipped applicators (qtips), then pat dry   4. Cut a small piece of Aquacel Ag (or like product), tuck into wound and leave a \"tail\" for ease of removal   5. Cover with a 4x4\" mepilex (or like product)      Modified from: Wound care - DAILY Start: 01/20/22 0600, DAILY, Routine          Orders Written and Updated  Recommended provider order: None, at this time  [facility] Nurse follow-up plan: weekly  Nursing to notify the Provider(s) and re-consult the [facility] Nurse if wound(s) deteriorates or new skin concern.    [facility] provided education in person to patient and son, provided physical demonstration of wound cares, patient and son verbalized understanding and feel comfortable performing dressing changes at home.  used via hospital phone at start of consult, midway through consult patient son arrived in person, patient stated he wanted to use son instead of , offered and declined continued use of .     Patient History  According to provider note(s):  \"who had a skin mass excision of his left lower quadrant of the abdomen, who is on dual antiplatelet therapy for stents that were placed in 10/2021 and 11/2021, admitted with a 3-4 day history of increasing bruising, pain and swelling, and imaging study that appears to be more hematoma than an abscess, being " "admitted for further treatment.     Abdominal CT: abdominal wall hematoma with overlying skin thickening and surrounding subcutaneous edema. \"    Objective Data  Containment of urine/stool: Continent of bladder and Continent of bowel    Active Diet Order:  Orders Placed This Encounter      Moderate Consistent Carb (60 g CHO per Meal) Diet      Diet    Output:   I/O last 3 completed shifts:  In: -   Out: 1175 [Urine:1175]    Risk Assessment:   Sensory Perception: 4-->no impairment  Moisture: 4-->rarely moist  Activity: 3-->walks occasionally  Mobility: 3-->slightly limited  Nutrition: 3-->adequate  Friction and Shear: 2-->potential problem  Bryson Score: 19                          Labs: Recent Labs   Lab 01/19/22  1025 01/18/22  0622 01/17/22 1954   HGB 9.4*   < >  --    WBC 8.2   < >  --    A1C  --   --  7.4*    < > = values in this interval not displayed.       Physical Exam  Skin inspection: focused LLQ abd        Wound Location:  LLQ  Date of last photo:  1-20  Wound History: \"S/P excision of a left abdominal squamous cell carcinoma on 1/12/2022.  Infected hematoma at surgical site\"  Measurements (length x width x depth, in cm):  0.4cm x 3.0cm x 1.2cm   Wound Base:  granulation tissue, muscle and adipose tissue  Tunneling: N/A  Undermining: N/A  Palpation of the wound bed: normal   Periwound skin: intact and ecchymosis  Color: normal and consistent with surrounding tissue  Temperature: normal   Drainage: moderate  Description of drainage: serosanguinous  Odor: none  Pain: denies pain at rest and with palpation, reports mild to moderate discomfort with wound cleansing     Interventions  Current support surface: Standard  Atmos Air mattress  Current off-loading measures: Pillows  Visual inspection of wound(s) completed  Wound Care: done per plan of care  Supplies: gathered, placed at the bedside, floor stock, discussed with RN, discussed with family and discussed with patient  Education provided: plan of care, " Infection prevention , Moisture management and Hygiene  Discussed plan of care with Patient, Family and Nurse     Aneta PATTERSON

## 2022-01-20 NOTE — PLAN OF CARE
A&Ox4, Ind in room, VSS on RA. LLQ CDI, uses urinal. Denies any pain, slept well through the night. Discharge possibly today.

## 2022-01-20 NOTE — PROGRESS NOTES
AO X4. Khmer spoken at home. Doesn't speak much english, but uses granddaughter and son to help at times. VSS on RA, softer BP's. LLQ dressing changed this shift by doc. Adequate I/O's, uses urinal. Mod carb diet. Continent of B&B. Denies any pain at this time. Discharge possible tomorrow morning.

## 2022-01-20 NOTE — DISCHARGE SUMMARY
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  Infected hematoma at surgical site  CAD, S/p PCI 11/2021 and 10/2021 and is on dual antiplatelet therapy.    HTN  Chronic anemia ~ 10-11, stable   DMT2 A1c 7.4 on 1/17    Follow-ups Needed After Discharge   Follow-up Appointments     Follow-up and recommended labs and tests       Follow up with primary care provider, ANGELA LOPEZ, within 7 days to   evaluate medication change and for hospital follow- up.  No follow up labs   or test are needed.  Follow up with  Dermatology within the week.             Unresulted Labs Ordered in the Past 30 Days of this Admission     Date and Time Order Name Status Description    1/17/2022 11:30 AM Blood Culture Peripheral Blood Preliminary     1/17/2022 11:30 AM Blood Culture Peripheral Blood Preliminary       Hospitalist group will be notified if cultures turn positive. No growth to date.       Discharge Disposition   Discharged to home  Condition at discharge: Good      Hospital Course   Kostas Begum is 81, who had a skin mass excision of his left lower quadrant of the abdomen, who is on dual antiplatelet therapy for stents that were placed in 10/2021 and 11/2021, admitted with a 3-4 day history of increasing bruising, pain and swelling, and imaging study that appears to be more hematoma than an abscess, being admitted for further treatment.    Abdominal CT: abdominal wall hematoma with overlying skin thickening and surrounding subcutaneous edema.      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  Infected hematoma at surgical site  On DAPT for CAD  - Continued on Unasyn > change to Augmentin BID on 01/19/22.  BCx NGTD. No other cultures  sent.   - General Surgery consultation appreciated. S/p I&D with packing at bedside on 01/18/22.   - Wound care consulted and discussed with care management who will help to arrange wound care post discharge and follow up with  Dermatology.     CAD, S/p PCI 11/2021 and 10/2021 and is on dual antiplatelet therapy.    HTN  - Hematoma stable. Continue Brilinta and ASA given recent stents, surgery in agreement.   - Continue Avapro, clonidine, chlorthalidone and metoprolol, statin      Chronic anemia ~ 10-11, stable   Recent Labs   Lab 01/19/22  1025 01/18/22  0622 01/17/22  1254   HGB 9.4* 9.7* 10.0*     - follow up hgb and anemia work up per PCP recommended in about a month.     DMT2 A1c 7.4 on 1/17  - Continue PTA metformin on 01/19/22.   - Continue PTA glipizide.     Recent Labs   Lab 01/20/22  0809 01/20/22  0149 01/19/22  2205 01/19/22  1808 01/19/22  1100 01/19/22  0823   * 104* 260* 133* 231* 229*     Communication: Discussed with RN, patient's son-in-law, care management and patient on 01/20/22.    Kinza Sood  Hospitalist Service  [facility]  Securely message with the Vocera Web Console (learn more here)  Text page via [employer] Paging/Directory      Consultations This Hospital Stay   SURGERY GENERAL IP CONSULT  CARE MANAGEMENT / SOCIAL WORK IP CONSULT  WOUND OSTOMY CONTINENCE NURSE  IP CONSULT    Code Status   Full Code    Time Spent on this Encounter   I, Kinza Sood MD, personally saw the patient today and spent greater than 30 minutes discharging this patient.       Kinza Sood MD  [facility]  [address] [facility], [place] [address]  Phone: [phone]  Fax: [phone]  ______________________________________________________________________    Physical Exam   Vital Signs: Temp: 97.5  F (36.4  C) Temp src: Oral BP: 132/70 Pulse: 88   Resp: 16 SpO2: 100 % O2 Device: None (Room air)    Weight: 173 lbs 15.09  oz  Constitutional:  NAD,   Neuropsyche: smiling, alert and oriented, answers questions appropriately. Speech normal, face symmetric and moving all 4 extremities without gross focal neurological deficit.   Respiratory:  Breathing comfortably, good air exchange, no wheezes, no crackles.   Cardiovascular:  Regular rate and rhythm, no edema.  GI:  soft, NT/ND, BS normal. Dressing clean and dry. Wound is packed. No surrounding erythema or tenderness. No purulent discharge.   Skin/Integumen:  No acute rash or sign of bleeding.          Primary Care Physician   ANGELA LOPEZ    Discharge Orders      Reason for your hospital stay    You were admitted with an infected hematoma at site of prior removal of skin cancer.   The surgical team drained and packed the wound and you were treated with antibiotics.     Follow-up and recommended labs and tests     Follow up with primary care provider, ANGELA LOPEZ, within 7 days to evaluate medication change and for hospital follow- up.  No follow up labs or test are needed.  Follow up with  Dermatology within the week.     Activity    Your activity upon discharge: activity as tolerated. Any restrictions as directed by surgery.     Wound care and dressings    Instructions to care for your wound at home: as directed by Wound Care RN and surgery.     Diet    Follow this diet upon discharge: Orders Placed This Encounter      Moderate Consistent Carb (60 g CHO per Meal) Diet       Significant Results and Procedures   Most Recent 3 CBC's:Recent Labs   Lab Test 01/19/22  1025 01/18/22  0622 01/17/22  1254   WBC 8.2 13.9* 16.0*   HGB 9.4* 9.7* 10.0*   MCV 77* 78 80   * 439 503*     Most Recent 3 BMP's:Recent Labs   Lab Test 01/20/22  0809 01/20/22  0149 01/19/22  2205 01/18/22  0838 01/18/22  0622 01/17/22 2015 01/17/22  1251 11/30/21  0718   NA  --   --   --   --  138  --  139 142   POTASSIUM  --   --   --   --  3.4  --  3.7 4.1   CHLORIDE  --   --   --   --  109  --  105 110*    CO2  --   --   --   --  23  --  27 27   BUN  --   --   --   --  13  --  18 14   CR  --   --   --   --  0.77  --  0.86 0.95   ANIONGAP  --   --   --   --  6  --  7 5   MÓNICA  --   --   --   --  8.0*  --  8.4* 8.5   * 104* 260*   < > 121*   < > 131* 171*    < > = values in this interval not displayed.   ,   Results for orders placed or performed during the hospital encounter of 01/17/22   CT Abdomen Pelvis w/o Contrast    Narrative    CT ABDOMEN/PELVIS WITHOUT CONTRAST January 17, 2022 1:53 PM    CLINICAL HISTORY: Left lower abdominal wall swelling after skin  biopsy. Leukocytosis.    TECHNIQUE: CT scan of the abdomen and pelvis was performed without IV  contrast. Multiplanar reformats were obtained. Dose reduction  techniques were used.  CONTRAST: None.    COMPARISON: None.    FINDINGS:   LOWER CHEST: Coronary artery calcification and stenting. The  visualized lung bases are clear.    HEPATOBILIARY: Unremarkable. No hepatic masses are seen.    PANCREAS: Normal.    SPLEEN: Normal.    ADRENAL GLANDS: Normal.    KIDNEYS/BLADDER: Unremarkable. No hydronephrosis.    BOWEL: No bowel obstruction. No convincing evidence for colitis or  diverticulitis. Unremarkable appendix.    PELVIC ORGANS: Unremarkable.    LYMPH NODES: No enlarged lymph nodes are identified in the abdomen or  pelvis.    VASCULATURE: Moderate atherosclerotic aortoiliac calcification.    ADDITIONAL FINDINGS: Irregular area of increased density in the  subcutaneous fat of the left lower quadrant is of higher density than  simple fluid, and is suspicious for a hematoma, measuring  approximately 5.6 x 1.8 x 1.9 cm. No associated air bubbles are  identified. There is mild overlying skin thickening, and mild  surrounding subcutaneous edema.    MUSCULOSKELETAL: Unremarkable.      Impression    IMPRESSION: Irregular area of increased density in the subcutaneous  fat of the left lower quadrant is suspicious for a subcutaneous  hematoma. No associated air  bubbles are identified, and an abscess is  considered less likely from this appearance. Clinical correlation and  follow-up are recommended to ensure resolution.    ADY CARROLL MD         SYSTEM ID:  [id number]       Discharge Medications   Discharge Medication List as of 1/20/2022 12:36 PM      START taking these medications    Details   amoxicillin-clavulanate (AUGMENTIN) 875-125 MG tablet Take 1 tablet by mouth every 12 hours for 5 days, Disp-10 tablet, R-0, E-Prescribe         CONTINUE these medications which have NOT CHANGED    Details   aspirin (ASA) 81 MG EC tablet Take 1 tablet (81 mg) by mouth daily Start tomorrow., Disp-30 tablet, R-3, No Print Out      atorvastatin (LIPITOR) 40 MG tablet Take 40 mg by mouth At Bedtime , Historical      chlorthalidone (HYGROTON) 25 MG tablet Take 12.5 mg by mouth At Bedtime , Historical      cloNIDine (CATAPRES) 0.1 MG tablet Take 0.1 mg by mouth 2 times daily, Historical      glipiZIDE (GLUCOTROL XL) 10 MG 24 hr tablet Take 10 mg by mouth 2 times daily (with meals), Historical      irbesartan (AVAPRO) 150 MG tablet Take 150 mg by mouth 2 times daily , Historical      metFORMIN (GLUCOPHAGE-XR) 500 MG 24 hr tablet Take 1,000 mg by mouth 2 times daily (with meals) Takes in the afternoon and evening, Historical      metoprolol succinate ER (TOPROL-XL) 50 MG 24 hr tablet Take 1 tablet (50 mg) by mouth 2 times daily, Historical      nitroGLYcerin (NITROSTAT) 0.3 MG sublingual tablet For chest pain place 1 tablet under the tongue every 5 minutes for 3 doses. If symptoms persist 5 minutes after 1st dose call 911., Disp-10 tablet, R-0, E-Prescribe      ticagrelor (BRILINTA) 90 MG tablet Take 1 tablet (90 mg) by mouth every 12 hours, Disp-60 tablet, R-11, E-Prescribe      Vitamin D3 (CHOLECALCIFEROL) 25 mcg (1000 units) tablet Take 1 tablet by mouth 2 times daily , Historical           Allergies   Allergies   Allergen Reactions     Janet      Prinivil [Lisinopril]

## 2022-01-21 ENCOUNTER — APPOINTMENT (OUTPATIENT)
Dept: INTERPRETER SERVICES | Facility: CLINIC | Age: 82
End: 2022-01-21
Payer: COMMERCIAL

## 2022-01-21 ENCOUNTER — PATIENT OUTREACH (OUTPATIENT)
Dept: CARE COORDINATION | Facility: CLINIC | Age: 82
End: 2022-01-21
Payer: COMMERCIAL

## 2022-01-21 DIAGNOSIS — Z71.89 OTHER SPECIFIED COUNSELING: ICD-10-CM

## 2022-01-21 NOTE — PROGRESS NOTES
Clinic Care Coordination Contact  [facility]: Post-Discharge Note  SITUATION                                                      Admission:    Admission Date: 01/17/22   Reason for Admission: Left lower abdomen wound check with recent resection  Discharge:   Discharge Date: 01/20/22  Discharge Diagnosis: Left lower abdomen wound check with recent resection    BACKGROUND                                                      Son Shy is a very pleasant 81-year-old Armenian-speaking gentleman who presents to [facility] for a wound check.  The patient is unable to speak and his granddaughter provides the interpretive services.  He has history of type 2 diabetes and NSTEMI in 10/2021 where he underwent stenting of his OM1 and then a staged intervention in November with two further stents in the LAD territory.  He has been on Brilinta and aspirin since then.  The patient had an abdominal lesion, according to the daughter, for about 5 years.  The patient's dermatologist performed a biopsy in order to be cancerous.  The patient underwent a resection of this mass 5 days ago.  The patient had self-absorbing sutures in that area; that area is quite bruised and noted over the past few days the incision became more bloody drainage with more pain and discomfort.  The patient was seen at urgent care earlier in the day where he noted to have a fever of 100.  A CBC was done, which was noted to be elevated at 19,500, hemoglobin was 10.8, platelets of 516.  He was sent to [facility], after being given Tylenol, for further assessment.    ASSESSMENT      Enrollment  Primary Care Care Coordination Status: Not a Candidate    Discharge Assessment  How are you doing now that you are home?: He is doing well  How are your symptoms? (Red Flag symptoms escalate to triage hotline per guidelines): Unchanged  Do you feel your condition is stable enough to be safe at home until your provider visit?:  Yes  Does the patient have their discharge instructions? : Yes  Does the patient have questions regarding their discharge instructions? : No  Were you started on any new medications or were there changes to any of your previous medications? : Yes  Does the patient have all of their medications?: Yes  Do you have questions regarding any of your medications? : No  Do you have all of your needed medical supplies or equipment (DME)?  (i.e. oxygen tank, CPAP, cane, etc.): Yes  Discharge follow-up appointment scheduled within 14 calendar days? : No (Patient's cosme called this morning and tried scheduling a follow up appoinment with his PCP and dermatology. She is waiting to hear back from both)  Is patient agreeable to assistance with scheduling? : No                  PLAN                                                      Outpatient Plan: Follow up with primary care provider, ANGELA LOPEZ, within 7 days to  evaluate medication change and for hospital follow- up. No follow up  labs or test are needed.  Follow up with  Dermatology within the week.    Future Appointments   Date Time Provider Department Center   3/9/2022 10:30 AM Rose Saucedo PA-C [facility]         For any urgent concerns, please contact our 24 hour nurse triage line: [phone] ([phone])       HALEY Bush  [phone]  [facility]

## 2022-01-22 LAB
BACTERIA BLD CULT: NO GROWTH
BACTERIA BLD CULT: NO GROWTH

## 2022-03-09 ENCOUNTER — OFFICE VISIT (OUTPATIENT)
Dept: CARDIOLOGY | Facility: CLINIC | Age: 82
End: 2022-03-09
Attending: PHYSICIAN ASSISTANT
Payer: COMMERCIAL

## 2022-03-09 VITALS
DIASTOLIC BLOOD PRESSURE: 76 MMHG | OXYGEN SATURATION: 98 % | BODY MASS INDEX: 30.29 KG/M2 | HEART RATE: 94 BPM | HEIGHT: 66 IN | SYSTOLIC BLOOD PRESSURE: 142 MMHG | WEIGHT: 188.5 LBS

## 2022-03-09 DIAGNOSIS — I10 BENIGN ESSENTIAL HYPERTENSION: ICD-10-CM

## 2022-03-09 DIAGNOSIS — E78.5 HYPERLIPIDEMIA LDL GOAL <70: ICD-10-CM

## 2022-03-09 DIAGNOSIS — I25.10 CORONARY ARTERY DISEASE INVOLVING NATIVE CORONARY ARTERY OF NATIVE HEART WITHOUT ANGINA PECTORIS: Primary | ICD-10-CM

## 2022-03-09 PROCEDURE — 99214 OFFICE O/P EST MOD 30 MIN: CPT | Performed by: PHYSICIAN ASSISTANT

## 2022-03-09 RX ORDER — GLIPIZIDE 10 MG/1
10 TABLET, FILM COATED, EXTENDED RELEASE ORAL 2 TIMES DAILY
COMMUNITY
Start: 2022-01-27 | End: 2022-10-06

## 2022-03-09 NOTE — PATIENT INSTRUCTIONS
Today's Plan:   1) Increase metoprolol to 2 pills (100 mg) in the morning and 2 pills (100 mg) in the evening.   2) Continue to check your blood pressure and heart rate and bring to your family doctor.     If you have questions or concerns please call my nurse team at [phone].     Scheduling phone number: [phone].  Reminder: Please bring in all current medications, over the counter supplements and vitamin bottles to your next appointment.    It was a pleasure seeing you today!     Rose Saucedo PA-C  3/9/2022

## 2022-03-09 NOTE — PROGRESS NOTES
Primary Cardiologist: Dr. Otero    Reason for Visit: 3 month follow up    History of Present Illness:   Son is a pleasant 81 year old male with past medical history notable for:      #  CAD  -- recent hx of NSTEMIwith PCI to 99% OM1 in 10/2021  -- has residual 95% mid LAD and 70% distal LAD lesions noted with plans for outpatient staged PCI. He is now s/p PCI of these remaining lesions.  -- Echo showed preserved LVEF  -- On DAPT with Brilinta.      # T2DM  -- HA1c 7.9%  -- on metformin     # HTN  -- at goal     # Mild anemia  -- felt to be likely thalassemia   -- follows with heme/onc     He is doing well. His metoprolol was increased by his PCP recently for better BP control. His BP is better since increasing metoprolol but he continues to be in the 140s for the systolic.  Overall Son tells me that he feels much better he is no longer chest discomfort or shortness of breath since his angiogram.  He denies any bleeding issues on DAPT.    Assessment and Plan:  Son is a pleasant 81 year old male with past medical history notable for:      #  CAD  -- recent hx of NSTEMIwith PCI to 99% OM1 in 10/2021  -- has residual 95% mid LAD and 70% distal LAD lesions noted with plans for outpatient staged PCI. He is now s/p PCI of these remaining lesions.  -- Echo showed preserved LVEF  -- On DAPT with Brilinta.      # T2DM  -- HA1c 7.9%  -- on metformin     # HTN  -- at goal     # Mild anemia  -- felt to be likely thalassemia   -- follows with heme/onc     He appears to be doing well from a cardiac standpoint.  He will continue with DAPT until fall/2022.  We will further increase his metoprolol to 100 mg twice daily as his blood pressure control needs to be better.  We did talk about possibly switching to carvedilol but patient just refilled his medication and therefore we will continue with metoprolol for now.  He has follow-up with his PCP in a few weeks at which time his blood pressure can be checked.  If his blood pressure  continues to be elevated we can consider switching to carvedilol 25 mg twice daily at that time.  He will otherwise follow-up in cardiology clinic in fall 2022 at which time we will consider stopping Brilinta.      This note was completed in part using Dragon voice recognition software. Although reviewed after completion, some word and grammatical errors may occur.    Orders this Visit:  No orders of the defined types were placed in this encounter.    No orders of the defined types were placed in this encounter.    There are no discontinued medications.      No diagnosis found.    CURRENT MEDICATIONS:  Current Outpatient Medications   Medication Sig Dispense Refill     aspirin (ASA) 81 MG EC tablet Take 1 tablet (81 mg) by mouth daily Start tomorrow. 30 tablet 3     atorvastatin (LIPITOR) 40 MG tablet Take 40 mg by mouth At Bedtime        chlorthalidone (HYGROTON) 25 MG tablet Take 12.5 mg by mouth At Bedtime        cloNIDine (CATAPRES) 0.1 MG tablet Take 0.1 mg by mouth 2 times daily       irbesartan (AVAPRO) 150 MG tablet Take 150 mg by mouth 2 times daily        metFORMIN (GLUCOPHAGE-XR) 500 MG 24 hr tablet Take 1,000 mg by mouth 2 times daily (with meals) Takes in the afternoon and evening       metoprolol succinate ER (TOPROL-XL) 50 MG 24 hr tablet Take 1 tablet (50 mg) by mouth 2 times daily       nitroGLYcerin (NITROSTAT) 0.3 MG sublingual tablet For chest pain place 1 tablet under the tongue every 5 minutes for 3 doses. If symptoms persist 5 minutes after 1st dose call 911. 10 tablet 0     ticagrelor (BRILINTA) 90 MG tablet Take 1 tablet (90 mg) by mouth every 12 hours 60 tablet 11     Vitamin D3 (CHOLECALCIFEROL) 25 mcg (1000 units) tablet Take 1 tablet by mouth 2 times daily          ALLERGIES     Allergies   Allergen Reactions     Janet      Prinivil [Lisinopril]        PAST MEDICAL HISTORY:  Past Medical History:   Diagnosis Date     Diabetes (H)      Hypertension      NSTEMI (non-ST elevated  myocardial infarction) (H)        PAST SURGICAL HISTORY:  Past Surgical History:   Procedure Laterality Date     CV CORONARY ANGIOGRAM N/A 10/21/2021    Procedure: Coronary Angiogram;  Surgeon: Fuad Sexton MD;  Location: [facility] CARDIAC CATH LAB     CV CORONARY ANGIOGRAM N/A 2021    Procedure: Coronary Angiogram with percutaneous intervention;  Surgeon: Fuad Sexton MD;  Location: [facility] CARDIAC CATH LAB     CV FEMORAL ANGIOGRAM N/A 10/21/2021    Procedure: Femoral Angiogram;  Surgeon: Fuad Sexton MD;  Location: [facility] CARDIAC CATH LAB     CV OPTICAL COHERENCE TOMOGRAPHY N/A 2021    Procedure: Optical Coherence Tomography;  Surgeon: Fuad Sexton MD;  Location: [facility] CARDIAC CATH LAB     CV PCI ANGIOPLASTY N/A 10/21/2021    Procedure: Percutaneous Transluminal Angioplasty;  Surgeon: Fuad Sexton MD;  Location: [facility] CARDIAC CATH LAB     CV PCI ATHERECTOMY ORBITAL N/A 2021    Procedure: Percutaneous Coronary Intervention Atherectomy Rotational;  Surgeon: Fuad Sexton MD;  Location: [facility] CARDIAC CATH LAB     CV PCI STENT DRUG ELUTING N/A 2021    Procedure: Percutaneous Coronary Intervention Stent Drug Eluting;  Surgeon: Fuad Sexton MD;  Location: [facility] CARDIAC CATH LAB       FAMILY HISTORY:  No family history on file.    SOCIAL HISTORY:  Social History     Socioeconomic History     Marital status:      Spouse name: Not on file     Number of children: Not on file     Years of education: Not on file     Highest education level: Not on file   Occupational History     Not on file   Tobacco Use     Smoking status: Former Smoker     Quit date: 5/3/1979     Years since quittin.8     Smokeless tobacco: Never Used   Substance and Sexual Activity     Alcohol use: Not Currently     Drug use: Never     Sexual activity: Not Currently   Other Topics Concern     Parent/sibling w/ CABG, MI or angioplasty before 65F 55M? Not Asked    Social History Narrative     Not on file     Social Determinants of Health     Financial Resource Strain: Not on file   Food Insecurity: Not on file   Transportation Needs: Not on file   Physical Activity: Not on file   Stress: Not on file   Social Connections: Not on file   Intimate Partner Violence: Not on file   Housing Stability: Not on file       Review of Systems:  Skin:        Eyes:       ENT:       Respiratory:       Cardiovascular:       Gastroenterology:      Genitourinary:       Musculoskeletal:       Neurologic:       Psychiatric:       Heme/Lymph/Imm:       Endocrine:         Physical Exam:  Vitals: There were no vitals taken for this visit.     GEN:  NAD  NECK: No JVD  C/V:  Regular rate and rhythm, no murmur, rub or gallop.  RESP: Clear to auscultation bilaterally without wheezing, rales, or rhonchi.  GI: Abdomen soft, nontender, nondistended.   EXTREM: No pitting LE edema.   NEURO: Alert and oriented, cooperative. No obvious focal deficits.   PSYCH: Normal affect.  SKIN: Warm and dry.       Recent Lab Results:  LIPID RESULTS:  Lab Results   Component Value Date    CHOL 90 11/30/2021    HDL 37 (L) 11/30/2021    LDL 36 11/30/2021    TRIG 86 11/30/2021       LIVER ENZYME RESULTS:  Lab Results   Component Value Date    AST 22 11/05/2021     (H) 11/27/2020    ALT 41 11/05/2021     (H) 11/27/2020       CBC RESULTS:  Lab Results   Component Value Date    WBC 8.2 01/19/2022    WBC 27.9 (H) 11/27/2020    RBC 3.92 (L) 01/19/2022    RBC 4.24 (L) 11/27/2020    HGB 9.4 (L) 01/19/2022    HGB 10.1 (L) 11/27/2020    HCT 30.2 (L) 01/19/2022    HCT 31.5 (L) 11/27/2020    MCV 77 (L) 01/19/2022    MCV 74 (L) 11/27/2020    MCH 24.0 (L) 01/19/2022    MCH 23.8 (L) 11/27/2020    MCHC 31.1 (L) 01/19/2022    MCHC 32.1 11/27/2020    RDW 16.5 (H) 01/19/2022    RDW 16.5 (H) 11/27/2020     (H) 01/19/2022     (H) 11/27/2020       BMP RESULTS:  Lab Results   Component Value Date     01/18/2022      11/27/2020    POTASSIUM 3.4 01/18/2022    POTASSIUM 3.3 (L) 11/27/2020    CHLORIDE 109 01/18/2022    CHLORIDE 96 11/27/2020    CO2 23 01/18/2022    CO2 21 11/27/2020    ANIONGAP 6 01/18/2022    ANIONGAP 17 (H) 11/27/2020     (H) 01/20/2022     (H) 01/18/2022     (H) 11/27/2020    BUN 13 01/18/2022    BUN 37 (H) 11/27/2020    CR 0.77 01/18/2022    CR 1.34 (H) 11/27/2020    GFRESTIMATED >90 01/18/2022    GFRESTIMATED 50 (L) 11/27/2020    GFRESTBLACK 58 (L) 11/27/2020    MÓNICA 8.0 (L) 01/18/2022    MÓNICA 8.3 (L) 11/27/2020        A1C RESULTS:  Lab Results   Component Value Date    A1C 7.4 (H) 01/17/2022       INR RESULTS:  Lab Results   Component Value Date    INR 0.91 05/11/2009           Rose Saucedo PA-C  March 9, 2022

## 2022-03-09 NOTE — LETTER
3/9/2022    ANGELA FARMER RUST [address]  [facility] [address]    RE: Kostas Begum       Dear Colleague,     I had the pleasure of seeing Son BETO Begum in the [facility].  Primary Cardiologist: Dr. Otero    Reason for Visit: 3 month follow up    History of Present Illness:   Son is a pleasant 81 year old male with past medical history notable for:      #  CAD  -- recent hx of NSTEMIwith PCI to 99% OM1 in 10/2021  -- has residual 95% mid LAD and 70% distal LAD lesions noted with plans for outpatient staged PCI. He is now s/p PCI of these remaining lesions.  -- Echo showed preserved LVEF  -- On DAPT with Brilinta.      # T2DM  -- HA1c 7.9%  -- on metformin     # HTN  -- at goal     # Mild anemia  -- felt to be likely thalassemia   -- follows with heme/onc     He is doing well. His metoprolol was increased by his PCP recently for better BP control. His BP is better since increasing metoprolol but he continues to be in the 140s for the systolic.  Overall Son tells me that he feels much better he is no longer chest discomfort or shortness of breath since his angiogram.  He denies any bleeding issues on DAPT.    Assessment and Plan:  Son is a pleasant 81 year old male with past medical history notable for:      #  CAD  -- recent hx of NSTEMIwith PCI to 99% OM1 in 10/2021  -- has residual 95% mid LAD and 70% distal LAD lesions noted with plans for outpatient staged PCI. He is now s/p PCI of these remaining lesions.  -- Echo showed preserved LVEF  -- On DAPT with Brilinta.      # T2DM  -- HA1c 7.9%  -- on metformin     # HTN  -- at goal     # Mild anemia  -- felt to be likely thalassemia   -- follows with heme/onc     He appears to be doing well from a cardiac standpoint.  He will continue with DAPT until fall/2022.  We will further increase his metoprolol to 100 mg twice daily as his blood pressure control needs to be better.  We did talk about possibly switching to carvedilol  but patient just refilled his medication and therefore we will continue with metoprolol for now.  He has follow-up with his PCP in a few weeks at which time his blood pressure can be checked.  If his blood pressure continues to be elevated we can consider switching to carvedilol 25 mg twice daily at that time.  He will otherwise follow-up in cardiology clinic in fall 2022 at which time we will consider stopping Brilinta.      This note was completed in part using Dragon voice recognition software. Although reviewed after completion, some word and grammatical errors may occur.    Orders this Visit:  No orders of the defined types were placed in this encounter.    No orders of the defined types were placed in this encounter.    There are no discontinued medications.      No diagnosis found.    CURRENT MEDICATIONS:  Current Outpatient Medications   Medication Sig Dispense Refill     aspirin (ASA) 81 MG EC tablet Take 1 tablet (81 mg) by mouth daily Start tomorrow. 30 tablet 3     atorvastatin (LIPITOR) 40 MG tablet Take 40 mg by mouth At Bedtime        chlorthalidone (HYGROTON) 25 MG tablet Take 12.5 mg by mouth At Bedtime        cloNIDine (CATAPRES) 0.1 MG tablet Take 0.1 mg by mouth 2 times daily       irbesartan (AVAPRO) 150 MG tablet Take 150 mg by mouth 2 times daily        metFORMIN (GLUCOPHAGE-XR) 500 MG 24 hr tablet Take 1,000 mg by mouth 2 times daily (with meals) Takes in the afternoon and evening       metoprolol succinate ER (TOPROL-XL) 50 MG 24 hr tablet Take 1 tablet (50 mg) by mouth 2 times daily       nitroGLYcerin (NITROSTAT) 0.3 MG sublingual tablet For chest pain place 1 tablet under the tongue every 5 minutes for 3 doses. If symptoms persist 5 minutes after 1st dose call 911. 10 tablet 0     ticagrelor (BRILINTA) 90 MG tablet Take 1 tablet (90 mg) by mouth every 12 hours 60 tablet 11     Vitamin D3 (CHOLECALCIFEROL) 25 mcg (1000 units) tablet Take 1 tablet by mouth 2 times daily          ALLERGIES      Allergies   Allergen Reactions     Janet      Prinivil [Lisinopril]        PAST MEDICAL HISTORY:  Past Medical History:   Diagnosis Date     Diabetes (H)      Hypertension      NSTEMI (non-ST elevated myocardial infarction) (H)        PAST SURGICAL HISTORY:  Past Surgical History:   Procedure Laterality Date     CV CORONARY ANGIOGRAM N/A 10/21/2021    Procedure: Coronary Angiogram;  Surgeon: Fuad Sexton MD;  Location: [facility] CARDIAC CATH LAB     CV CORONARY ANGIOGRAM N/A 2021    Procedure: Coronary Angiogram with percutaneous intervention;  Surgeon: Fuad Sexton MD;  Location: [facility] CARDIAC CATH LAB     CV FEMORAL ANGIOGRAM N/A 10/21/2021    Procedure: Femoral Angiogram;  Surgeon: Fuad Sexton MD;  Location: [facility] CARDIAC CATH LAB     CV OPTICAL COHERENCE TOMOGRAPHY N/A 2021    Procedure: Optical Coherence Tomography;  Surgeon: Fuad Sexton MD;  Location: [facility] CARDIAC CATH LAB     CV PCI ANGIOPLASTY N/A 10/21/2021    Procedure: Percutaneous Transluminal Angioplasty;  Surgeon: Fuad Sexton MD;  Location: [facility] CARDIAC CATH LAB     CV PCI ATHERECTOMY ORBITAL N/A 2021    Procedure: Percutaneous Coronary Intervention Atherectomy Rotational;  Surgeon: Fuad Sexton MD;  Location: [facility] CARDIAC CATH LAB     CV PCI STENT DRUG ELUTING N/A 2021    Procedure: Percutaneous Coronary Intervention Stent Drug Eluting;  Surgeon: Fuad Sexton MD;  Location: [facility] CARDIAC CATH LAB       FAMILY HISTORY:  No family history on file.    SOCIAL HISTORY:  Social History     Socioeconomic History     Marital status:      Spouse name: Not on file     Number of children: Not on file     Years of education: Not on file     Highest education level: Not on file   Occupational History     Not on file   Tobacco Use     Smoking status: Former Smoker     Quit date: 5/3/1979     Years since quittin.8     Smokeless tobacco: Never Used    Substance and Sexual Activity     Alcohol use: Not Currently     Drug use: Never     Sexual activity: Not Currently   Other Topics Concern     Parent/sibling w/ CABG, MI or angioplasty before 65F 55M? Not Asked   Social History Narrative     Not on file     Social Determinants of Health     Financial Resource Strain: Not on file   Food Insecurity: Not on file   Transportation Needs: Not on file   Physical Activity: Not on file   Stress: Not on file   Social Connections: Not on file   Intimate Partner Violence: Not on file   Housing Stability: Not on file       Review of Systems:  Skin:        Eyes:       ENT:       Respiratory:       Cardiovascular:       Gastroenterology:      Genitourinary:       Musculoskeletal:       Neurologic:       Psychiatric:       Heme/Lymph/Imm:       Endocrine:         Physical Exam:  Vitals: There were no vitals taken for this visit.     GEN:  NAD  NECK: No JVD  C/V:  Regular rate and rhythm, no murmur, rub or gallop.  RESP: Clear to auscultation bilaterally without wheezing, rales, or rhonchi.  GI: Abdomen soft, nontender, nondistended.   EXTREM: No pitting LE edema.   NEURO: Alert and oriented, cooperative. No obvious focal deficits.   PSYCH: Normal affect.  SKIN: Warm and dry.       Recent Lab Results:  LIPID RESULTS:  Lab Results   Component Value Date    CHOL 90 11/30/2021    HDL 37 (L) 11/30/2021    LDL 36 11/30/2021    TRIG 86 11/30/2021       LIVER ENZYME RESULTS:  Lab Results   Component Value Date    AST 22 11/05/2021     (H) 11/27/2020    ALT 41 11/05/2021     (H) 11/27/2020       CBC RESULTS:  Lab Results   Component Value Date    WBC 8.2 01/19/2022    WBC 27.9 (H) 11/27/2020    RBC 3.92 (L) 01/19/2022    RBC 4.24 (L) 11/27/2020    HGB 9.4 (L) 01/19/2022    HGB 10.1 (L) 11/27/2020    HCT 30.2 (L) 01/19/2022    HCT 31.5 (L) 11/27/2020    MCV 77 (L) 01/19/2022    MCV 74 (L) 11/27/2020    MCH 24.0 (L) 01/19/2022    MCH 23.8 (L) 11/27/2020    MCHC 31.1 (L)  01/19/2022    MCHC 32.1 11/27/2020    RDW 16.5 (H) 01/19/2022    RDW 16.5 (H) 11/27/2020     (H) 01/19/2022     (H) 11/27/2020       BMP RESULTS:  Lab Results   Component Value Date     01/18/2022     11/27/2020    POTASSIUM 3.4 01/18/2022    POTASSIUM 3.3 (L) 11/27/2020    CHLORIDE 109 01/18/2022    CHLORIDE 96 11/27/2020    CO2 23 01/18/2022    CO2 21 11/27/2020    ANIONGAP 6 01/18/2022    ANIONGAP 17 (H) 11/27/2020     (H) 01/20/2022     (H) 01/18/2022     (H) 11/27/2020    BUN 13 01/18/2022    BUN 37 (H) 11/27/2020    CR 0.77 01/18/2022    CR 1.34 (H) 11/27/2020    GFRESTIMATED >90 01/18/2022    GFRESTIMATED 50 (L) 11/27/2020    GFRESTBLACK 58 (L) 11/27/2020    MÓNICA 8.0 (L) 01/18/2022    MÓNICA 8.3 (L) 11/27/2020        A1C RESULTS:  Lab Results   Component Value Date    A1C 7.4 (H) 01/17/2022       INR RESULTS:  Lab Results   Component Value Date    INR 0.91 05/11/2009           Rose Saucedo PA-C  March 9, 2022     Thank you for allowing me to participate in the care of your patient.      Sincerely,     Rose Saucedo PA-C     [facility]  cc:   Rose Saucedo PA-C  [address] [id number]

## 2022-03-23 NOTE — Clinical Note
Chronic stable  Needs monitoring  Discussed no nsaids and hydration   Guide across the lesion to circumflex.

## 2022-10-06 ENCOUNTER — OFFICE VISIT (OUTPATIENT)
Dept: CARDIOLOGY | Facility: CLINIC | Age: 82
End: 2022-10-06
Attending: PHYSICIAN ASSISTANT
Payer: COMMERCIAL

## 2022-10-06 VITALS
DIASTOLIC BLOOD PRESSURE: 91 MMHG | BODY MASS INDEX: 29.7 KG/M2 | SYSTOLIC BLOOD PRESSURE: 158 MMHG | WEIGHT: 184 LBS | OXYGEN SATURATION: 98 % | HEART RATE: 91 BPM

## 2022-10-06 DIAGNOSIS — E78.5 HYPERLIPIDEMIA LDL GOAL <70: ICD-10-CM

## 2022-10-06 DIAGNOSIS — I25.10 CORONARY ARTERY DISEASE INVOLVING NATIVE CORONARY ARTERY OF NATIVE HEART WITHOUT ANGINA PECTORIS: ICD-10-CM

## 2022-10-06 DIAGNOSIS — I10 BENIGN ESSENTIAL HYPERTENSION: ICD-10-CM

## 2022-10-06 PROCEDURE — 99214 OFFICE O/P EST MOD 30 MIN: CPT | Performed by: PHYSICIAN ASSISTANT

## 2022-10-06 RX ORDER — CARVEDILOL 25 MG/1
25 TABLET ORAL 2 TIMES DAILY WITH MEALS
COMMUNITY

## 2022-10-06 RX ORDER — DULAGLUTIDE 1.5 MG/.5ML
1.5 INJECTION, SOLUTION SUBCUTANEOUS
COMMUNITY
Start: 2022-10-06

## 2022-10-06 NOTE — PROGRESS NOTES
Primary Cardiologist: Dr. Otero    Reason for Visit: HTN management; discuss stopping Brilinta     History of Present Illness:   Son is a pleasant 81 year old male with past medical history notable for:      #  CAD  -- Hx of NSTEMI in 11/2021 with PCI to 99% OM1 in 10/2021  -- s/p rotational atherectomy of mid-distal LAD and OCT-guided PCI of mid and distal LAD in a staged fashion  -- Echo showed preserved LVEF  -- On DAPT with Brilinta     # T2DM  -- under better control; transitioned to trucility     # HTN  -- at goal at home   -- white coat hypertension hx      # Mild anemia  -- felt to be likely thalassemia   -- follows with heme/onc    Son is doing well. His BP is high here but at home he checks his BP every other day and his average BP is 120/70. I suspect his high blood pressure is secondary to white coat hypertension. He denies recurrent CP or SOB. He is still on DAPT and will complete it in November.     Assessment and Plan:  Son is a pleasant Danish-speaking male with past medical history notable for CAD, HTN (partly white coat hypertension), hyperlipidemia, T2DM, and mild anemia 2/2 thalassemia.    Son appears to be doing well from a cardiac standpoint. He will continue with Brilinta for one more month. After that he will continue with aspirin 81 mg alone. His BP is high here but at home it is under better control. We will not make any further adjustments to his antihypertensives. He will return to our clinic in 1 year with repeat BMP and Lipid/ALT.       This note was completed in part using Dragon voice recognition software. Although reviewed after completion, some word and grammatical errors may occur.    Orders this Visit:  No orders of the defined types were placed in this encounter.    Orders Placed This Encounter   Medications     carvedilol (COREG) 25 MG tablet     Sig: Take 25 mg by mouth 2 times daily (with meals)     dulaglutide (TRULICITY) 1.5 MG/0.5ML pen     Sig: Inject 1.5 mg  Subcutaneous every 7 days     Medications Discontinued During This Encounter   Medication Reason     metoprolol succinate ER (TOPROL-XL) 50 MG 24 hr tablet      glipiZIDE (GLUCOTROL XL) 10 MG 24 hr tablet          Encounter Diagnoses   Name Primary?     Coronary artery disease involving native coronary artery of native heart without angina pectoris      Hyperlipidemia LDL goal <70      Benign essential hypertension        CURRENT MEDICATIONS:  Current Outpatient Medications   Medication Sig Dispense Refill     aspirin (ASA) 81 MG EC tablet Take 1 tablet (81 mg) by mouth daily Start tomorrow. 30 tablet 3     atorvastatin (LIPITOR) 40 MG tablet Take 40 mg by mouth At Bedtime        carvedilol (COREG) 25 MG tablet Take 25 mg by mouth 2 times daily (with meals)       chlorthalidone (HYGROTON) 25 MG tablet Take 12.5 mg by mouth At Bedtime        cloNIDine (CATAPRES) 0.1 MG tablet Take 0.1 mg by mouth 2 times daily       dulaglutide (TRULICITY) 1.5 MG/0.5ML pen Inject 1.5 mg Subcutaneous every 7 days       irbesartan (AVAPRO) 150 MG tablet Take 150 mg by mouth daily       metFORMIN (GLUCOPHAGE-XR) 500 MG 24 hr tablet Take 1,000 mg by mouth 2 times daily (with meals) Takes in the afternoon and evening       nitroGLYcerin (NITROSTAT) 0.3 MG sublingual tablet For chest pain place 1 tablet under the tongue every 5 minutes for 3 doses. If symptoms persist 5 minutes after 1st dose call 911. 10 tablet 0     ticagrelor (BRILINTA) 90 MG tablet Take 1 tablet (90 mg) by mouth every 12 hours 60 tablet 11     Vitamin D3 (CHOLECALCIFEROL) 25 mcg (1000 units) tablet Take 1 tablet by mouth 2 times daily          ALLERGIES     Allergies   Allergen Reactions     Janet      Prinivil [Lisinopril]        PAST MEDICAL HISTORY:  Past Medical History:   Diagnosis Date     Diabetes (H)      Hypertension      NSTEMI (non-ST elevated myocardial infarction) (H)        PAST SURGICAL HISTORY:  Past Surgical History:   Procedure Laterality Date      CV CORONARY ANGIOGRAM N/A 10/21/2021    Procedure: Coronary Angiogram;  Surgeon: Fuad Sexton MD;  Location: [facility] CARDIAC CATH LAB     CV CORONARY ANGIOGRAM N/A 2021    Procedure: Coronary Angiogram with percutaneous intervention;  Surgeon: Fuad Sexton MD;  Location: [facility] CARDIAC CATH LAB     CV FEMORAL ANGIOGRAM N/A 10/21/2021    Procedure: Femoral Angiogram;  Surgeon: Fuad Sexton MD;  Location: [facility] CARDIAC CATH LAB     CV OPTICAL COHERENCE TOMOGRAPHY N/A 2021    Procedure: Optical Coherence Tomography;  Surgeon: Fuad Sexton MD;  Location: [facility] CARDIAC CATH LAB     CV PCI ANGIOPLASTY N/A 10/21/2021    Procedure: Percutaneous Transluminal Angioplasty;  Surgeon: Fuad Sexton MD;  Location: [facility] CARDIAC CATH LAB     CV PCI ATHERECTOMY ORBITAL N/A 2021    Procedure: Percutaneous Coronary Intervention Atherectomy Rotational;  Surgeon: Fuad Sexton MD;  Location: [facility] CARDIAC CATH LAB     CV PCI STENT DRUG ELUTING N/A 2021    Procedure: Percutaneous Coronary Intervention Stent Drug Eluting;  Surgeon: Fuad Sexton MD;  Location: [facility] CARDIAC CATH LAB       FAMILY HISTORY:  History reviewed. No pertinent family history.    SOCIAL HISTORY:  Social History     Socioeconomic History     Marital status:      Spouse name: None     Number of children: None     Years of education: None     Highest education level: None   Tobacco Use     Smoking status: Former Smoker     Quit date: 5/3/1979     Years since quittin.4     Smokeless tobacco: Never Used   Substance and Sexual Activity     Alcohol use: Not Currently     Drug use: Never     Sexual activity: Not Currently       Review of Systems:  Skin:  Negative     Eyes:  Positive for glasses  ENT:  Positive for hearing loss  Respiratory:  Negative    Cardiovascular:  Negative    Gastroenterology: Positive for heartburn  Genitourinary:  Negative    Musculoskeletal:   Negative    Neurologic:  Negative    Psychiatric:  Negative    Heme/Lymph/Imm:  Negative    Endocrine:  Positive for diabetes    Physical Exam:  Vitals: BP (!) 158/91   Pulse 91   Wt 83.5 kg (184 lb)   SpO2 98%   BMI 29.70 kg/m       GEN:  NAD. Welsh  present on speaker phone.  NECK: No JVD  C/V:  Regular rate and rhythm, no murmur, rub or gallop.  RESP: Clear to auscultation bilaterally without wheezing, rales, or rhonchi.  GI: Abdomen soft, nontender, nondistended.   EXTREM: No pitting LE edema.   NEURO: Alert and oriented, cooperative. No obvious focal deficits.   PSYCH: Normal affect.  SKIN: Warm and dry.       Recent Lab Results:  LIPID RESULTS:  Lab Results   Component Value Date    CHOL 90 11/30/2021    HDL 37 (L) 11/30/2021    LDL 36 11/30/2021    TRIG 86 11/30/2021       LIVER ENZYME RESULTS:  Lab Results   Component Value Date    AST 22 11/05/2021     (H) 11/27/2020    ALT 41 11/05/2021     (H) 11/27/2020       CBC RESULTS:  Lab Results   Component Value Date    WBC 8.2 01/19/2022    WBC 27.9 (H) 11/27/2020    RBC 3.92 (L) 01/19/2022    RBC 4.24 (L) 11/27/2020    HGB 9.4 (L) 01/19/2022    HGB 10.1 (L) 11/27/2020    HCT 30.2 (L) 01/19/2022    HCT 31.5 (L) 11/27/2020    MCV 77 (L) 01/19/2022    MCV 74 (L) 11/27/2020    MCH 24.0 (L) 01/19/2022    MCH 23.8 (L) 11/27/2020    MCHC 31.1 (L) 01/19/2022    MCHC 32.1 11/27/2020    RDW 16.5 (H) 01/19/2022    RDW 16.5 (H) 11/27/2020     (H) 01/19/2022     (H) 11/27/2020       BMP RESULTS:  Lab Results   Component Value Date     01/18/2022     11/27/2020    POTASSIUM 3.4 01/18/2022    POTASSIUM 3.3 (L) 11/27/2020    CHLORIDE 109 01/18/2022    CHLORIDE 96 11/27/2020    CO2 23 01/18/2022    CO2 21 11/27/2020    ANIONGAP 6 01/18/2022    ANIONGAP 17 (H) 11/27/2020     (H) 01/20/2022     (H) 01/18/2022     (H) 11/27/2020    BUN 13 01/18/2022    BUN 37 (H) 11/27/2020    CR 0.77 01/18/2022    CR  1.34 (H) 11/27/2020    GFRESTIMATED >90 01/18/2022    GFRESTIMATED 50 (L) 11/27/2020    GFRESTBLACK 58 (L) 11/27/2020    MÓNICA 8.0 (L) 01/18/2022    MÓNICA 8.3 (L) 11/27/2020        A1C RESULTS:  Lab Results   Component Value Date    A1C 7.4 (H) 01/17/2022       INR RESULTS:  Lab Results   Component Value Date    INR 0.91 05/11/2009           Rose Saucedo PA-C  October 6, 2022

## 2022-10-06 NOTE — LETTER
10/6/2022    ANGELA FARMER [facility] [address]  [facility] [address]    RE: Kostas Begum       Dear Colleague,     I had the pleasure of seeing Son BETO Begum in the [facility].  Primary Cardiologist: Dr. Otero    Reason for Visit: HTN management; discuss stopping Brilinta     History of Present Illness:   Son is a pleasant 81 year old male with past medical history notable for:      #  CAD  -- Hx of NSTEMI in 11/2021 with PCI to 99% OM1 in 10/2021  -- s/p rotational atherectomy of mid-distal LAD and OCT-guided PCI of mid and distal LAD in a staged fashion  -- Echo showed preserved LVEF  -- On DAPT with Brilinta     # T2DM  -- under better control; transitioned to trucility     # HTN  -- at goal at home   -- white coat hypertension hx      # Mild anemia  -- felt to be likely thalassemia   -- follows with heme/onc    Son is doing well. His BP is high here but at home he checks his BP every other day and his average BP is 120/70. I suspect his high blood pressure is secondary to white coat hypertension. He denies recurrent CP or SOB. He is still on DAPT and will complete it in November.     Assessment and Plan:  Son is a pleasant Serbian-speaking male with past medical history notable for CAD, HTN (partly white coat hypertension), hyperlipidemia, T2DM, and mild anemia 2/2 thalassemia.    Son appears to be doing well from a cardiac standpoint. He will continue with Brilinta for one more month. After that he will continue with aspirin 81 mg alone. His BP is high here but at home it is under better control. We will not make any further adjustments to his antihypertensives. He will return to our clinic in 1 year with repeat BMP and Lipid/ALT.       This note was completed in part using Dragon voice recognition software. Although reviewed after completion, some word and grammatical errors may occur.    Orders this Visit:  No orders of the defined types were placed in this  encounter.    Orders Placed This Encounter   Medications     carvedilol (COREG) 25 MG tablet     Sig: Take 25 mg by mouth 2 times daily (with meals)     dulaglutide (TRULICITY) 1.5 MG/0.5ML pen     Sig: Inject 1.5 mg Subcutaneous every 7 days     Medications Discontinued During This Encounter   Medication Reason     metoprolol succinate ER (TOPROL-XL) 50 MG 24 hr tablet      glipiZIDE (GLUCOTROL XL) 10 MG 24 hr tablet          Encounter Diagnoses   Name Primary?     Coronary artery disease involving native coronary artery of native heart without angina pectoris      Hyperlipidemia LDL goal <70      Benign essential hypertension        CURRENT MEDICATIONS:  Current Outpatient Medications   Medication Sig Dispense Refill     aspirin (ASA) 81 MG EC tablet Take 1 tablet (81 mg) by mouth daily Start tomorrow. 30 tablet 3     atorvastatin (LIPITOR) 40 MG tablet Take 40 mg by mouth At Bedtime        carvedilol (COREG) 25 MG tablet Take 25 mg by mouth 2 times daily (with meals)       chlorthalidone (HYGROTON) 25 MG tablet Take 12.5 mg by mouth At Bedtime        cloNIDine (CATAPRES) 0.1 MG tablet Take 0.1 mg by mouth 2 times daily       dulaglutide (TRULICITY) 1.5 MG/0.5ML pen Inject 1.5 mg Subcutaneous every 7 days       irbesartan (AVAPRO) 150 MG tablet Take 150 mg by mouth daily       metFORMIN (GLUCOPHAGE-XR) 500 MG 24 hr tablet Take 1,000 mg by mouth 2 times daily (with meals) Takes in the afternoon and evening       nitroGLYcerin (NITROSTAT) 0.3 MG sublingual tablet For chest pain place 1 tablet under the tongue every 5 minutes for 3 doses. If symptoms persist 5 minutes after 1st dose call 911. 10 tablet 0     ticagrelor (BRILINTA) 90 MG tablet Take 1 tablet (90 mg) by mouth every 12 hours 60 tablet 11     Vitamin D3 (CHOLECALCIFEROL) 25 mcg (1000 units) tablet Take 1 tablet by mouth 2 times daily          ALLERGIES     Allergies   Allergen Reactions     Janet      Prinivil [Lisinopril]        PAST MEDICAL  HISTORY:  Past Medical History:   Diagnosis Date     Diabetes (H)      Hypertension      NSTEMI (non-ST elevated myocardial infarction) (H)        PAST SURGICAL HISTORY:  Past Surgical History:   Procedure Laterality Date     CV CORONARY ANGIOGRAM N/A 10/21/2021    Procedure: Coronary Angiogram;  Surgeon: Fuad Sexton MD;  Location: [facility] CARDIAC CATH LAB     CV CORONARY ANGIOGRAM N/A 2021    Procedure: Coronary Angiogram with percutaneous intervention;  Surgeon: Fuad Sexton MD;  Location: [facility] CARDIAC CATH LAB     CV FEMORAL ANGIOGRAM N/A 10/21/2021    Procedure: Femoral Angiogram;  Surgeon: Fuad Sexton MD;  Location: [facility] CARDIAC CATH LAB     CV OPTICAL COHERENCE TOMOGRAPHY N/A 2021    Procedure: Optical Coherence Tomography;  Surgeon: Fuad Sexton MD;  Location: [facility] CARDIAC CATH LAB     CV PCI ANGIOPLASTY N/A 10/21/2021    Procedure: Percutaneous Transluminal Angioplasty;  Surgeon: Fuad Sexton MD;  Location: [facility] CARDIAC CATH LAB     CV PCI ATHERECTOMY ORBITAL N/A 2021    Procedure: Percutaneous Coronary Intervention Atherectomy Rotational;  Surgeon: Fuad Sexton MD;  Location: [facility] CARDIAC CATH LAB     CV PCI STENT DRUG ELUTING N/A 2021    Procedure: Percutaneous Coronary Intervention Stent Drug Eluting;  Surgeon: Fuad Sexton MD;  Location: [facility] CARDIAC CATH LAB       FAMILY HISTORY:  History reviewed. No pertinent family history.    SOCIAL HISTORY:  Social History     Socioeconomic History     Marital status:      Spouse name: None     Number of children: None     Years of education: None     Highest education level: None   Tobacco Use     Smoking status: Former Smoker     Quit date: 5/3/1979     Years since quittin.4     Smokeless tobacco: Never Used   Substance and Sexual Activity     Alcohol use: Not Currently     Drug use: Never     Sexual activity: Not Currently       Review of Systems:  Skin:   Negative     Eyes:  Positive for glasses  ENT:  Positive for hearing loss  Respiratory:  Negative    Cardiovascular:  Negative    Gastroenterology: Positive for heartburn  Genitourinary:  Negative    Musculoskeletal:  Negative    Neurologic:  Negative    Psychiatric:  Negative    Heme/Lymph/Imm:  Negative    Endocrine:  Positive for diabetes    Physical Exam:  Vitals: BP (!) 158/91   Pulse 91   Wt 83.5 kg (184 lb)   SpO2 98%   BMI 29.70 kg/m       GEN:  NAD. Macedonian  present on speaker phone.  NECK: No JVD  C/V:  Regular rate and rhythm, no murmur, rub or gallop.  RESP: Clear to auscultation bilaterally without wheezing, rales, or rhonchi.  GI: Abdomen soft, nontender, nondistended.   EXTREM: No pitting LE edema.   NEURO: Alert and oriented, cooperative. No obvious focal deficits.   PSYCH: Normal affect.  SKIN: Warm and dry.       Recent Lab Results:  LIPID RESULTS:  Lab Results   Component Value Date    CHOL 90 11/30/2021    HDL 37 (L) 11/30/2021    LDL 36 11/30/2021    TRIG 86 11/30/2021       LIVER ENZYME RESULTS:  Lab Results   Component Value Date    AST 22 11/05/2021     (H) 11/27/2020    ALT 41 11/05/2021     (H) 11/27/2020       CBC RESULTS:  Lab Results   Component Value Date    WBC 8.2 01/19/2022    WBC 27.9 (H) 11/27/2020    RBC 3.92 (L) 01/19/2022    RBC 4.24 (L) 11/27/2020    HGB 9.4 (L) 01/19/2022    HGB 10.1 (L) 11/27/2020    HCT 30.2 (L) 01/19/2022    HCT 31.5 (L) 11/27/2020    MCV 77 (L) 01/19/2022    MCV 74 (L) 11/27/2020    MCH 24.0 (L) 01/19/2022    MCH 23.8 (L) 11/27/2020    MCHC 31.1 (L) 01/19/2022    MCHC 32.1 11/27/2020    RDW 16.5 (H) 01/19/2022    RDW 16.5 (H) 11/27/2020     (H) 01/19/2022     (H) 11/27/2020       BMP RESULTS:  Lab Results   Component Value Date     01/18/2022     11/27/2020    POTASSIUM 3.4 01/18/2022    POTASSIUM 3.3 (L) 11/27/2020    CHLORIDE 109 01/18/2022    CHLORIDE 96 11/27/2020    CO2 23 01/18/2022    CO2 21  11/27/2020    ANIONGAP 6 01/18/2022    ANIONGAP 17 (H) 11/27/2020     (H) 01/20/2022     (H) 01/18/2022     (H) 11/27/2020    BUN 13 01/18/2022    BUN 37 (H) 11/27/2020    CR 0.77 01/18/2022    CR 1.34 (H) 11/27/2020    GFRESTIMATED >90 01/18/2022    GFRESTIMATED 50 (L) 11/27/2020    GFRESTBLACK 58 (L) 11/27/2020    MÓNICA 8.0 (L) 01/18/2022    MÓNICA 8.3 (L) 11/27/2020        A1C RESULTS:  Lab Results   Component Value Date    A1C 7.4 (H) 01/17/2022       INR RESULTS:  Lab Results   Component Value Date    INR 0.91 05/11/2009     Rose Saucedo PA-C  October 6, 2022     Thank you for allowing me to participate in the care of your patient.      Sincerely,     Rose Saucedo PA-C     [facility]    cc:   Rose Saucedo PA-C  [address]  [place] [address]

## 2023-10-06 ENCOUNTER — LAB (OUTPATIENT)
Dept: LAB | Facility: CLINIC | Age: 83
End: 2023-10-06
Payer: COMMERCIAL

## 2023-10-06 DIAGNOSIS — E78.5 HYPERLIPIDEMIA LDL GOAL <70: ICD-10-CM

## 2023-10-06 DIAGNOSIS — I25.10 CORONARY ARTERY DISEASE INVOLVING NATIVE CORONARY ARTERY OF NATIVE HEART WITHOUT ANGINA PECTORIS: ICD-10-CM

## 2023-10-06 DIAGNOSIS — I10 BENIGN ESSENTIAL HYPERTENSION: ICD-10-CM

## 2023-10-06 LAB
ALT SERPL W P-5'-P-CCNC: 24 U/L (ref 0–70)
ANION GAP SERPL CALCULATED.3IONS-SCNC: 10 MMOL/L (ref 7–15)
BUN SERPL-MCNC: 21.9 MG/DL (ref 8–23)
CALCIUM SERPL-MCNC: 7.8 MG/DL (ref 8.8–10.2)
CHLORIDE SERPL-SCNC: 107 MMOL/L (ref 98–107)
CHOLEST SERPL-MCNC: 80 MG/DL
CREAT SERPL-MCNC: 0.83 MG/DL (ref 0.67–1.17)
DEPRECATED HCO3 PLAS-SCNC: 23 MMOL/L (ref 22–29)
EGFRCR SERPLBLD CKD-EPI 2021: 88 ML/MIN/1.73M2
GLUCOSE SERPL-MCNC: 130 MG/DL (ref 70–99)
HDLC SERPL-MCNC: 28 MG/DL
LDLC SERPL CALC-MCNC: 41 MG/DL
NONHDLC SERPL-MCNC: 52 MG/DL
POTASSIUM SERPL-SCNC: 3.7 MMOL/L (ref 3.4–5.3)
SODIUM SERPL-SCNC: 140 MMOL/L (ref 135–145)
TRIGL SERPL-MCNC: 53 MG/DL

## 2023-10-06 PROCEDURE — 36415 COLL VENOUS BLD VENIPUNCTURE: CPT | Performed by: PHYSICIAN ASSISTANT

## 2023-10-06 PROCEDURE — 80048 BASIC METABOLIC PNL TOTAL CA: CPT | Performed by: PHYSICIAN ASSISTANT

## 2023-10-06 PROCEDURE — 80061 LIPID PANEL: CPT | Performed by: PHYSICIAN ASSISTANT

## 2023-10-06 PROCEDURE — 84460 ALANINE AMINO (ALT) (SGPT): CPT | Performed by: PHYSICIAN ASSISTANT

## 2023-12-06 ENCOUNTER — OFFICE VISIT (OUTPATIENT)
Dept: CARDIOLOGY | Facility: CLINIC | Age: 83
End: 2023-12-06
Attending: PHYSICIAN ASSISTANT
Payer: COMMERCIAL

## 2023-12-06 VITALS
HEART RATE: 100 BPM | WEIGHT: 177 LBS | HEIGHT: 67 IN | BODY MASS INDEX: 27.78 KG/M2 | SYSTOLIC BLOOD PRESSURE: 153 MMHG | OXYGEN SATURATION: 96 % | DIASTOLIC BLOOD PRESSURE: 63 MMHG

## 2023-12-06 DIAGNOSIS — E78.5 HYPERLIPIDEMIA LDL GOAL <70: ICD-10-CM

## 2023-12-06 DIAGNOSIS — R94.31 ABNORMAL ELECTROCARDIOGRAM: ICD-10-CM

## 2023-12-06 DIAGNOSIS — I44.0 FIRST DEGREE ATRIOVENTRICULAR BLOCK: Primary | ICD-10-CM

## 2023-12-06 DIAGNOSIS — I10 BENIGN ESSENTIAL HYPERTENSION: ICD-10-CM

## 2023-12-06 DIAGNOSIS — I25.10 CORONARY ARTERY DISEASE INVOLVING NATIVE CORONARY ARTERY OF NATIVE HEART WITHOUT ANGINA PECTORIS: ICD-10-CM

## 2023-12-06 PROCEDURE — 99214 OFFICE O/P EST MOD 30 MIN: CPT | Performed by: INTERNAL MEDICINE

## 2023-12-06 NOTE — PROGRESS NOTES
HPI and Plan:     Kostas Begum is a pleasant Yi 82 y/o male , NSTEMI, s/p PCI to OM1, HTN, HLD, DM, here for annual follow up.     LCX TYRONE in 2021.  No chest pain since then.      BP from home 110-120's.  LDL 41, HDL 28.  Currently on Brilinta, avapro, clonidine, coreg, and lipitor.40 mg daily.      In summer walking 2-2.5 miles per day.  Not active in the winter.      Compliant with medications.  No issues.       Last echocardiogram reviewed 2021 and EKG 1st degree AVB with long QT.       Recommend continue medical mangement and stress test.  Annual follow up.      Today's clinic visit entailed:  Review of the result(s) of each unique test - echo, EKG  The following tests were independently interpreted by me as noted in my documentation: echocardiogram, EKG  Ordering of each unique test  30 minutes spent by me on the date of the encounter doing chart review, history and exam, documentation and further activities per the note  Provider  Link to [facility] Help Grid     The level of medical decision making during this visit was of moderate complexity.      No orders of the defined types were placed in this encounter.    No orders of the defined types were placed in this encounter.    There are no discontinued medications.      Encounter Diagnoses   Name Primary?    Coronary artery disease involving native coronary artery of native heart without angina pectoris     Hyperlipidemia LDL goal <70     Benign essential hypertension        CURRENT MEDICATIONS:  Current Outpatient Medications   Medication Sig Dispense Refill    aspirin (ASA) 81 MG EC tablet Take 1 tablet (81 mg) by mouth daily Start tomorrow. 30 tablet 3    atorvastatin (LIPITOR) 40 MG tablet Take 40 mg by mouth At Bedtime       carvedilol (COREG) 25 MG tablet Take 25 mg by mouth 2 times daily (with meals)      chlorthalidone (HYGROTON) 25 MG tablet Take 12.5 mg by mouth At Bedtime       cloNIDine (CATAPRES) 0.1 MG tablet Take 0.1 mg by mouth 2 times daily       dulaglutide (TRULICITY) 1.5 MG/0.5ML pen Inject 1.5 mg Subcutaneous every 7 days      irbesartan (AVAPRO) 150 MG tablet Take 150 mg by mouth daily      metFORMIN (GLUCOPHAGE-XR) 500 MG 24 hr tablet Take 1,000 mg by mouth 2 times daily (with meals) Takes in the afternoon and evening      ticagrelor (BRILINTA) 90 MG tablet Take 1 tablet (90 mg) by mouth every 12 hours 60 tablet 11    Vitamin D3 (CHOLECALCIFEROL) 25 mcg (1000 units) tablet Take 1 tablet by mouth 2 times daily       nitroGLYcerin (NITROSTAT) 0.3 MG sublingual tablet For chest pain place 1 tablet under the tongue every 5 minutes for 3 doses. If symptoms persist 5 minutes after 1st dose call 911. (Patient not taking: Reported on 12/6/2023) 10 tablet 0       ALLERGIES     Allergies   Allergen Reactions    Aspirin Effervescent     Prinivil [Lisinopril]        PAST MEDICAL HISTORY:  Past Medical History:   Diagnosis Date    Diabetes (H)     Hypertension     NSTEMI (non-ST elevated myocardial infarction) (H)        PAST SURGICAL HISTORY:  Past Surgical History:   Procedure Laterality Date    CV CORONARY ANGIOGRAM N/A 10/21/2021    Procedure: Coronary Angiogram;  Surgeon: Fuad Sexton MD;  Location: [facility] CARDIAC CATH LAB    CV CORONARY ANGIOGRAM N/A 11/30/2021    Procedure: Coronary Angiogram with percutaneous intervention;  Surgeon: Fuad Sexton MD;  Location: [facility] CARDIAC CATH LAB    CV FEMORAL ANGIOGRAM N/A 10/21/2021    Procedure: Femoral Angiogram;  Surgeon: Fuad Sexton MD;  Location: [facility] CARDIAC CATH LAB    CV OPTICAL COHERENCE TOMOGRAPHY N/A 11/30/2021    Procedure: Optical Coherence Tomography;  Surgeon: Fuad Sexton MD;  Location: [facility] CARDIAC CATH LAB    CV PCI ANGIOPLASTY N/A 10/21/2021    Procedure: Percutaneous Transluminal Angioplasty;  Surgeon: Fuad Sexton MD;  Location: [facility] CARDIAC CATH LAB    CV PCI ATHERECTOMY ORBITAL N/A 11/30/2021    Procedure: Percutaneous Coronary Intervention  "Atherectomy Rotational;  Surgeon: Fuad Sexton MD;  Location: [facility] CARDIAC CATH LAB    CV PCI STENT DRUG ELUTING N/A 2021    Procedure: Percutaneous Coronary Intervention Stent Drug Eluting;  Surgeon: Fuad Sexton MD;  Location: [facility] CARDIAC CATH LAB       FAMILY HISTORY:  History reviewed. No pertinent family history.    SOCIAL HISTORY:  Social History     Socioeconomic History    Marital status:      Spouse name: None    Number of children: None    Years of education: None    Highest education level: None   Tobacco Use    Smoking status: Former     Types: Cigarettes     Quit date: 5/3/1979     Years since quittin.6    Smokeless tobacco: Never   Substance and Sexual Activity    Alcohol use: Not Currently    Drug use: Never    Sexual activity: Not Currently       Review of Systems:  Skin:        Eyes:       ENT:       Respiratory:  Positive for cough  Cardiovascular:  Negative for;palpitations;chest pain;lightheadedness;dizziness;syncope or near-syncope;edema;fatigue    Gastroenterology:      Genitourinary:       Musculoskeletal:       Neurologic:       Psychiatric:       Heme/Lymph/Imm:       Endocrine:         Physical Exam:  Vitals: BP (!) 153/63   Pulse 100   Ht 1.702 m (5' 7\")   Wt 80.3 kg (177 lb)   SpO2 96%   BMI 27.72 kg/m      Constitutional:           Skin:             Head:           Eyes:           Lymph:      ENT:           Neck:           Respiratory:            Cardiac:                                                           GI:           Extremities and Muscular Skeletal:                 Neurological:           Psych:         Recent Lab Results:  LIPID RESULTS:  Lab Results   Component Value Date    CHOL 80 10/06/2023    HDL 28 (L) 10/06/2023    LDL 41 10/06/2023    TRIG 53 10/06/2023       LIVER ENZYME RESULTS:  Lab Results   Component Value Date    AST 22 2021     (H) 2020    ALT 24 10/06/2023     (H) 2020       CBC " RESULTS:  Lab Results   Component Value Date    WBC 8.2 01/19/2022    WBC 27.9 (H) 11/27/2020    RBC 3.92 (L) 01/19/2022    RBC 4.24 (L) 11/27/2020    HGB 9.4 (L) 01/19/2022    HGB 10.1 (L) 11/27/2020    HCT 30.2 (L) 01/19/2022    HCT 31.5 (L) 11/27/2020    MCV 77 (L) 01/19/2022    MCV 74 (L) 11/27/2020    MCH 24.0 (L) 01/19/2022    MCH 23.8 (L) 11/27/2020    MCHC 31.1 (L) 01/19/2022    MCHC 32.1 11/27/2020    RDW 16.5 (H) 01/19/2022    RDW 16.5 (H) 11/27/2020     (H) 01/19/2022     (H) 11/27/2020       BMP RESULTS:  Lab Results   Component Value Date     10/06/2023     11/27/2020    POTASSIUM 3.7 10/06/2023    POTASSIUM 3.4 01/18/2022    POTASSIUM 3.3 (L) 11/27/2020    CHLORIDE 107 10/06/2023    CHLORIDE 109 01/18/2022    CHLORIDE 96 11/27/2020    CO2 23 10/06/2023    CO2 23 01/18/2022    CO2 21 11/27/2020    ANIONGAP 10 10/06/2023    ANIONGAP 6 01/18/2022    ANIONGAP 17 (H) 11/27/2020     (H) 10/06/2023     (H) 01/20/2022     (H) 01/18/2022     (H) 11/27/2020    BUN 21.9 10/06/2023    BUN 13 01/18/2022    BUN 37 (H) 11/27/2020    CR 0.83 10/06/2023    CR 1.34 (H) 11/27/2020    GFRESTIMATED 88 10/06/2023    GFRESTIMATED 50 (L) 11/27/2020    GFRESTBLACK 58 (L) 11/27/2020    MÓNICA 7.8 (L) 10/06/2023    MÓNICA 8.3 (L) 11/27/2020        A1C RESULTS:  Lab Results   Component Value Date    A1C 7.4 (H) 01/17/2022       INR RESULTS:  Lab Results   Component Value Date    INR 0.91 05/11/2009           VIVIANA Saucedo PA-C  [address] [place] [address]

## 2023-12-06 NOTE — LETTER
12/6/2023    Bhumika Thomas  [address]  [facility] [address]    RE: Kostas Begum       Dear Colleague,     I had the pleasure of seeing Kostas Begum in the [facility].  HPI and Plan:     Kostas Begum is a pleasant Maltese 82 y/o male , NSTEMI, s/p PCI to OM1, HTN, HLD, DM, here for annual follow up.     LCX TYRONE in 2021.  No chest pain since then.      BP from home 110-120's.  LDL 41, HDL 28.  Currently on Brilinta, avapro, clonidine, coreg, and lipitor.40 mg daily.      In summer walking 2-2.5 miles per day.  Not active in the winter.      Compliant with medications.  No issues.       Last echocardiogram reviewed 2021 and EKG 1st degree AVB with long QT.       Recommend continue medical mangement and stress test.  Annual follow up.      Today's clinic visit entailed:  Review of the result(s) of each unique test - echo, EKG  The following tests were independently interpreted by me as noted in my documentation: echocardiogram, EKG  Ordering of each unique test  30 minutes spent by me on the date of the encounter doing chart review, history and exam, documentation and further activities per the note  Provider  Link to MDM Help Grid     The level of medical decision making during this visit was of moderate complexity.      No orders of the defined types were placed in this encounter.    No orders of the defined types were placed in this encounter.    There are no discontinued medications.      Encounter Diagnoses   Name Primary?    Coronary artery disease involving native coronary artery of native heart without angina pectoris     Hyperlipidemia LDL goal <70     Benign essential hypertension        CURRENT MEDICATIONS:  Current Outpatient Medications   Medication Sig Dispense Refill    aspirin (ASA) 81 MG EC tablet Take 1 tablet (81 mg) by mouth daily Start tomorrow. 30 tablet 3    atorvastatin (LIPITOR) 40 MG tablet Take 40 mg by mouth At Bedtime       carvedilol (COREG) 25 MG tablet  Take 25 mg by mouth 2 times daily (with meals)      chlorthalidone (HYGROTON) 25 MG tablet Take 12.5 mg by mouth At Bedtime       cloNIDine (CATAPRES) 0.1 MG tablet Take 0.1 mg by mouth 2 times daily      dulaglutide (TRULICITY) 1.5 MG/0.5ML pen Inject 1.5 mg Subcutaneous every 7 days      irbesartan (AVAPRO) 150 MG tablet Take 150 mg by mouth daily      metFORMIN (GLUCOPHAGE-XR) 500 MG 24 hr tablet Take 1,000 mg by mouth 2 times daily (with meals) Takes in the afternoon and evening      ticagrelor (BRILINTA) 90 MG tablet Take 1 tablet (90 mg) by mouth every 12 hours 60 tablet 11    Vitamin D3 (CHOLECALCIFEROL) 25 mcg (1000 units) tablet Take 1 tablet by mouth 2 times daily       nitroGLYcerin (NITROSTAT) 0.3 MG sublingual tablet For chest pain place 1 tablet under the tongue every 5 minutes for 3 doses. If symptoms persist 5 minutes after 1st dose call 911. (Patient not taking: Reported on 12/6/2023) 10 tablet 0       ALLERGIES     Allergies   Allergen Reactions    Aspirin Effervescent     Prinivil [Lisinopril]        PAST MEDICAL HISTORY:  Past Medical History:   Diagnosis Date    Diabetes (H)     Hypertension     NSTEMI (non-ST elevated myocardial infarction) (H)        PAST SURGICAL HISTORY:  Past Surgical History:   Procedure Laterality Date    CV CORONARY ANGIOGRAM N/A 10/21/2021    Procedure: Coronary Angiogram;  Surgeon: Fuad Sexton MD;  Location: [facility] CARDIAC CATH LAB    CV CORONARY ANGIOGRAM N/A 11/30/2021    Procedure: Coronary Angiogram with percutaneous intervention;  Surgeon: Fuad Sexton MD;  Location: [facility] CARDIAC CATH LAB    CV FEMORAL ANGIOGRAM N/A 10/21/2021    Procedure: Femoral Angiogram;  Surgeon: Fuad Sexton MD;  Location: [facility] CARDIAC CATH LAB    CV OPTICAL COHERENCE TOMOGRAPHY N/A 11/30/2021    Procedure: Optical Coherence Tomography;  Surgeon: Fuad Sexton MD;  Location: [facility] CARDIAC CATH LAB    CV PCI ANGIOPLASTY N/A 10/21/2021    Procedure:  "Percutaneous Transluminal Angioplasty;  Surgeon: Fuad Sexton MD;  Location:  HEART CARDIAC CATH LAB    CV PCI ATHERECTOMY ORBITAL N/A 2021    Procedure: Percutaneous Coronary Intervention Atherectomy Rotational;  Surgeon: Fuad Sexton MD;  Location:  HEART CARDIAC CATH LAB    CV PCI STENT DRUG ELUTING N/A 2021    Procedure: Percutaneous Coronary Intervention Stent Drug Eluting;  Surgeon: Fuad Sexton MD;  Location:  HEART CARDIAC CATH LAB       FAMILY HISTORY:  History reviewed. No pertinent family history.    SOCIAL HISTORY:  Social History     Socioeconomic History    Marital status:      Spouse name: None    Number of children: None    Years of education: None    Highest education level: None   Tobacco Use    Smoking status: Former     Types: Cigarettes     Quit date: 5/3/1979     Years since quittin.6    Smokeless tobacco: Never   Substance and Sexual Activity    Alcohol use: Not Currently    Drug use: Never    Sexual activity: Not Currently       Review of Systems:  Skin:        Eyes:       ENT:       Respiratory:  Positive for cough  Cardiovascular:  Negative for;palpitations;chest pain;lightheadedness;dizziness;syncope or near-syncope;edema;fatigue    Gastroenterology:      Genitourinary:       Musculoskeletal:       Neurologic:       Psychiatric:       Heme/Lymph/Imm:       Endocrine:         Physical Exam:  Vitals: BP (!) 153/63   Pulse 100   Ht 1.702 m (5' 7\")   Wt 80.3 kg (177 lb)   SpO2 96%   BMI 27.72 kg/m      Constitutional:           Skin:             Head:           Eyes:           Lymph:      ENT:           Neck:           Respiratory:            Cardiac:                                                           GI:           Extremities and Muscular Skeletal:                 Neurological:           Psych:         Recent Lab Results:  LIPID RESULTS:  Lab Results   Component Value Date    CHOL 80 10/06/2023    HDL 28 (L) 10/06/2023    LDL 41 " 10/06/2023    TRIG 53 10/06/2023       LIVER ENZYME RESULTS:  Lab Results   Component Value Date    AST 22 11/05/2021     (H) 11/27/2020    ALT 24 10/06/2023     (H) 11/27/2020       CBC RESULTS:  Lab Results   Component Value Date    WBC 8.2 01/19/2022    WBC 27.9 (H) 11/27/2020    RBC 3.92 (L) 01/19/2022    RBC 4.24 (L) 11/27/2020    HGB 9.4 (L) 01/19/2022    HGB 10.1 (L) 11/27/2020    HCT 30.2 (L) 01/19/2022    HCT 31.5 (L) 11/27/2020    MCV 77 (L) 01/19/2022    MCV 74 (L) 11/27/2020    MCH 24.0 (L) 01/19/2022    MCH 23.8 (L) 11/27/2020    MCHC 31.1 (L) 01/19/2022    MCHC 32.1 11/27/2020    RDW 16.5 (H) 01/19/2022    RDW 16.5 (H) 11/27/2020     (H) 01/19/2022     (H) 11/27/2020       BMP RESULTS:  Lab Results   Component Value Date     10/06/2023     11/27/2020    POTASSIUM 3.7 10/06/2023    POTASSIUM 3.4 01/18/2022    POTASSIUM 3.3 (L) 11/27/2020    CHLORIDE 107 10/06/2023    CHLORIDE 109 01/18/2022    CHLORIDE 96 11/27/2020    CO2 23 10/06/2023    CO2 23 01/18/2022    CO2 21 11/27/2020    ANIONGAP 10 10/06/2023    ANIONGAP 6 01/18/2022    ANIONGAP 17 (H) 11/27/2020     (H) 10/06/2023     (H) 01/20/2022     (H) 01/18/2022     (H) 11/27/2020    BUN 21.9 10/06/2023    BUN 13 01/18/2022    BUN 37 (H) 11/27/2020    CR 0.83 10/06/2023    CR 1.34 (H) 11/27/2020    GFRESTIMATED 88 10/06/2023    GFRESTIMATED 50 (L) 11/27/2020    GFRESTBLACK 58 (L) 11/27/2020    MÓNICA 7.8 (L) 10/06/2023    MÓNICA 8.3 (L) 11/27/2020        A1C RESULTS:  Lab Results   Component Value Date    A1C 7.4 (H) 01/17/2022       INR RESULTS:  Lab Results   Component Value Date    INR 0.91 05/11/2009           CC  Rose Saucedo PA-C  [address] [place] [address]      Thank you for allowing me to participate in the care of your patient.      Sincerely,     JOSE M SINGH MD     [facility]  cc:   Rose Saucedo,  ELDA  [address] [place] [address]

## 2023-12-13 ENCOUNTER — TELEPHONE (OUTPATIENT)
Dept: CARDIOLOGY | Facility: CLINIC | Age: 83
End: 2023-12-13

## 2023-12-13 ENCOUNTER — HOSPITAL ENCOUNTER (OUTPATIENT)
Dept: CARDIOLOGY | Facility: CLINIC | Age: 83
Discharge: HOME | End: 2023-12-13
Attending: INTERNAL MEDICINE | Admitting: INTERNAL MEDICINE
Payer: COMMERCIAL

## 2023-12-13 DIAGNOSIS — I10 BENIGN ESSENTIAL HYPERTENSION: ICD-10-CM

## 2023-12-13 DIAGNOSIS — I25.10 CORONARY ARTERY DISEASE INVOLVING NATIVE CORONARY ARTERY OF NATIVE HEART WITHOUT ANGINA PECTORIS: ICD-10-CM

## 2023-12-13 DIAGNOSIS — I44.0 FIRST DEGREE ATRIOVENTRICULAR BLOCK: ICD-10-CM

## 2023-12-13 DIAGNOSIS — E78.5 HYPERLIPIDEMIA LDL GOAL <70: ICD-10-CM

## 2023-12-13 PROCEDURE — 93321 DOPPLER ECHO F-UP/LMTD STD: CPT | Mod: 26 | Performed by: INTERNAL MEDICINE

## 2023-12-13 PROCEDURE — 93350 STRESS TTE ONLY: CPT | Mod: TC

## 2023-12-13 PROCEDURE — 93018 CV STRESS TEST I&R ONLY: CPT | Performed by: INTERNAL MEDICINE

## 2023-12-13 PROCEDURE — 93325 DOPPLER ECHO COLOR FLOW MAPG: CPT | Mod: TC

## 2023-12-13 PROCEDURE — 93016 CV STRESS TEST SUPVJ ONLY: CPT | Performed by: INTERNAL MEDICINE

## 2023-12-13 PROCEDURE — 93325 DOPPLER ECHO COLOR FLOW MAPG: CPT | Mod: 26 | Performed by: INTERNAL MEDICINE

## 2023-12-13 PROCEDURE — 93350 STRESS TTE ONLY: CPT | Mod: 26 | Performed by: INTERNAL MEDICINE

## 2023-12-13 NOTE — TELEPHONE ENCOUNTER
Stress test results noted:    Interpretation Summary  1. Average exercise capacity, 101% max HR achieved. The patient did not  exhibit any symptoms during exercise.  2. This was a normal stress EKG with no evidence of stress-induced ischemia.  3. Rest echo: Normal left ventricular function and wall motion at rest. The  visual ejection fraction is estimated at 55-60%.  4. Stress echo: This was a normal stress echocardiogram with no evidence of  stress-induced ischemia. The visual ejection fraction is 65-70%.     No previous stress for comparison.    Contacted patient to review normal results utilizing [employer] . Patient did not answer. Left detailed message utilizing [employer] , and instructed patient to call back with any further questions.

## 2024-07-09 NOTE — PROGRESS NOTES
[facility]    Hospitalist Progress Note    Date of Service (when I saw the patient): 01/18/2022  Admit date: 1/17/2022    Assessment & Plan   Son Shy is 81, who had a skin mass excision of his left lower quadrant of the abdomen, who is on dual antiplatelet therapy for stents that were placed in 10/2021 and 11/2021, admitted with a 3-4 day history of increasing bruising, pain and swelling, and imaging study that appears to be more hematoma than an abscess, being admitted for further treatment.    Abdominal CT: abdominal wall hematoma with overlying skin thickening and surrounding subcutaneous edema.      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  Infected hematoma at surgical site  On DAPT for CAD  - Continue on Unasyn for now. BCx NGTD. No other cultures sent.   - General Surgery consultation appreciated. S/p I&D with packing at bedside on 01/18/22.     CAD, S/p PCI 11/2021 and 10/2021 and is on dual antiplatelet therapy.    HTN  - Hematoma stable. Continue Brilinta and ASA given recent stents, surgery in agreement.   - Continue Avapro, clonidine, chlorthalidone and metoprolol, statin      DMT2 A1c 7.4 on 1/17  - Holding PTA metformin.    - Continue PTA glipizide  - Follow on sliding scale insulin   Recent Labs   Lab 01/18/22  1342 01/18/22  0838 01/18/22  0622 01/18/22  0612 01/18/22  0214 01/17/22 2015   * 137* 121* 122* 168* 158*        Diet: Orders Placed This Encounter      Moderate Consistent Carb (60 g CHO per Meal) Diet     IVF: None  [place] Catheter: Not present     DVT Prophylaxis: On DAPT, ambulating.   Code Status: Full Code     Disposition: Expected discharge in 1 to 2 days. Once surgery feels it is adequately drained and WBC is improved, no fevers.  Communication: Discussed with patient on 01/18/22    Kinza Sood  Hospitalist Service  [facility]  Securely message with the Vocera Web Console (learn more  Rx Refill Note  Requested Prescriptions     Pending Prescriptions Disp Refills    rOPINIRole (REQUIP) 0.25 MG tablet [Pharmacy Med Name: ropinirole 0.25 mg tablet] 60 tablet 5     Sig: TAKE 2 TABLETS BY MOUTH EVERY NIGHT 1 HOUR BEFORE BEDTIME      Last filled:  11/02/2023 w/5  Last office visit with prescribing clinician: 04/09/2024 w/[facility]      Next office visit with prescribing clinician: 8/7/2024     Renu Jay MA  07/09/24, 13:50 EDT   here)  Text page via [facility] Paging/Directory    Interval History   Events over last 24 hours as outlined above.   Patient states it feels better, less hard. He tells me that [place] almost [place], moved here from [place]. [place] the [place] and [place] weather [place].   Denies any SOB, CP, N/V/D. Abdominal pain minimal.   He doesn't like the food here though. He hopes his wife to bring him Greek food.     -Data reviewed today: I reviewed all new labs and imaging results over the last 24 hours. I personally reviewed no images or EKG's today.    Physical Exam   Temp: 98.2  F (36.8  C) Temp src: Oral BP: 128/75 Pulse: 90   Resp: 18 SpO2: 100 % O2 Device: None (Room air)    Vitals:    01/18/22 0622   Weight: 78.9 kg (173 lb 15.1 oz)     Vital Signs with Ranges  Temp:  [98.2  F (36.8  C)-98.4  F (36.9  C)] 98.2  F (36.8  C)  Pulse:  [86-90] 90  Resp:  [17-18] 18  BP: (110-136)/(68-75) 128/75  SpO2:  [95 %-100 %] 100 %  No intake/output data recorded.    Today's Exam  Constitutional:  NAD,   Neuropsyche: Talkative, jovial, alert and oriented, answers questions appropriately.   Respiratory:  Breathing comfortably, good air exchange, no wheezes, no crackles.   Cardiovascular:  Regular rate and rhythm, no edema.  GI:  soft, NT/ND, BS normal  Skin/Integumen:  No acute rash or sign of bleeding.     Medications   All medications reviewed on 01/18/22      sodium chloride 125 mL/hr at 01/18/22 1142       ampicillin-sulbactam (UNASYN) IV  3 g Intravenous Q6H     aspirin  81 mg Oral Daily     atorvastatin  40 mg Oral At Bedtime     chlorthalidone  12.5 mg Oral At Bedtime     cloNIDine  0.1 mg Oral BID     glipiZIDE  10 mg Oral BID w/meals     insulin aspart  1-7 Units Subcutaneous TID AC     insulin aspart  1-5 Units Subcutaneous At Bedtime     irbesartan  150 mg Oral BID     metoprolol succinate ER  50 mg Oral BID     senna-docusate  1 tablet Oral BID    Or     senna-docusate  2  tablet Oral BID     sodium chloride (PF)  3 mL Intracatheter Q8H     ticagrelor  90 mg Oral Q12H     Vitamin D3  25 mcg Oral BID       Data   Recent Labs   Lab 01/18/22  1342 01/18/22  0838 01/18/22  0622 01/17/22 2015 01/17/22  1254 01/17/22  1251   WBC  --   --  13.9*  --  16.0*  --    HGB  --   --  9.7*  --  10.0*  --    MCV  --   --  78  --  80  --    PLT  --   --  439  --  503*  --    NA  --   --  138  --   --  139   POTASSIUM  --   --  3.4  --   --  3.7   CHLORIDE  --   --  109  --   --  105   CO2  --   --  23  --   --  27   BUN  --   --  13  --   --  18   CR  --   --  0.77  --   --  0.86   ANIONGAP  --   --  6  --   --  7   MÓNICA  --   --  8.0*  --   --  8.4*   * 137* 121*   < >  --  131*    < > = values in this interval not displayed.       No results found for this or any previous visit (from the past 24 hour(s)).

## 2025-08-27 ENCOUNTER — HOSPITAL ENCOUNTER (OUTPATIENT)
Facility: CLINIC | Age: 85
Setting detail: OBSERVATION
End: 2025-08-27
Attending: EMERGENCY MEDICINE | Admitting: STUDENT IN AN ORGANIZED HEALTH CARE EDUCATION/TRAINING PROGRAM
Payer: COMMERCIAL

## 2025-08-27 ENCOUNTER — APPOINTMENT (OUTPATIENT)
Dept: MRI IMAGING | Facility: CLINIC | Age: 85
End: 2025-08-27
Attending: EMERGENCY MEDICINE
Payer: COMMERCIAL

## 2025-08-27 ENCOUNTER — OFFICE VISIT (OUTPATIENT)
Dept: URGENT CARE | Facility: URGENT CARE | Age: 85
End: 2025-08-27
Payer: COMMERCIAL

## 2025-08-27 VITALS
SYSTOLIC BLOOD PRESSURE: 147 MMHG | BODY MASS INDEX: 27.57 KG/M2 | OXYGEN SATURATION: 97 % | RESPIRATION RATE: 22 BRPM | WEIGHT: 176 LBS | DIASTOLIC BLOOD PRESSURE: 82 MMHG | HEART RATE: 89 BPM | TEMPERATURE: 97.3 F

## 2025-08-27 DIAGNOSIS — R42 EPISODE OF DIZZINESS: Primary | ICD-10-CM

## 2025-08-27 DIAGNOSIS — Z86.79 HISTORY OF CARDIOVASCULAR CALCIFICATIONS: ICD-10-CM

## 2025-08-27 DIAGNOSIS — E11.9 TYPE 2 DIABETES MELLITUS WITHOUT COMPLICATION, WITHOUT LONG-TERM CURRENT USE OF INSULIN (H): ICD-10-CM

## 2025-08-27 PROBLEM — A60.00 GENITAL HERPES: Status: ACTIVE | Noted: 2023-02-14

## 2025-08-27 PROBLEM — J12.82 PNEUMONIA DUE TO COVID-19 VIRUS: Status: ACTIVE | Noted: 2022-01-04

## 2025-08-27 PROBLEM — K64.8 INTERNAL HEMORRHOID: Status: ACTIVE | Noted: 2023-02-14

## 2025-08-27 PROBLEM — I10 WHITE COAT SYNDROME WITH DIAGNOSIS OF HYPERTENSION: Status: ACTIVE | Noted: 2023-02-14

## 2025-08-27 PROBLEM — Z98.890 STATUS POST CORONARY ANGIOGRAM: Status: ACTIVE | Noted: 2021-11-30

## 2025-08-27 PROBLEM — I25.10 CORONARY ARTERY DISEASE INVOLVING NATIVE CORONARY ARTERY OF NATIVE HEART WITHOUT ANGINA PECTORIS: Status: ACTIVE | Noted: 2025-08-27

## 2025-08-27 PROBLEM — U07.1 PNEUMONIA DUE TO COVID-19 VIRUS: Status: ACTIVE | Noted: 2022-01-04

## 2025-08-27 PROBLEM — K21.9 GERD (GASTROESOPHAGEAL REFLUX DISEASE): Status: ACTIVE | Noted: 2022-01-04

## 2025-08-27 PROBLEM — H90.3 SENSORINEURAL HEARING LOSS (SNHL) OF BOTH EARS: Status: ACTIVE | Noted: 2019-08-13

## 2025-08-27 PROBLEM — I25.2 STATUS POST NON-ST ELEVATION MYOCARDIAL INFARCTION (NSTEMI): Status: ACTIVE | Noted: 2021-10-27

## 2025-08-27 PROBLEM — G45.9 TIA (TRANSIENT ISCHEMIC ATTACK): Status: ACTIVE | Noted: 2025-08-27

## 2025-08-27 PROCEDURE — 70549 MR ANGIOGRAPH NECK W/O&W/DYE: CPT

## 2025-08-27 PROCEDURE — 70553 MRI BRAIN STEM W/O & W/DYE: CPT

## 2025-08-27 PROCEDURE — 70544 MR ANGIOGRAPHY HEAD W/O DYE: CPT

## 2025-08-27 ASSESSMENT — ACTIVITIES OF DAILY LIVING (ADL)
ADLS_ACUITY_SCORE: 53

## 2025-08-28 ENCOUNTER — ORDERS ONLY (AUTO-RELEASED) (OUTPATIENT)
Dept: MEDSURG UNIT | Facility: CLINIC | Age: 85
End: 2025-08-28

## 2025-08-28 ENCOUNTER — APPOINTMENT (OUTPATIENT)
Dept: PHYSICAL THERAPY | Facility: CLINIC | Age: 85
End: 2025-08-28
Attending: STUDENT IN AN ORGANIZED HEALTH CARE EDUCATION/TRAINING PROGRAM
Payer: COMMERCIAL

## 2025-08-28 DIAGNOSIS — G45.9 TIA (TRANSIENT ISCHEMIC ATTACK): ICD-10-CM

## 2025-08-28 ASSESSMENT — ACTIVITIES OF DAILY LIVING (ADL)
ADLS_ACUITY_SCORE: 53
ADLS_ACUITY_SCORE: 36
ADLS_ACUITY_SCORE: 56
ADLS_ACUITY_SCORE: 53
ADLS_ACUITY_SCORE: 56
ADLS_ACUITY_SCORE: 33
ADLS_ACUITY_SCORE: 56
ADLS_ACUITY_SCORE: 56
ADLS_ACUITY_SCORE: 36
DEPENDENT_IADLS:: INDEPENDENT
ADLS_ACUITY_SCORE: 56
ADLS_ACUITY_SCORE: 33
ADLS_ACUITY_SCORE: 53
ADLS_ACUITY_SCORE: 32
ADLS_ACUITY_SCORE: 53
ADLS_ACUITY_SCORE: 53
ADLS_ACUITY_SCORE: 56
ADLS_ACUITY_SCORE: 56
ADLS_ACUITY_SCORE: 53
ADLS_ACUITY_SCORE: 53
ADLS_ACUITY_SCORE: 32
ADLS_ACUITY_SCORE: 55

## 2025-08-29 ENCOUNTER — APPOINTMENT (OUTPATIENT)
Dept: CARDIOLOGY | Facility: CLINIC | Age: 85
End: 2025-08-29
Attending: STUDENT IN AN ORGANIZED HEALTH CARE EDUCATION/TRAINING PROGRAM
Payer: COMMERCIAL

## 2025-08-29 PROBLEM — I65.1 BASILAR ARTERY STENOSIS: Status: ACTIVE | Noted: 2025-08-29

## 2025-08-29 PROCEDURE — 93306 TTE W/DOPPLER COMPLETE: CPT | Mod: 26 | Performed by: INTERNAL MEDICINE

## 2025-08-29 PROCEDURE — 93306 TTE W/DOPPLER COMPLETE: CPT

## 2025-08-29 ASSESSMENT — ACTIVITIES OF DAILY LIVING (ADL)
ADLS_ACUITY_SCORE: 36

## 2025-09-01 ENCOUNTER — PATIENT OUTREACH (OUTPATIENT)
Dept: CARE COORDINATION | Facility: CLINIC | Age: 85
End: 2025-09-01
Payer: COMMERCIAL

## 2025-09-02 ENCOUNTER — PATIENT OUTREACH (OUTPATIENT)
Dept: CARE COORDINATION | Facility: CLINIC | Age: 85
End: 2025-09-02
Payer: COMMERCIAL

## 2025-09-03 ENCOUNTER — PATIENT OUTREACH (OUTPATIENT)
Dept: CARE COORDINATION | Facility: CLINIC | Age: 85
End: 2025-09-03
Payer: COMMERCIAL

## (undated) DEVICE — CATH BALLOON CUTTING WOLVERINE 3.25X10MH74939401103250

## (undated) DEVICE — DEFIB PRO-PADZ LVP LQD GEL ADULT 8900-2105-01

## (undated) DEVICE — LUB INST 20ML RTGLD 6/PK H7492354800162

## (undated) DEVICE — MANIFOLD KIT ANGIO AUTOMATED 014613

## (undated) DEVICE — CATH BALLOON EUPHORA RX 3.0X15MM EUP3015X

## (undated) DEVICE — TOTE ANGIO CORP PC15AT SAN32CC83O

## (undated) DEVICE — GUIDEWIRE ROTAWIRE .014 325CM 228240022

## (undated) DEVICE — CATHETER DIAGNOSTIC DRAGONFLY OPSTAR IMAGING STERILE 1014651

## (undated) DEVICE — CATH BALLOON NC EMERGE 2.25X20MM H7493926720220

## (undated) DEVICE — KIT HAND CONTROL ANGIOTOUCH ACIST 65CM AT-P65

## (undated) DEVICE — INTRODUCER SHEATH GREEN 6.5FRX11CM .038IN PSI-6F-11-038ACT

## (undated) DEVICE — CATH BALLOON NC EMERGE 3.50X12MM H7493926712350

## (undated) DEVICE — GUIDEWIRE VASC 0.014INX190CM J TIP CGRXT190HJ

## (undated) DEVICE — CATH DIAG 4FR AR 1 MOD 538441

## (undated) DEVICE — CATH DIAG 4FR JL 4.5 538417

## (undated) DEVICE — CATH GUIDING  6F MACH 1 GUIDING CLS3.5

## (undated) DEVICE — CATH BALLOON EMERGE 2.0X15MM H7493918915200

## (undated) DEVICE — SYR ANGIOGRAPHY MULTIUSE KIT ACIST 014612

## (undated) DEVICE — INTRO SHEATH 4FRX10CM PINNACLE RSS402

## (undated) DEVICE — SMART CAPNOLINE H PLUS, ADULT/INTERMEDIATE O2, LONG

## (undated) DEVICE — CLOSURE ANGIOSEAL 6FR 610130

## (undated) DEVICE — CATH LAUNCHER 6FR LA6EBU375

## (undated) DEVICE — 1.5MM X 135CM ROTAPRO 2.0 EXCHANGEABLE BURR CATHETER ADVANCING DEVICE, ATHERECTOMY

## (undated) DEVICE — INFL DVC KIT W/10CC NITRO IN4530

## (undated) DEVICE — CATH BALLOON NC EMERGE 3.00X15MM H7493926715300

## (undated) DEVICE — NDL PERC ENTRY THINWALL 18GA 7.0" G00166

## (undated) DEVICE — CATH ANGIO INFINITI 3DRC 4FRX100CM 538476

## (undated) DEVICE — CATH ANGIO INFINITI MPA2 4FRX100CM 2 SH 538442

## (undated) DEVICE — CATH LAUNCHER 6FR JL 4.0 LA6JL40

## (undated) DEVICE — NDL PERC ENTRY THINWALL 18GA 9.0" G00273

## (undated) RX ORDER — VERAPAMIL HYDROCHLORIDE 2.5 MG/ML
INJECTION, SOLUTION INTRAVENOUS
Status: DISPENSED
Start: 2021-11-30

## (undated) RX ORDER — FENTANYL CITRATE 50 UG/ML
INJECTION, SOLUTION INTRAMUSCULAR; INTRAVENOUS
Status: DISPENSED
Start: 2021-10-21

## (undated) RX ORDER — HEPARIN SODIUM 1000 [USP'U]/ML
INJECTION, SOLUTION INTRAVENOUS; SUBCUTANEOUS
Status: DISPENSED
Start: 2021-10-21

## (undated) RX ORDER — NITROGLYCERIN 5 MG/ML
VIAL (ML) INTRAVENOUS
Status: DISPENSED
Start: 2021-10-21

## (undated) RX ORDER — LIDOCAINE HYDROCHLORIDE 10 MG/ML
INJECTION, SOLUTION EPIDURAL; INFILTRATION; INTRACAUDAL; PERINEURAL
Status: DISPENSED
Start: 2021-10-21

## (undated) RX ORDER — NITROGLYCERIN 5 MG/ML
VIAL (ML) INTRAVENOUS
Status: DISPENSED
Start: 2021-11-30

## (undated) RX ORDER — HEPARIN SODIUM 1000 [USP'U]/ML
INJECTION, SOLUTION INTRAVENOUS; SUBCUTANEOUS
Status: DISPENSED
Start: 2021-11-30

## (undated) RX ORDER — HEPARIN SODIUM 200 [USP'U]/100ML
INJECTION, SOLUTION INTRAVENOUS
Status: DISPENSED
Start: 2021-11-30

## (undated) RX ORDER — FENTANYL CITRATE 50 UG/ML
INJECTION, SOLUTION INTRAMUSCULAR; INTRAVENOUS
Status: DISPENSED
Start: 2021-11-30

## (undated) RX ORDER — ASPIRIN 81 MG/1
TABLET, CHEWABLE ORAL
Status: DISPENSED
Start: 2021-11-30

## (undated) RX ORDER — HEPARIN SODIUM 200 [USP'U]/100ML
INJECTION, SOLUTION INTRAVENOUS
Status: DISPENSED
Start: 2021-10-21

## (undated) RX ORDER — LIDOCAINE HYDROCHLORIDE 10 MG/ML
INJECTION, SOLUTION EPIDURAL; INFILTRATION; INTRACAUDAL; PERINEURAL
Status: DISPENSED
Start: 2021-11-30

## (undated) RX ORDER — ACETAMINOPHEN 325 MG/1
TABLET ORAL
Status: DISPENSED
Start: 2021-11-30